# Patient Record
Sex: FEMALE | Race: WHITE | Employment: OTHER | URBAN - METROPOLITAN AREA
[De-identification: names, ages, dates, MRNs, and addresses within clinical notes are randomized per-mention and may not be internally consistent; named-entity substitution may affect disease eponyms.]

---

## 2017-01-02 ENCOUNTER — OFFICE VISIT (OUTPATIENT)
Dept: FAMILY MEDICINE | Facility: CLINIC | Age: 72
End: 2017-01-02
Payer: MEDICARE

## 2017-01-02 VITALS
WEIGHT: 144 LBS | TEMPERATURE: 97.9 F | HEART RATE: 60 BPM | SYSTOLIC BLOOD PRESSURE: 131 MMHG | HEIGHT: 62 IN | DIASTOLIC BLOOD PRESSURE: 69 MMHG | BODY MASS INDEX: 26.5 KG/M2 | OXYGEN SATURATION: 96 %

## 2017-01-02 DIAGNOSIS — Z12.11 SCREEN FOR COLON CANCER: ICD-10-CM

## 2017-01-02 DIAGNOSIS — Z13.29 SCREENING FOR THYROID DISORDER: ICD-10-CM

## 2017-01-02 DIAGNOSIS — E78.5 HYPERLIPIDEMIA, UNSPECIFIED HYPERLIPIDEMIA TYPE: ICD-10-CM

## 2017-01-02 DIAGNOSIS — J45.20 INTERMITTENT ASTHMA, UNCOMPLICATED: ICD-10-CM

## 2017-01-02 DIAGNOSIS — I10 ESSENTIAL HYPERTENSION: ICD-10-CM

## 2017-01-02 DIAGNOSIS — K21.9 GASTROESOPHAGEAL REFLUX DISEASE WITHOUT ESOPHAGITIS: Primary | Chronic | ICD-10-CM

## 2017-01-02 PROCEDURE — 99214 OFFICE O/P EST MOD 30 MIN: CPT | Performed by: INTERNAL MEDICINE

## 2017-01-02 RX ORDER — CALCIUM CARBONATE 500 MG/1
1 TABLET, CHEWABLE ORAL DAILY
COMMUNITY
End: 2019-07-29

## 2017-01-02 ASSESSMENT — ANXIETY QUESTIONNAIRES
5. BEING SO RESTLESS THAT IT IS HARD TO SIT STILL: SEVERAL DAYS
7. FEELING AFRAID AS IF SOMETHING AWFUL MIGHT HAPPEN: SEVERAL DAYS
6. BECOMING EASILY ANNOYED OR IRRITABLE: NOT AT ALL
GAD7 TOTAL SCORE: 4
3. WORRYING TOO MUCH ABOUT DIFFERENT THINGS: NOT AT ALL
1. FEELING NERVOUS, ANXIOUS, OR ON EDGE: SEVERAL DAYS
IF YOU CHECKED OFF ANY PROBLEMS ON THIS QUESTIONNAIRE, HOW DIFFICULT HAVE THESE PROBLEMS MADE IT FOR YOU TO DO YOUR WORK, TAKE CARE OF THINGS AT HOME, OR GET ALONG WITH OTHER PEOPLE: NOT DIFFICULT AT ALL
2. NOT BEING ABLE TO STOP OR CONTROL WORRYING: NOT AT ALL

## 2017-01-02 ASSESSMENT — PATIENT HEALTH QUESTIONNAIRE - PHQ9: 5. POOR APPETITE OR OVEREATING: SEVERAL DAYS

## 2017-01-02 NOTE — Clinical Note
My Asthma Action Plan  Name: Carmen Bonner   YOB: 1945  Date: 1/2/2017   My doctor: Maribell Castaneda   My clinic: [address] [id number] [phone] My Control Medicine: Singulair        Dose:  My Rescue Medicine: Albuterol        Dose:    My Asthma Severity: intermittent  Avoid your asthma triggers: exercise or sports        GREEN ZONE   Good Control    I feel good    No cough or wheeze    Can work, sleep and play without asthma symptoms       Take your asthma control medicine every day.     1. If exercise triggers your asthma, take your rescue medication    15 minutes before exercise or sports, and    During exercise if you have asthma symptoms  2. Spacer to use with inhaler: If you have a spacer, make sure to use it with your inhaler             YELLOW ZONE Getting Worse  I have ANY of these:    I do not feel good    Cough or wheeze    Chest feels tight    Wake up at night   1. Keep taking your Green Zone medications  2. Start taking your rescue medicine:    every 20 minutes for up to 1 hour. Then every 4 hours for 24-48 hours.  3. If you stay in the Yellow Zone for more than 12-24 hours, contact your doctor.  4. If you do not return to the Green Zone in 12-24 hours or you get worse, start taking your oral steroid medicine if prescribed by your provider.           RED ZONE Medical Alert - Get Help  I have ANY of these:    I feel awful    Medicine is not helping    Breathing getting harder    Trouble walking or talking    Nose opens wide to breathe       1. Take your rescue medicine NOW  2. If your provider has prescribed an oral steroid medicine, start taking it NOW  3. Call your doctor NOW  4. If you are still in the Red Zone after 20 minutes and you have not reached your doctor:    Take your rescue medicine again and    Call 911 or go to the emergency room right away    See your regular doctor within 2 weeks of an Emergency Room or  Urgent Care visit for follow-up treatment.        The above medication may be given at school or day care?: Yes and N/A (Adult Patient)  Child can carry and use inhaler(s) at school with approval of school nurse?: Yes and N/A (Adult Patient)    Electronically signed by: Maribell Castaneda, January 2, 2017    Annual Reminders:  Meet with Asthma Educator,  Flu Shot in the Fall, consider Pneumonia Vaccination for patients with asthma (aged 19 and older).    Pharmacy: [facility]/PHARMACY #[id number] - [facility] [address] [facility]                      Asthma Triggers  How To Control Things That Make Your Asthma Worse    Triggers are things that make your asthma worse.  Look at the list below to help you find your triggers and what you can do about them.  You can help prevent asthma flare-ups by staying away from your triggers.      Trigger                                                          What you can do   Cigarette Smoke  Tobacco smoke can make asthma worse. Do not allow smoking in your home, car or around you.  Be sure no one smokes at a child s day care or school.  If you smoke, ask your health care provider for ways to help you quick.  Ask family members to quit too.  Ask your health care provider for a referral to Quit plan to help you quit smoking, or call 2-365-696-PLAN.     Colds, Flu, Bronchitis  These are common triggers of asthma. Wash your hands often.  Don t touch your eyes, nose or mouth.  Get a flu shot every year.     Dust Mites  These are tiny bugs that live in cloth or carpet. They are too small to see. Wash sheets and blankets in hot water every week.   Encase pillows and mattress in dust mite proof covers.  Avoid having carpet if you can. If you have carpet, vacuum weekly.   Use a dust mask and HEPA vacuum.   Pollen and Outdoor Mold  Some people are allergic to trees, grass, or weed pollen, or molds. Try to keep your windows closed.  Limit time out doors when pollen count is high.   Ask you health care  provider about taking medicine during allergy season.     Animal Dander  Some people are allergic to skin flakes, urine or saliva from pets with fur or feathers. Keep pets with fur or feathers out of your home.    If you can t keep the pet outdoors, then keep the pet out of your bedroom.  Keep the bedroom door closed.  Keep pets off cloth furniture and away from stuffed toys.     Mice, Rats, and Cockroaches  Some people are allergic to the waste from these pests.   Cover food and garbage.  Clean up spills and food crumbs.  Store grease in the refrigerator.   Keep food out of the bedroom.   Indoor Mold  This can be a trigger if your home has high moisture Fix leaking faucets, pipes, or other sources of water.   Clean moldy surfaces.  Dehumidify basement if it is damp and smelly.   Smoke, Strong Odors, and Sprays  These can reduce air quality. Stay away from strong odors and sprays, such as perfume, powder, hair spray, paints, smoke incense, paint, cleaning products, candles and new carpet.   Exercise or Sports  Some people with asthma have this trigger. Be active!  Ask you doctor about taking medicine before sports or exercise to prevent symptoms.    Warm up for 5-10 minutes before and after sports or exercise.     Other Triggers of Asthma  Cold air:  Cover your nose and mouth with a scarf.  Sometimes laughing or crying can be a trigger.  Some medicines and food can trigger asthma.

## 2017-01-02 NOTE — PATIENT INSTRUCTIONS
Schedule fasting labs  Do FIT test   Follow up in 6 months ( wellness visit)  Seek sooner medical attention if there is any worsening of symptoms or problems.

## 2017-01-02 NOTE — PROGRESS NOTES
SUBJECTIVE:                                                    Carmen Bonner is a 71 year old female who presents to clinic today for the following health issues:      Chief Complaint   Patient presents with     Gastrointestinal Problem     digestive issues  --Omeprazole caused diarrhea so stopped.  Takes  a tums and avoids caffeine      Carmen is presenting today for multiple concerns    She went to [place] late October and her singulair was lost somewhere along the way  Is not able to get a refill before the end of the month due to insurance  She has been doing fine without this medication and is wondering if it is still necissary  Was informed about the advantages of this medication and advised to continue    She has also discontinued use of all of her acid reflux medications, only takes Tums  For the last couple of months she has only taken one Tums before breakfast every day  Eats a well balanced low fat diet from [employer]  She has had no complaints of acid reflux since managing it in this way  Was told she could continue these methods as long as they're working    Her final concern is about fall risks  Within the last month she saw an elderly resident at her living facility collapse down the stairs and had later   She is worried that this could happen to her and wants to know how to prevent this  Was told she should take fall precautions and continue maintaining her health and keep her blood pressure under control    She has been consistently losing weight, since 12 has lost 40lbs  She eats a healthy diet provided by [employer]  Aiming for two 20 minute walking sessions per day    She is not yet due for a DEXA  She does not take her vitamin D suplement  States she gets GI upset from it  Tries to work calcium into her diet      Problem list and histories reviewed & adjusted, as indicated.  Additional history: as documented    Patient Active Problem List   Diagnosis     BMI 31      CARDIOVASCULAR SCREENING; LDL GOAL LESS THAN 130     HTN, goal below 140/90     Intermittent asthma     GERD (gastroesophageal reflux disease)     History of tobacco abuse     Insomnia     Arthritis     Lumbar spondylosis     Anxiety     Upper back pain     Hypertension     Pain in thoracic spine     Chronic pain     Stool incontinence     Asthma     Advanced directives, counseling/discussion     Allergic rhinitis     Past Surgical History   Procedure Laterality Date     Hysterectomy total abdominal       Cholecystectomy         Social History   Substance Use Topics     Smoking status: Former Smoker -- 1.00 packs/day for 26 years     Smokeless tobacco: Never Used     Alcohol Use: No      Comment: Attends AA Meetings     Family History   Problem Relation Age of Onset     CANCER Mother      OSTEOPOROSIS Mother      CEREBROVASCULAR DISEASE Father      Alzheimer Disease Father      Genitourinary Problems Maternal Grandmother      HEART DISEASE Paternal Grandfather      OSTEOPOROSIS Sister      Arthritis Sister      Breast Cancer Paternal Grandmother      Colon Cancer No family hx of          Current Outpatient Prescriptions   Medication Sig Dispense Refill     calcium carbonate (TUMS) 500 MG chewable tablet Take 1 chew tab by mouth daily       QUEtiapine (SEROQUEL) 100 MG tablet TAKE 1 & 1/2 TABLETS BY MOUTH DAILY 150 tablet 0     tiZANidine (ZANAFLEX) 2 MG tablet TAKE ONE HALF TABLET IN MORNING AND MID DAY AND 1 TAB AT AT BEDTIME .TOTAL OF 2 TABS DAILY 60 tablet 3     lisinopril (PRINIVIL,ZESTRIL) 40 MG tablet TAKE 1 TABLET (40 MG) BY MOUTH DAILY 90 tablet 2     albuterol (ALBUTEROL) 108 (90 BASE) MCG/ACT inhaler Inhale 2 puffs into the lungs every 4 hours as needed for shortness of breath / dyspnea or wheezing 8.5 Inhaler 3     estradiol (ESTRACE) 0.1 MG/GM vaginal cream Place 2 g vaginally twice a week 42.5 g 3     amLODIPine (NORVASC) 5 MG tablet TAKE 1 TABLET (5 MG) BY MOUTH DAILY 90 tablet 3     mometasone  "(NASONEX) 50 MCG/ACT nasal spray Spray 2 sprays into both nostrils daily 1 Box 3     triamcinolone (KENALOG) 0.1 % cream Apply topically 2 times daily 45 g 1     vitamin  B complex with vitamin C (VITAMIN  B COMPLEX) TABS Take 1 tablet by mouth daily       acetaminophen (ACETAMINOPHEN EXTRA STRENGTH) 500 MG tablet Take 500-1,000 mg by mouth every 6 hours as needed for mild pain       Cranberry 140-100-3 MG-MG-UNIT CAPS Take 1 capsule by mouth daily       ValACYclovir HCl (VALTREX PO) Take by mouth as needed Herpes Outbreak       montelukast (SINGULAIR) 10 MG tablet Take 1 tablet (10 mg) by mouth At Bedtime 90 tablet 3     Allergies   Allergen Reactions     Omeprazole Diarrhea     Sulfa Drugs Itching       ROS:  Constitutional, HEENT, cardiovascular, pulmonary, gi and gu systems are negative, except as otherwise noted.      This document serves as a record of the services and decisions personally performed and made by Maribell Castaneda MD. It was created on her behalf by Reed Woodward, a trained medical scribe. The creation of this document is based on the provider's statements to the medical scribe.    Scribobdulia Woodward 1:05 PM, January 2, 2017    OBJECTIVE:                                                    /69 mmHg  Pulse 60  Temp(Src) 97.9  F (36.6  C) (Tympanic)  Ht 1.568 m (5' 1.75\")  Wt 65.318 kg (144 lb)  BMI 26.57 kg/m2  SpO2 96%  Breastfeeding? No  Body mass index is 26.57 kg/(m^2).  GENERAL APPEARANCE: healthy, alert and no distress  PSYCH: mentation appears normal and affect normal/bright         ASSESSMENT/PLAN:                                                      Carmen was seen today for gastrointestinal problem.    Diagnoses and all orders for this visit:    Gastroesophageal reflux disease without esophagitis  Patient takes one Tums before breakfast every day and eats a low fat diet  She says that this has been effective in treating her GERD  Will continue treatment in this way  If it " worsens she can try zantac 150 mg twice daily.    Intermittent asthma, uncomplicated  She misplaced her last prescription of Singulair  Is not able to get another until the end of the month for insurance reasons  Was wondering if she should continue to fill the prescription because she is feeling fine  Was informed about the benefits of the medication  Will continue to use the medication when she can get a refill    Screen for colon cancer  Patient will send in FIT test promptly  -     Fecal colorectal cancer screen FIT; Future  Does not want colonoscopy at this point   Will consider that in future.    Essential hypertension  Blood pressure is well managed of current medication   Patient has no concerns or known side effects  Discussed the importance of keeping BP under good control to reduce the risk of heart attack and stroke.   Advised to continue to monitor bp once a week  at home and bring those readings on next visit.  Notify us if bp readings consistently stay greater than 140/90 mmHg  Discussed the importance of continuing to exercise and eat a healthy diet  -     Basic metabolic panel; Future    Screening for thyroid disorder  -     TSH with free T4 reflex; Future    Hyperlipidemia, unspecified hyperlipidemia type  Patient was not fasting today  She will schedule fasting lab  -     Lipid Profile; Future    Educated her about medication   Follow up in 6 months  Patient was advised to seek sooner medical attention if there is any new or worsening symptoms    The information in this document, created by the medical scribe for me, accurately reflects the services I personally performed and the decisions made by me. I have reviewed and approved this document for accuracy prior to leaving the patient care area.  Maribell Castaneda MD  1:06 PM, 01/02/2017    Maribell Castaneda MD  [facility]

## 2017-01-02 NOTE — NURSING NOTE
"Chief Complaint   Patient presents with     Gastrointestinal Problem     digestive issues  --Omeprazole caused diarrhea so stopped.  Takes  a tums and avoids caffeine        Initial /69 mmHg  Pulse 60  Temp(Src) 97.9  F (36.6  C) (Tympanic)  Ht 5' 1.75\" (1.568 m)  Wt 144 lb (65.318 kg)  BMI 26.57 kg/m2  SpO2 96%  Breastfeeding? No Estimated body mass index is 26.57 kg/(m^2) as calculated from the following:    Height as of this encounter: 5' 1.75\" (1.568 m).    Weight as of this encounter: 144 lb (65.318 kg).  BP completed using cuff size: regular     Amada LOCKE MA       "

## 2017-01-02 NOTE — MR AVS SNAPSHOT
After Visit Summary   1/2/2017    Carmen Bonner    MRN: [id number]           Patient Information     Date Of Birth          1945        Visit Information        Provider Department      1/2/2017 12:30 PM Maribell Castaneda MD [facility]        Today's Diagnoses     Gastroesophageal reflux disease without esophagitis    -  1     Intermittent asthma, uncomplicated         Screen for colon cancer         Essential hypertension         Screening for thyroid disorder         Hyperlipidemia, unspecified hyperlipidemia type           Care Instructions    Schedule fasting labs  Do FIT test   Follow up in 6 months ( wellness visit)  Seek sooner medical attention if there is any worsening of symptoms or problems.            Follow-ups after your visit        Your next 10 appointments already scheduled     Jan 19, 2017  1:30 PM   MA SCREENING DIGITAL BILATERAL with [id number]   [facility]    [address] [place] [address], [address]  [facility] [address] [phone]           Do not use any powder, lotion or deodorant under your arms or on your breast. If you do, we will ask you to remove it before your exam.  Wear comfortable, two-piece clothing.  If you have any allergies, tell your care team.  Bring any previous mammograms from other facilities or have them mailed to the breast center. This mammogram location, [facility] [place], now offers 3D mammography. It doesn't replace a screening mammogram and can be done with a regular screening mammogram. It is optional and not all insurances will pay for it. 3D mammography is a special kind of mammogram that produces a three-dimensional image of the breast by using low dose-xrays. 3D allows the radiologist to see the breast tissue differently from 2D, which reduces the chance of repeat testing due to overlapping breast tissue. If you are interested in have a 3D mammogram, please check with your  "insurance before you arrive for your exam. On the day of your exam you will be asked if you would like 3D imaging.              Future tests that were ordered for you today     Open Future Orders        Priority Expected Expires Ordered    Lipid Profile Routine 1/3/2017 2017 2017    Basic metabolic panel Routine 1/3/2017 2017 2017    TSH with free T4 reflex Routine 1/3/2017 2017 2017    Fecal colorectal cancer screen FIT Routine 2017 3/27/2017 2017            Who to contact     If you have questions or need follow up information about today's clinic visit or your schedule please contact [facility] directly at [phone].  Normal or non-critical lab and imaging results will be communicated to you by MyChart, letter or phone within 4 business days after the clinic has received the results. If you do not hear from us within 7 days, please contact the clinic through [employer] or phone. If you have a critical or abnormal lab result, we will notify you by phone as soon as possible.  Submit refill requests through [employer] or call your pharmacy and they will forward the refill request to us. Please allow 3 business days for your refill to be completed.          Additional Information About Your Visit        [employer] Information     [employer] lets you send messages to your doctor, view your test results, renew your prescriptions, schedule appointments and more. To sign up, go to [email or web address] . Click on \"Log in\" on the left side of the screen, which will take you to the Welcome page. Then click on \"Sign up Now\" on the right side of the page.     You will be asked to enter the access code listed below, as well as some personal information. Please follow the directions to create your username and password.     Your access code is: [email or web address]  Expires: 2017 12:47 PM     Your access code will  in 90 days. If you need help or a new code, please call your [facility] " "clinic or [phone].        Your Vitals Were     Pulse Temperature Height BMI (Body Mass Index) Pulse Oximetry Breastfeeding?    60 97.9  F (36.6  C) (Tympanic) 5' 1.75\" (1.568 m) 26.57 kg/m2 96% No       Blood Pressure from Last 3 Encounters:   01/02/17 131/69   08/02/16 138/70   04/19/16 136/72    Weight from Last 3 Encounters:   01/02/17 144 lb (65.318 kg)   08/02/16 147 lb 9.6 oz (66.951 kg)   04/19/16 150 lb (68.04 kg)              We Performed the Following     Asthma Action Plan   (Please complete E-AAP by signing order and opening link in order details)          Today's Medication Changes          These changes are accurate as of: 1/2/17 12:47 PM.  If you have any questions, ask your nurse or doctor.               Stop taking these medicines if you haven't already. Please contact your care team if you have questions.     omeprazole 40 MG capsule   Commonly known as:  priLOSEC   Stopped by:  Maribell Castaneda MD                    Primary Care Provider Office Phone # Fax #    Maribell Castaneda [phone]       [facility]    [address] [place] [address] [place] [address] [address]        Thank you!     Thank you for choosing [facility]  for your care. Our goal is always to provide you with excellent care. Hearing back from our patients is one way we can continue to improve our services. Please take a few minutes to complete the written survey that you may receive in the mail after your visit with us. Thank you!             Your Updated Medication List - Protect others around you: Learn how to safely use, store and throw away your medicines at www.disposemymeds.org.          This list is accurate as of: 1/2/17 12:47 PM.  Always use your most recent med list.                   Brand Name Dispense Instructions for use    ACETAMINOPHEN EXTRA STRENGTH 500 MG tablet   Generic drug:  acetaminophen      Take 500-1,000 mg by mouth every 6 hours as needed for mild pain       " albuterol 108 (90 BASE) MCG/ACT Inhaler    albuterol    8.5 Inhaler    Inhale 2 puffs into the lungs every 4 hours as needed for shortness of breath / dyspnea or wheezing       amLODIPine 5 MG tablet    NORVASC    90 tablet    TAKE 1 TABLET (5 MG) BY MOUTH DAILY       calcium carbonate 500 MG chewable tablet    TUMS     Take 1 chew tab by mouth daily       Cranberry 140-100-3 MG-MG-UNIT Caps      Take 1 capsule by mouth daily       estradiol 0.1 MG/GM cream    ESTRACE    42.5 g    Place 2 g vaginally twice a week       lisinopril 40 MG tablet    PRINIVIL/ZESTRIL    90 tablet    TAKE 1 TABLET (40 MG) BY MOUTH DAILY       mometasone 50 MCG/ACT spray    NASONEX    1 Box    Spray 2 sprays into both nostrils daily       montelukast 10 MG tablet    SINGULAIR    90 tablet    Take 1 tablet (10 mg) by mouth At Bedtime       QUEtiapine 100 MG tablet    SEROquel    150 tablet    TAKE 1 & 1/2 TABLETS BY MOUTH DAILY       tiZANidine 2 MG tablet    ZANAFLEX    60 tablet    TAKE ONE HALF TABLET IN MORNING AND MID DAY AND 1 TAB AT AT BEDTIME .TOTAL OF 2 TABS DAILY       triamcinolone 0.1 % cream    KENALOG    45 g    Apply topically 2 times daily       VALTREX PO      Take by mouth as needed Herpes Outbreak       vitamin B complex with vitamin C Tabs tablet      Take 1 tablet by mouth daily

## 2017-01-03 ASSESSMENT — ANXIETY QUESTIONNAIRES: GAD7 TOTAL SCORE: 4

## 2017-01-03 ASSESSMENT — ASTHMA QUESTIONNAIRES: ACT_TOTALSCORE: 23

## 2017-01-03 ASSESSMENT — PATIENT HEALTH QUESTIONNAIRE - PHQ9: SUM OF ALL RESPONSES TO PHQ QUESTIONS 1-9: 5

## 2017-01-09 PROBLEM — E78.5 HYPERLIPIDEMIA, UNSPECIFIED HYPERLIPIDEMIA TYPE: Status: ACTIVE | Noted: 2017-01-09

## 2017-01-17 ENCOUNTER — TELEPHONE (OUTPATIENT)
Dept: FAMILY MEDICINE | Facility: CLINIC | Age: 72
End: 2017-01-17

## 2017-01-17 DIAGNOSIS — I10 ESSENTIAL HYPERTENSION: ICD-10-CM

## 2017-01-17 DIAGNOSIS — Z13.29 SCREENING FOR THYROID DISORDER: ICD-10-CM

## 2017-01-17 DIAGNOSIS — E78.5 HYPERLIPIDEMIA, UNSPECIFIED HYPERLIPIDEMIA TYPE: ICD-10-CM

## 2017-01-17 LAB
ANION GAP SERPL CALCULATED.3IONS-SCNC: 7 MMOL/L (ref 3–14)
BUN SERPL-MCNC: 17 MG/DL (ref 7–30)
CALCIUM SERPL-MCNC: 9.1 MG/DL (ref 8.5–10.1)
CHLORIDE SERPL-SCNC: 102 MMOL/L (ref 94–109)
CHOLEST SERPL-MCNC: 195 MG/DL
CO2 SERPL-SCNC: 28 MMOL/L (ref 20–32)
CREAT SERPL-MCNC: 0.72 MG/DL (ref 0.52–1.04)
GFR SERPL CREATININE-BSD FRML MDRD: 79 ML/MIN/1.7M2
GLUCOSE SERPL-MCNC: 100 MG/DL (ref 70–99)
HDLC SERPL-MCNC: 56 MG/DL
LDLC SERPL CALC-MCNC: 116 MG/DL
NONHDLC SERPL-MCNC: 139 MG/DL
POTASSIUM SERPL-SCNC: 3.9 MMOL/L (ref 3.4–5.3)
SODIUM SERPL-SCNC: 137 MMOL/L (ref 133–144)
TRIGL SERPL-MCNC: 114 MG/DL
TSH SERPL DL<=0.005 MIU/L-ACNC: 2.02 MU/L (ref 0.4–4)

## 2017-01-17 PROCEDURE — 84443 ASSAY THYROID STIM HORMONE: CPT | Performed by: INTERNAL MEDICINE

## 2017-01-17 PROCEDURE — 80061 LIPID PANEL: CPT | Performed by: INTERNAL MEDICINE

## 2017-01-17 PROCEDURE — 80048 BASIC METABOLIC PNL TOTAL CA: CPT | Performed by: INTERNAL MEDICINE

## 2017-01-17 PROCEDURE — 36415 COLL VENOUS BLD VENIPUNCTURE: CPT | Performed by: INTERNAL MEDICINE

## 2017-01-17 NOTE — TELEPHONE ENCOUNTER
Spoke with pt and let her know that she can  letter with the  staff.    Darling Mcdaniels ,SUSAN

## 2017-01-17 NOTE — Clinical Note
[facility]  [address]  [address]  [place]  [address]  Tel: [phone]    January 18, 2017    Carmen Bonner  [address]  [facility] [address]        Dear Ms. Bonner,    The testing of your blood sugar, kidney function, liver function and electrolytes was satisfactory .  TSH which is thyroid hormone is normal.  Your total cholesterol is normal.  HDL which is called good cholesterol is normal.  Your LDL cholesterol is elevated   Your triglycerides are normal.  Eat low cholesterol low fat  diet and do regular physical activity. Avoid high sugar containing food.    If you have any further questions or problems, please contact our office.      Sincerely,    Maribell Castaneda MD    Results for orders placed or performed in visit on 01/17/17   Lipid Profile   Result Value Ref Range    Cholesterol 195 <200 mg/dL    Triglycerides 114 <150 mg/dL    HDL Cholesterol 56 >49 mg/dL    LDL Cholesterol Calculated 116 (H) <100 mg/dL    Non HDL Cholesterol 139 (H) <130 mg/dL   Basic metabolic panel   Result Value Ref Range    Sodium 137 133 - 144 mmol/L    Potassium 3.9 3.4 - 5.3 mmol/L    Chloride 102 94 - 109 mmol/L    Carbon Dioxide 28 20 - 32 mmol/L    Anion Gap 7 3 - 14 mmol/L    Glucose 100 (H) 70 - 99 mg/dL    Urea Nitrogen 17 7 - 30 mg/dL    Creatinine 0.72 0.52 - 1.04 mg/dL    GFR Estimate 79 >60 mL/min/1.7m2    GFR Estimate If Black >90   GFR Calc   >60 mL/min/1.7m2    Calcium 9.1 8.5 - 10.1 mg/dL   TSH with free T4 reflex   Result Value Ref Range    TSH 2.02 0.40 - 4.00 mU/L

## 2017-01-17 NOTE — TELEPHONE ENCOUNTER
Reason for Call:  Form, our goal is to have forms completed with 72 hours, however, some forms may require a visit or additional information.    Type of letter, form or note:  goal weight letter    Who is the form from?: Patient    Where did the form come from: Patient or family brought in       What clinic location was the form placed at?: [facility]    Where the form was placed: 's Box    What number is listed as a contact on the form?:        Additional comments: Please call patient when ready for      Call taken on 1/17/2017 at 10:38 AM by Coy Stewart

## 2017-01-17 NOTE — Clinical Note
[place] [address] [facility] [place] [address]  Phone: [phone]    01/17/2017    Carmen Bonner  [address]  [facility] [id number]      To whom it may concern:     This patient is followed by me .  Her goal weight is 145 pounds        Sincerely,      Maribell Castaneda MD

## 2017-01-18 NOTE — PROGRESS NOTES
Quick Note:    Please Notify the patient  The testing of your blood sugar, kidney function, liver function and electrolytes was satisfactory .  TSH which is thyroid hormone is normal.  Your total cholesterol is normal.  HDL which is called good cholesterol is normal.  Your LDL cholesterol is elevated   Your triglycerides are normal.  Eat low cholesterol low fat diet and do regular physical activity. Avoid high sugar containing food.  Please send the copy of lab results also to this patient.          ______

## 2017-01-19 ENCOUNTER — HOSPITAL ENCOUNTER (OUTPATIENT)
Dept: MAMMOGRAPHY | Facility: CLINIC | Age: 72
Discharge: HOME | End: 2017-01-19
Attending: INTERNAL MEDICINE | Admitting: INTERNAL MEDICINE
Payer: MEDICARE

## 2017-01-19 DIAGNOSIS — Z12.31 VISIT FOR SCREENING MAMMOGRAM: ICD-10-CM

## 2017-01-19 PROCEDURE — 77063 BREAST TOMOSYNTHESIS BI: CPT

## 2017-01-19 PROCEDURE — G0202 SCR MAMMO BI INCL CAD: HCPCS

## 2017-01-24 PROCEDURE — 82274 ASSAY TEST FOR BLOOD FECAL: CPT | Performed by: INTERNAL MEDICINE

## 2017-01-25 DIAGNOSIS — Z12.11 SCREEN FOR COLON CANCER: ICD-10-CM

## 2017-01-25 LAB — HEMOCCULT STL QL IA: NEGATIVE

## 2017-01-26 DIAGNOSIS — J45.909 UNCOMPLICATED ASTHMA, UNSPECIFIED ASTHMA SEVERITY: ICD-10-CM

## 2017-01-26 RX ORDER — MONTELUKAST SODIUM 10 MG/1
10 TABLET ORAL AT BEDTIME
Qty: 90 TABLET | Refills: 3 | Status: SHIPPED | OUTPATIENT
Start: 2017-01-26 | End: 2017-11-14

## 2017-01-26 NOTE — TELEPHONE ENCOUNTER
Routing refill request to provider for review/approval because:  A break in medication  Evelyn Guzman RN  [employer]

## 2017-01-26 NOTE — PROGRESS NOTES
Quick Note:    Please Notify the patient  Fecal colorectal cancer screen (FIT) is negative.    ______

## 2017-01-26 NOTE — TELEPHONE ENCOUNTER
montelukast (SINGULAIR) 10 MG tablet 90 tablet 3 8/31/2015            Last Written Prescription Date: 08/31/2015  Last Fill Quantity: 90 tablets, # refills: 3    Last Office Visit with [facility], P or [facility] prescribing provider:  01/02/2017   Future Office Visit:       Date of Last Asthma Action Plan Letter:   Asthma Action Plan Q1 Year    Topic Date Due     Asthma Action Plan - yearly  01/02/2018      Asthma Control Test:   ACT Total Scores 1/2/2017   ACT TOTAL SCORE -   ASTHMA ER VISITS -   ASTHMA HOSPITALIZATIONS -   ACT TOTAL SCORE (Goal Greater than or Equal to 20) 23   In the past 12 months, how many times did you visit the emergency room for your asthma without being admitted to the hospital? 0   In the past 12 months, how many times were you hospitalized overnight because of your asthma? 0       Date of Last Spirometry Test:   No results found for this or any previous visit.

## 2017-02-14 ENCOUNTER — OFFICE VISIT (OUTPATIENT)
Dept: PODIATRY | Facility: CLINIC | Age: 72
End: 2017-02-14
Payer: MEDICARE

## 2017-02-14 ENCOUNTER — RADIANT APPOINTMENT (OUTPATIENT)
Dept: GENERAL RADIOLOGY | Facility: CLINIC | Age: 72
End: 2017-02-14
Attending: PODIATRIST
Payer: MEDICARE

## 2017-02-14 VITALS
RESPIRATION RATE: 16 BRPM | BODY MASS INDEX: 26.91 KG/M2 | OXYGEN SATURATION: 95 % | WEIGHT: 146.2 LBS | SYSTOLIC BLOOD PRESSURE: 149 MMHG | DIASTOLIC BLOOD PRESSURE: 69 MMHG | HEART RATE: 59 BPM | TEMPERATURE: 98.7 F | HEIGHT: 62 IN

## 2017-02-14 DIAGNOSIS — M79.672 LEFT FOOT PAIN: Primary | ICD-10-CM

## 2017-02-14 DIAGNOSIS — B35.1 ONYCHOMYCOSIS: ICD-10-CM

## 2017-02-14 DIAGNOSIS — M20.5X2 HALLUX LIMITUS, LEFT: ICD-10-CM

## 2017-02-14 DIAGNOSIS — M20.42 HAMMER TOE OF LEFT FOOT: ICD-10-CM

## 2017-02-14 PROCEDURE — 73630 X-RAY EXAM OF FOOT: CPT | Mod: LT

## 2017-02-14 PROCEDURE — 99204 OFFICE O/P NEW MOD 45 MIN: CPT | Performed by: PODIATRIST

## 2017-02-14 NOTE — PATIENT INSTRUCTIONS
NAIL FUNGUS / ONYCHOMYCOSIS     Nail fungus is not a hygiene problem and will not likely lead to significant medical   problems. The nails may get thick causing pain and possibly local skin infection.   Treatments include debridement (trimming), oral antifungals, topical antifungals and complete removal of the nail. Most fungal nails are not treated.   Topicals such as tea tree oil can be helpful for surface fungus and may, at best, limit   progression. Over the counter creams (such as Lamisil) can also be used however, their effectiveness is also quite low.  Topical treatment with Pen lac is expensive and often not covered by insurance. Pen lac has an approximate 8% success rate. Topical therapy recommendations is to apply twice a day for at least 3-4 months as it takes 9 months for new nail to grow out.    Experts suggest soaking your feet for 15 to 20 minutes in a mixture of 1 cup vinegar to 4 cups warm water. Be sure to rinse well and pat your feet dry when you're done. You can soak your feet like this daily. But if your skin becomes irritated, try soaking only two to three times a week. Vicks VapoRub. As with vinegar, there have been no controlled clinical trials to assess the effectiveness of Vicks VapoRub on nail fungus, but there have been numerous anecdotal reports that it works. There's no consensus on how often to apply this product, so check with your doctor before using it on your nails.     Oral therapies include Sporanox and Lamisil. Oral therapies are also expensive and not very effective. Side effects such as liver disease are the main concern. Return of fungus is common even if the treatment worked.       HAMMERTOES    What Is Hammertoe?  Hammertoe is a contracture (bending) of one or both joints of the second, third, fourth, or fifth (little) toes. This abnormal bending can put pressure on the toe when wearing shoes, causing problems to develop.  Hammertoes usually start out as mild deformities  and get progressively worse over time. In the earlier stages, hammertoes are flexible and the symptoms can often be managed with noninvasive measures. But if left untreated, hammertoes can become more rigid and will not respond to non-surgical treatment.  Because of the progressive nature of hammertoes, they should receive early attention. Hammertoes never get better without some kind of intervention.  Causes  The most common cause of hammertoe is a muscle/tendon imbalance. This imbalance, which leads to a bending of the toe, results from mechanical (structural) changes in the foot that occur over time in some people.  Hammertoes may be aggravated by shoes that don t fit properly. A hammertoe may result if a toe is too long and is forced into a cramped position when a tight shoe is worn.  Occasionally, hammertoe is the result of an earlier trauma to the toe. In some people, hammertoes are inherited.  Symptoms  Common symptoms of hammertoes include:  Pain or irritation of the affected toe when wearing shoes.   Corns and calluses (a buildup of skin) on the toe, between two toes, or on the ball of the foot. Corns are caused by constant friction against the shoe. They may be soft or hard, depending upon their location.   Inflammation, redness, or a burning sensation   Contracture of the toe   In more severe cases of hammertoe, open sores may form.   Diagnosis  Although hammertoes are readily apparent, to arrive at a diagnosis the foot and ankle surgeon will obtain a thorough history of your symptoms and examine your foot. During the physical examination, the doctor may attempt to reproduce your symptoms by manipulating your foot and will study the contractures of the toes. In addition, the foot and ankle surgeon may take x-rays to determine the degree of the deformities and assess any changes that may have occurred.   Hammertoes are progressive - they don t go away by themselves and usually they will get worse over  time. However, not all cases are alike - some hammertoes progress more rapidly than others. Once your foot and ankle surgeon has evaluated your hammertoes, a treatment plan can be developed that is suited to your needs.  Non-surgical Treatment  There is a variety of treatment options for hammertoe. The treatment your foot and ankle surgeon selects will depend upon the severity of your hammertoe and other factors.  A number of non-surgical measures can be undertaken:  Padding corns and calluses. Your foot and ankle surgeon can provide or prescribe pads designed to shield corns from irritation. If you want to try over-the-counter pads, avoid the medicated types. Medicated pads are generally not recommended because they may contain a small amount of acid that can be harmful. Consult your surgeon about this option.   Changes in shoewear. Avoid shoes with pointed toes, shoes that are too short, or shoes with high heels - conditions that can force your toe against the front of the shoe. Instead, choose comfortable shoes with a deep, roomy toe box and heels no higher than two inches.   Orthotic devices. A custom orthotic device placed in your shoe may help control the muscle/tendon imbalance.   Injection therapy. Corticosteroid injections are sometimes used to ease pain and inflammation caused by hammertoe.   Medications. Oral nonsteroidal anti-inflammatory drugs (NSAIDs), such as ibuprofen, may be recommended to reduce pain and inflammation.   Splinting/strapping. Splints or small straps may be applied by the surgeon to realign the bent toe.       HAMMERTOE SURGERY   Hammertoe surgery is complex. The surgical procedure is an attempt to help the toe lay in a better position. Nearly every structure in the toe will be cut including the tendons, ligaments, skin and bone. Hammertoes are a complex deformity and final toe position is difficult to predict. The only sure way to position a toe is to fuse all three digital joints.  "That will not happen as some degree of toe motion is needed for walking. The toe may not be completely reduced as the surrounding skin and other structures may not allow the toe to return to a normal position. The tendons on adjacent toes may need to be cut at the time of the original or subsequent surgeries, as interconnections exist between the toes. The toe may drift after surgery. Stiffness may develop leading to new areas of pressure.   Future shoe choices will be critical in allowing the surgery to provide comfort. The toes will still hurt if shoes rub. The original pain may also persist as other foot problems may be contributing to the current pain. The toe may or may not be pinned in place. External pins would require complete avoidance of water on the foot for six weeks. The pin would be removed about six weeks after the surgery. Strict attention to protection is critical. The pin could get bumped or loosen resulting in early removal. Removal might be necessary before the bone heals which would negatively affect the final surgical outcome and toe alignment.         DEGENERATIVE ARTHRITIS OF THE BIG TOE JOINT (hallux limitus/hallux rigidus)     Arthritis of the joint at the base of the big toe (metatarsophalangeal joint) has several causes. Usually it results from repetitive trauma to the joint, secondary to abnormal foot mechanics. Often it is hereditary. However, a one-time traumatic event can lead to arthritis. The condition can worsen with time. The cartilage wears out, joint surfaces are no longer smooth, bone rubs on bone, inflammation occurs with pain, and eventually bone spurs and loose fragments might develop.   The joint is often painful with activity, worse with flimsy shoes or walking barefoot, and it slowly progresses over time. A person might notice the toe \"locking up\" with walking. There often is an obvious, and irritating, bony bump on top of the foot. Shoes might be uncomfortable. In some " people the pain is so bothersome that recreational activities sometimes even normal daily activities are difficult to perform.   The pain from this arthritis is likely a combination of joint jamming, cartilage loss and inflammation, and irritation from shoes rubbing on the bump. Sometimes other parts of the foot, leg, or back hurt from altering one's walk to compensate for the painful joint.     Ways to help a person live with the discomfort include wearing a good, supportive shoe with a rigid, rocker-type bottom. An example is a hiking boot. A rigid sole minimizes bending of the joint, and therefore, joint motion and pain. Shoes with a high toe box allow for less rubbing on the bump. Avoiding barefoot walking, sandals, flip-flops and slippers usually helps.     Sometimes an insert or orthotic provides symptom relief. This might make shoe fit more difficult. Pads over the bump and occasionally injections into the joint provide relief.     Surgery for this condition is aimed towards alleviating pain. It does not cure the arthritis nor does it guarantee better joint motion. Depending on the condition of the metatarsophalangeal joint, there are several surgical options:    1.  Cutting off the bony bump(s) and cleaning the joint    2.  Loosening the joint up by making cuts in the first metatarsal bone or the big toe bone and removing a small section of bone.    3.  Repositioning bone to minimize jamming of the joint.    4.  In severe cases, the joint is fused. By fusing the joint, it will never bend again. This resolves the pain, because it's the movement of a worn out joint that causes pain. Oftentimes the operation involves a combination of these procedures and requires the use of screws, pins, and/or a small surgical plate.     Healing after surgery requires about six weeks of protection. This allows the bone to heal. Maximum recovery takes about one year. The scar tissue and joint structures require this amount of  time to finish the healing process. Expect stiffness, swelling and numbness during that time frame.   Surgery for arthritis of the metatarsophalangeal joint does involve side effects. Some side effects are predictable and others are less common but do occur. A scar will be visible and could be irritated by shoes. The shoe may rub on the screw or internal pin requiring surgical removal of these fixation devices. The screw and pin would likely be left in place for a full year. The first toe may remain stiff after surgery. The amount of stiffness is variable. Most people never regain normal motion of the first toe. This is due to scar tissue inherent to any surgery, in addition to the cumulative effects of arthritis. Sometimes the big toe drifts to one side or the other. Joint fusion is one option to correct an unstable, drifting toe.   All surgical procedures involve risk of infection, numbness, pain, delayed bone healing, osteotomy (bone cut) dislocation, blood clots, continued foot pain, etc. Arthritic joint surgery is quite complex and should not be taken lightly.    Any skin incision can lead to infection. Deep infection might involve the bone and thus repeat surgery and six weeks of IV antibiotics. Scar tissue can cause nerve pain or numbness. This is generally temporary but can be permanent. We do not have treatments that cure nerve problems. Second toe pain could be related to altered mechanics and pressure transferred to the second toe. Delayed bone healing would lengthen the healing time. Some bones simply do not heal. This requires repeat surgery, electronic bone stimulation and/or extended protection. Smokers have an approximate 20% chance of poor bone healing. This is double that of a non-smoker. The bone cut may displace. This may need to be repaired with a second operation. Displacement can cause joint malalignment. Immobility after surgery can cause a blood clot in the legs and lungs. This could result  in death.   Foot pain is complex. Most feet hurt for more than one reason. Operating on the arthritic   big toe joint will not necessarily create a pain free foot. Appropriate shoes, healthy body weight, avoidance of bare foot walking and moderation of activity will always be necessary to enjoy foot comfort. Arthritis is incurable even with surgery.     Surgery for this type of arthritis is nevertheless quite successful. Most surgical patients are pleased with their foot following surgery. Many of the issues described above can be controlled by taking proper care of your foot during the healing process.   Cosmetic bump surgery is discouraged for the reasons listed above. A bump and joint that is comfortable when wearing appropriate shoes should simply be treated with appropriate shoes.   Your surgeon would be happy to fully describe any of the above issues. You should pursue a full understanding of the operation, recovery process and any potential problems that could develop.

## 2017-02-14 NOTE — NURSING NOTE
"Chief Complaint   Patient presents with     Foot Pain     L foot pain, fungus on nail       Initial /69 (BP Location: Left arm, Patient Position: Chair, Cuff Size: Adult Regular)  Pulse 59  Temp 98.7  F (37.1  C) (Tympanic)  Resp 16  Ht 5' 1.75\" (1.568 m)  Wt 146 lb 3.2 oz (66.3 kg)  SpO2 95%  Breastfeeding? No  BMI 26.96 kg/m2 Estimated body mass index is 26.96 kg/(m^2) as calculated from the following:    Height as of this encounter: 5' 1.75\" (1.568 m).    Weight as of this encounter: 146 lb 3.2 oz (66.3 kg).  Medication Reconciliation: em Trinidad CMA February 14, 2017 1:45 PM      "

## 2017-02-14 NOTE — PROGRESS NOTES
PATIENT HISTORY:  Carmen Bonner is a 71 year old female who presents to clinic for multiple foot issues, left side.  3 months of pain at the 1st MPJ mostly when turning on the foot w/o shoes on.  3 years of noted hammertoes, but no pain.  Also with longstanding left hallux nail discoloration, no pain.  Reports prior nail injury, but none recent.  She has tried wide shoes, superfeet, which can help.      Review of Systems:  Patient denies fever, chills, rash, wound, stiffness, limping, numbness, heart burn, blood in stool, chest pain with activity, calf pain when walking, shortness of breath with activity, chronic cough, easy bleeding/bruising, swelling of ankles, excessive thirst, fatigue.  Patient admits to weakness, anxiety, depression.     PAST MEDICAL HISTORY:   Past Medical History   Diagnosis Date     Allergic rhinitis      Anxiety      on seroquel     Asthma      Cervicalgia      Chronic pain      Diabetes (H)      now diet controlled     DJD (degenerative joint disease) of lumbar spine      CHRONIC HAS SEEN PAIN CLINIC- put on tizanidine      Endometriosis      Finger pain      Finger swelling      Flank pain      GERD (gastroesophageal reflux disease)      Hematuria      Herpes      Hypertension      Hyponatremia      Insomnia      on seroquel     Insomnia      Knee pain      Purpura (H)      SAW DERM      Recurrent UTI      Skin cancer      basal cell      Stress incontinence      Upper back pain         PAST SURGICAL HISTORY:   Past Surgical History   Procedure Laterality Date     Hysterectomy total abdominal       Cholecystectomy          MEDICATIONS:   Current Outpatient Prescriptions:      montelukast (SINGULAIR) 10 MG tablet, Take 1 tablet (10 mg) by mouth At Bedtime, Disp: 90 tablet, Rfl: 3     calcium carbonate (TUMS) 500 MG chewable tablet, Take 1 chew tab by mouth daily, Disp: , Rfl:      QUEtiapine (SEROQUEL) 100 MG tablet, TAKE 1 & 1/2 TABLETS BY MOUTH DAILY, Disp: 150 tablet, Rfl: 0      tiZANidine (ZANAFLEX) 2 MG tablet, TAKE ONE HALF TABLET IN MORNING AND MID DAY AND 1 TAB AT AT BEDTIME .TOTAL OF 2 TABS DAILY, Disp: 60 tablet, Rfl: 3     lisinopril (PRINIVIL,ZESTRIL) 40 MG tablet, TAKE 1 TABLET (40 MG) BY MOUTH DAILY, Disp: 90 tablet, Rfl: 2     albuterol (ALBUTEROL) 108 (90 BASE) MCG/ACT inhaler, Inhale 2 puffs into the lungs every 4 hours as needed for shortness of breath / dyspnea or wheezing, Disp: 8.5 Inhaler, Rfl: 3     estradiol (ESTRACE) 0.1 MG/GM vaginal cream, Place 2 g vaginally twice a week, Disp: 42.5 g, Rfl: 3     amLODIPine (NORVASC) 5 MG tablet, TAKE 1 TABLET (5 MG) BY MOUTH DAILY, Disp: 90 tablet, Rfl: 3     mometasone (NASONEX) 50 MCG/ACT nasal spray, Spray 2 sprays into both nostrils daily, Disp: 1 Box, Rfl: 3     triamcinolone (KENALOG) 0.1 % cream, Apply topically 2 times daily, Disp: 45 g, Rfl: 1     vitamin  B complex with vitamin C (VITAMIN  B COMPLEX) TABS, Take 1 tablet by mouth daily, Disp: , Rfl:      acetaminophen (ACETAMINOPHEN EXTRA STRENGTH) 500 MG tablet, Take 500-1,000 mg by mouth every 6 hours as needed for mild pain, Disp: , Rfl:      Cranberry 140-100-3 MG-MG-UNIT CAPS, Take 1 capsule by mouth daily, Disp: , Rfl:      ValACYclovir HCl (VALTREX PO), Take by mouth as needed Herpes Outbreak, Disp: , Rfl:      ALLERGIES:    Allergies   Allergen Reactions     Omeprazole Diarrhea     Sulfa Drugs Itching        SOCIAL HISTORY:   Social History     Social History     Marital status: Single     Spouse name: N/A     Number of children: N/A     Years of education: N/A     Occupational History     Not on file.     Social History Main Topics     Smoking status: Former Smoker     Packs/day: 1.00     Years: 26.00     Smokeless tobacco: Never Used     Alcohol use No      Comment: Attends AA Meetings     Drug use: No     Sexual activity: No     Other Topics Concern     Parent/Sibling W/ Cabg, Mi Or Angioplasty Before 65f 55m? No     Social History Narrative    Health  maintenance             ADVANCED DIRECTIVE [employer]AMPHLET     COLONOSCOPY     DEXA     ANNUAL WELLNESS VISIT    ASTHMA CONTROL TEST/ ACTION PLAN             LOOSE STOOLS       Duration: frequent looser stools / gurgling noises        No improvement with the fodmaps diet        Better off nexium/         Now on prevacid          Associated flank pain: None      Intensity:  moderate      Accompanying signs and symptoms:         Fever/Chills: no         Gas/Bloating: YES         Nausea/vomitting: no         Diarrhea: YES         Dysuria or Hematuria: no      Now regulating it with diet      Precipitating or alleviating factors:         Pain worse with eating/BM/urination: yes            Therapies tried and outcome: fodmap diet        LMP:  not applicable                         Back Pain            Duration:      HAS CHRONIC LOW BACK PAIN HAS BEEN TO PAIN CLINIC  WAS PUT ON TIZANIDINE  DOES REGULAR BACK EXERCISES  Takes up to 2  A day of tizanidine with tylenol MRI - showed mild to moderate degenerative changes        NEW PAIN UPPER BACK  Had it a few time over the last 2-3 years        3 months, worsening with yoga         Specific cause: none      Description (location/character/radiation): mid back        Intensity:  moderate, severe SHARP PAIN  SAME ON BOTH SIDE        Accompanying signs and symptoms (weakness/fever/urinary symptoms): none      History (similar episodes/surgery/injury):        Precipitating or alleviating factors: YOGA        Therapies tried and outcome: exercise, tylenol          FAMILY HISTORY:   Family History   Problem Relation Age of Onset     CANCER Mother      OSTEOPOROSIS Mother      CEREBROVASCULAR DISEASE Father      Alzheimer Disease Father      Genitourinary Problems Maternal Grandmother      HEART DISEASE Paternal Grandfather      OSTEOPOROSIS Sister      Arthritis Sister      Breast Cancer Paternal Grandmother      Colon Cancer No family hx of         EXAM:Vitals: /69 (BP Location:  "Left arm, Patient Position: Chair, Cuff Size: Adult Regular)  Pulse 59  Temp 98.7  F (37.1  C) (Tympanic)  Resp 16  Ht 5' 1.75\" (1.568 m)  Wt 146 lb 3.2 oz (66.3 kg)  SpO2 95%  Breastfeeding? No  BMI 26.96 kg/m2  BMI= Body mass index is 26.96 kg/(m^2).    General appearance: Patient is alert and fully cooperative with history & exam.  No sign of distress is noted during the visit.     Psychiatric: Affect is pleasant & appropriate.  Patient appears motivated to improve health.     Respiratory: Breathing is regular & unlabored while sitting.     HEENT: Hearing is intact to spoken word.  Speech is clear.  No gross evidence of visual impairment that would impact ambulation.     Dermatologic: Left hallux nail with some yellow discoloration, mild thickening.  Skin is intact to both lower extremities without significant lesions, rash or abrasion.  No paronychia or evidence of soft tissue infection is noted.     Vascular: DP & PT pulses are intact & regular bilaterally.  No significant edema or varicosities noted.  CFT and skin temperature are normal to both lower extremities.     Neurologic: Lower extremity sensation is intact to light touch.  No evidence of weakness or contracture in the lower extremities.  No evidence of neuropathy.     Musculoskeletal: Left 1st MPJ ROM very mildly limited and pain free.  I could not reproduce pain at this joint with palpation.  Lesser hammertoes b/l, most notably 4th and 5th toes.  No pain.  Patient is ambulatory without assistive device or brace.  No gross ankle deformity noted.  No foot or ankle joint effusion is noted.    XRs of left foot reviewed with pt.  Very mild degenerative changes to 1st MPJ.     ASSESSMENT:   Left foot pain, early hallux limitus/OA  Onychomycosis  Hammertoes      PLAN:  Reviewed patient's chart in epic.  Discussed condition and treatment options including pros and cons.    Pain likely due to early OA/hallux limitus of 1st MPJ.      Surgical and non-surgical " treatment options for hallux limitus were discussed.  Non-operative treatments like stiff soles, orthotics and NSAIDs were discussed.  Shoes that provide extra room for the enlarged joint should be helpful.  I would anticipate somewhat short term benefit from joint injection.  Surgery discussed as a last resort.    Discussed causes of nail fungus.  Discussed treatment options with patient and explained that there isn't one treatment that is 100% effective.  Debridement is an option.  Discussed oral lamisil which is the most effective but can have liver effects.  Discussed topicals, which are less effective.  Discussed over the counter antifungal creams.  Also talked about prescription topicals.  Also discussed that if there was damage to the nail and the nail is now dystrophic that none of the above is going to change the nail.  Discussed that if it becomes painful, we can remove the nail in clinic.      Hammertoe treatment options were discussed.  This included both surgical and non-surgical treatment alternatives.  Non-surgical options include wide fitting shoes, deep toe box, crest pad or foam pads, foot orthotics.  Surgical treatments were explained.  I would avoid surgery w/o toe pain.    Handouts given.    F/u prn.    Alirio Aguilar DPM, FACFAS

## 2017-02-14 NOTE — MR AVS SNAPSHOT
After Visit Summary   2/14/2017    Carmen Bonner    MRN: [id number]           Patient Information     Date Of Birth          1945        Visit Information        Provider Department      2/14/2017 1:40 PM Alirio Aguilar DPM [facility]        Today's Diagnoses     Left foot pain    -  1    Hammer toe of left foot        Onychomycosis        Hallux limitus, left          Care Instructions    NAIL FUNGUS / ONYCHOMYCOSIS     Nail fungus is not a hygiene problem and will not likely lead to significant medical   problems. The nails may get thick causing pain and possibly local skin infection.   Treatments include debridement (trimming), oral antifungals, topical antifungals and complete removal of the nail. Most fungal nails are not treated.   Topicals such as tea tree oil can be helpful for surface fungus and may, at best, limit   progression. Over the counter creams (such as Lamisil) can also be used however, their effectiveness is also quite low.  Topical treatment with Pen lac is expensive and often not covered by insurance. Pen lac has an approximate 8% success rate. Topical therapy recommendations is to apply twice a day for at least 3-4 months as it takes 9 months for new nail to grow out.    Experts suggest soaking your feet for 15 to 20 minutes in a mixture of 1 cup vinegar to 4 cups warm water. Be sure to rinse well and pat your feet dry when you're done. You can soak your feet like this daily. But if your skin becomes irritated, try soaking only two to three times a week. Vicks VapoRub. As with vinegar, there have been no controlled clinical trials to assess the effectiveness of Vicks VapoRub on nail fungus, but there have been numerous anecdotal reports that it works. There's no consensus on how often to apply this product, so check with your doctor before using it on your nails.     Oral therapies include Sporanox and Lamisil. Oral therapies are also expensive and not  very effective. Side effects such as liver disease are the main concern. Return of fungus is common even if the treatment worked.       HAMMERTOES    What Is Hammertoe?  Hammertoe is a contracture (bending) of one or both joints of the second, third, fourth, or fifth (little) toes. This abnormal bending can put pressure on the toe when wearing shoes, causing problems to develop.  Hammertoes usually start out as mild deformities and get progressively worse over time. In the earlier stages, hammertoes are flexible and the symptoms can often be managed with noninvasive measures. But if left untreated, hammertoes can become more rigid and will not respond to non-surgical treatment.  Because of the progressive nature of hammertoes, they should receive early attention. Hammertoes never get better without some kind of intervention.  Causes  The most common cause of hammertoe is a muscle/tendon imbalance. This imbalance, which leads to a bending of the toe, results from mechanical (structural) changes in the foot that occur over time in some people.  Hammertoes may be aggravated by shoes that don t fit properly. A hammertoe may result if a toe is too long and is forced into a cramped position when a tight shoe is worn.  Occasionally, hammertoe is the result of an earlier trauma to the toe. In some people, hammertoes are inherited.  Symptoms  Common symptoms of hammertoes include:  Pain or irritation of the affected toe when wearing shoes.   Corns and calluses (a buildup of skin) on the toe, between two toes, or on the ball of the foot. Corns are caused by constant friction against the shoe. They may be soft or hard, depending upon their location.   Inflammation, redness, or a burning sensation   Contracture of the toe   In more severe cases of hammertoe, open sores may form.   Diagnosis  Although hammertoes are readily apparent, to arrive at a diagnosis the foot and ankle surgeon will obtain a thorough history of your  symptoms and examine your foot. During the physical examination, the doctor may attempt to reproduce your symptoms by manipulating your foot and will study the contractures of the toes. In addition, the foot and ankle surgeon may take x-rays to determine the degree of the deformities and assess any changes that may have occurred.   Hammertoes are progressive - they don t go away by themselves and usually they will get worse over time. However, not all cases are alike - some hammertoes progress more rapidly than others. Once your foot and ankle surgeon has evaluated your hammertoes, a treatment plan can be developed that is suited to your needs.  Non-surgical Treatment  There is a variety of treatment options for hammertoe. The treatment your foot and ankle surgeon selects will depend upon the severity of your hammertoe and other factors.  A number of non-surgical measures can be undertaken:  Padding corns and calluses. Your foot and ankle surgeon can provide or prescribe pads designed to shield corns from irritation. If you want to try over-the-counter pads, avoid the medicated types. Medicated pads are generally not recommended because they may contain a small amount of acid that can be harmful. Consult your surgeon about this option.   Changes in shoewear. Avoid shoes with pointed toes, shoes that are too short, or shoes with high heels - conditions that can force your toe against the front of the shoe. Instead, choose comfortable shoes with a deep, roomy toe box and heels no higher than two inches.   Orthotic devices. A custom orthotic device placed in your shoe may help control the muscle/tendon imbalance.   Injection therapy. Corticosteroid injections are sometimes used to ease pain and inflammation caused by hammertoe.   Medications. Oral nonsteroidal anti-inflammatory drugs (NSAIDs), such as ibuprofen, may be recommended to reduce pain and inflammation.   Splinting/strapping. Splints or small straps may be  applied by the surgeon to realign the bent toe.       HAMMERTOE SURGERY   Hammertoe surgery is complex. The surgical procedure is an attempt to help the toe lay in a better position. Nearly every structure in the toe will be cut including the tendons, ligaments, skin and bone. Hammertoes are a complex deformity and final toe position is difficult to predict. The only sure way to position a toe is to fuse all three digital joints. That will not happen as some degree of toe motion is needed for walking. The toe may not be completely reduced as the surrounding skin and other structures may not allow the toe to return to a normal position. The tendons on adjacent toes may need to be cut at the time of the original or subsequent surgeries, as interconnections exist between the toes. The toe may drift after surgery. Stiffness may develop leading to new areas of pressure.   Future shoe choices will be critical in allowing the surgery to provide comfort. The toes will still hurt if shoes rub. The original pain may also persist as other foot problems may be contributing to the current pain. The toe may or may not be pinned in place. External pins would require complete avoidance of water on the foot for six weeks. The pin would be removed about six weeks after the surgery. Strict attention to protection is critical. The pin could get bumped or loosen resulting in early removal. Removal might be necessary before the bone heals which would negatively affect the final surgical outcome and toe alignment.         DEGENERATIVE ARTHRITIS OF THE BIG TOE JOINT (hallux limitus/hallux rigidus)     Arthritis of the joint at the base of the big toe (metatarsophalangeal joint) has several causes. Usually it results from repetitive trauma to the joint, secondary to abnormal foot mechanics. Often it is hereditary. However, a one-time traumatic event can lead to arthritis. The condition can worsen with time. The cartilage wears out, joint  "surfaces are no longer smooth, bone rubs on bone, inflammation occurs with pain, and eventually bone spurs and loose fragments might develop.   The joint is often painful with activity, worse with flimsy shoes or walking barefoot, and it slowly progresses over time. A person might notice the toe \"locking up\" with walking. There often is an obvious, and irritating, bony bump on top of the foot. Shoes might be uncomfortable. In some people the pain is so bothersome that recreational activities sometimes even normal daily activities are difficult to perform.   The pain from this arthritis is likely a combination of joint jamming, cartilage loss and inflammation, and irritation from shoes rubbing on the bump. Sometimes other parts of the foot, leg, or back hurt from altering one's walk to compensate for the painful joint.     Ways to help a person live with the discomfort include wearing a good, supportive shoe with a rigid, rocker-type bottom. An example is a hiking boot. A rigid sole minimizes bending of the joint, and therefore, joint motion and pain. Shoes with a high toe box allow for less rubbing on the bump. Avoiding barefoot walking, sandals, flip-flops and slippers usually helps.     Sometimes an insert or orthotic provides symptom relief. This might make shoe fit more difficult. Pads over the bump and occasionally injections into the joint provide relief.     Surgery for this condition is aimed towards alleviating pain. It does not cure the arthritis nor does it guarantee better joint motion. Depending on the condition of the metatarsophalangeal joint, there are several surgical options:    1.  Cutting off the bony bump(s) and cleaning the joint    2.  Loosening the joint up by making cuts in the first metatarsal bone or the big toe bone and removing a small section of bone.    3.  Repositioning bone to minimize jamming of the joint.    4.  In severe cases, the joint is fused. By fusing the joint, it will " never bend again. This resolves the pain, because it's the movement of a worn out joint that causes pain. Oftentimes the operation involves a combination of these procedures and requires the use of screws, pins, and/or a small surgical plate.     Healing after surgery requires about six weeks of protection. This allows the bone to heal. Maximum recovery takes about one year. The scar tissue and joint structures require this amount of time to finish the healing process. Expect stiffness, swelling and numbness during that time frame.   Surgery for arthritis of the metatarsophalangeal joint does involve side effects. Some side effects are predictable and others are less common but do occur. A scar will be visible and could be irritated by shoes. The shoe may rub on the screw or internal pin requiring surgical removal of these fixation devices. The screw and pin would likely be left in place for a full year. The first toe may remain stiff after surgery. The amount of stiffness is variable. Most people never regain normal motion of the first toe. This is due to scar tissue inherent to any surgery, in addition to the cumulative effects of arthritis. Sometimes the big toe drifts to one side or the other. Joint fusion is one option to correct an unstable, drifting toe.   All surgical procedures involve risk of infection, numbness, pain, delayed bone healing, osteotomy (bone cut) dislocation, blood clots, continued foot pain, etc. Arthritic joint surgery is quite complex and should not be taken lightly.    Any skin incision can lead to infection. Deep infection might involve the bone and thus repeat surgery and six weeks of IV antibiotics. Scar tissue can cause nerve pain or numbness. This is generally temporary but can be permanent. We do not have treatments that cure nerve problems. Second toe pain could be related to altered mechanics and pressure transferred to the second toe. Delayed bone healing would lengthen the  healing time. Some bones simply do not heal. This requires repeat surgery, electronic bone stimulation and/or extended protection. Smokers have an approximate 20% chance of poor bone healing. This is double that of a non-smoker. The bone cut may displace. This may need to be repaired with a second operation. Displacement can cause joint malalignment. Immobility after surgery can cause a blood clot in the legs and lungs. This could result in death.   Foot pain is complex. Most feet hurt for more than one reason. Operating on the arthritic   big toe joint will not necessarily create a pain free foot. Appropriate shoes, healthy body weight, avoidance of bare foot walking and moderation of activity will always be necessary to enjoy foot comfort. Arthritis is incurable even with surgery.     Surgery for this type of arthritis is nevertheless quite successful. Most surgical patients are pleased with their foot following surgery. Many of the issues described above can be controlled by taking proper care of your foot during the healing process.   Cosmetic bump surgery is discouraged for the reasons listed above. A bump and joint that is comfortable when wearing appropriate shoes should simply be treated with appropriate shoes.   Your surgeon would be happy to fully describe any of the above issues. You should pursue a full understanding of the operation, recovery process and any potential problems that could develop.                 Follow-ups after your visit        Who to contact     If you have questions or need follow up information about today's clinic visit or your schedule please contact [facility] directly at [phone].  Normal or non-critical lab and imaging results will be communicated to you by MyChart, letter or phone within 4 business days after the clinic has received the results. If you do not hear from us within 7 days, please contact the clinic through MyChart or phone. If you have a critical  "or abnormal lab result, we will notify you by phone as soon as possible.  Submit refill requests through [employer] or call your pharmacy and they will forward the refill request to us. Please allow 3 business days for your refill to be completed.          Additional Information About Your Visit        [facility] Information     [employer] lets you send messages to your doctor, view your test results, renew your prescriptions, schedule appointments and more. To sign up, go to [email or web address] . Click on \"Log in\" on the left side of the screen, which will take you to the Welcome page. Then click on \"Sign up Now\" on the right side of the page.     You will be asked to enter the access code listed below, as well as some personal information. Please follow the directions to create your username and password.     Your access code is: [email or web address]  Expires: 2017 12:47 PM     Your access code will  in 90 days. If you need help or a new code, please call your [place] clinic or [phone].        Care EveryWhere ID     This is your Care EveryWhere ID. This could be used by other organizations to access your [place] medical records  [id number]        Your Vitals Were     Pulse Temperature Respirations Height Pulse Oximetry Breastfeeding?    59 98.7  F (37.1  C) (Tympanic) 16 5' 1.75\" (1.568 m) 95% No    BMI (Body Mass Index)                   26.96 kg/m2            Blood Pressure from Last 3 Encounters:   17 149/69   17 131/69   16 138/70    Weight from Last 3 Encounters:   17 146 lb 3.2 oz (66.3 kg)   17 144 lb (65.3 kg)   16 147 lb 9.6 oz (67 kg)              We Performed the Following     XR Foot Left G/E 3 Views        Primary Care Provider Office Phone # Fax #    Maribell Castaneda [phone]       [facility]    [address]  [place]                [place] [address]        Thank you!     Thank you for choosing [facility]  for your " care. Our goal is always to provide you with excellent care. Hearing back from our patients is one way we can continue to improve our services. Please take a few minutes to complete the written survey that you may receive in the mail after your visit with us. Thank you!             Your Updated Medication List - Protect others around you: Learn how to safely use, store and throw away your medicines at www.disposemymeds.org.          This list is accurate as of: 2/14/17  2:10 PM.  Always use your most recent med list.                   Brand Name Dispense Instructions for use    ACETAMINOPHEN EXTRA STRENGTH 500 MG tablet   Generic drug:  acetaminophen      Take 500-1,000 mg by mouth every 6 hours as needed for mild pain       albuterol 108 (90 BASE) MCG/ACT Inhaler    albuterol    8.5 Inhaler    Inhale 2 puffs into the lungs every 4 hours as needed for shortness of breath / dyspnea or wheezing       amLODIPine 5 MG tablet    NORVASC    90 tablet    TAKE 1 TABLET (5 MG) BY MOUTH DAILY       calcium carbonate 500 MG chewable tablet    TUMS     Take 1 chew tab by mouth daily       Cranberry 140-100-3 MG-MG-UNIT Caps      Take 1 capsule by mouth daily       estradiol 0.1 MG/GM cream    ESTRACE    42.5 g    Place 2 g vaginally twice a week       lisinopril 40 MG tablet    PRINIVIL/ZESTRIL    90 tablet    TAKE 1 TABLET (40 MG) BY MOUTH DAILY       mometasone 50 MCG/ACT spray    NASONEX    1 Box    Spray 2 sprays into both nostrils daily       montelukast 10 MG tablet    SINGULAIR    90 tablet    Take 1 tablet (10 mg) by mouth At Bedtime       QUEtiapine 100 MG tablet    SEROquel    150 tablet    TAKE 1 & 1/2 TABLETS BY MOUTH DAILY       tiZANidine 2 MG tablet    ZANAFLEX    60 tablet    TAKE ONE HALF TABLET IN MORNING AND MID DAY AND 1 TAB AT AT BEDTIME .TOTAL OF 2 TABS DAILY       triamcinolone 0.1 % cream    KENALOG    45 g    Apply topically 2 times daily       VALTREX PO      Take by mouth as needed Herpes Outbreak        vitamin B complex with vitamin C Tabs tablet      Take 1 tablet by mouth daily

## 2017-03-25 DIAGNOSIS — F51.01 PRIMARY INSOMNIA: ICD-10-CM

## 2017-03-27 RX ORDER — QUETIAPINE FUMARATE 100 MG/1
TABLET, FILM COATED ORAL
Qty: 150 TABLET | Refills: 0 | Status: SHIPPED | OUTPATIENT
Start: 2017-03-27 | End: 2017-08-21

## 2017-03-27 NOTE — TELEPHONE ENCOUNTER
Prescription approved per [facility] Refill Protocol.  Can fill up to 6 months since LOV.    Margarette Franco RN      QUEtiapine (SEROQUEL) 100 MG tablet     Last Written Prescription Date: 12/26/16  Last Fill Quantity: 150, # refills: 0  Last Office Visit with [facility], [facility] or [facility] prescribing provider: 1/2/17       BP Readings from Last 3 Encounters:   02/14/17 149/69   01/02/17 131/69   08/02/16 138/70     Pulse Readings from Last 2 Encounters:   02/14/17 59   01/02/17 60     Lab Results   Component Value Date     01/17/2017     Lab Results   Component Value Date    WBC 5.8 04/19/2016     Lab Results   Component Value Date    RBC 4.27 04/19/2016     Lab Results   Component Value Date    HGB 12.0 04/19/2016     Lab Results   Component Value Date    HCT 37.3 04/19/2016     No components found for: MCT  Lab Results   Component Value Date    MCV 87 04/19/2016     Lab Results   Component Value Date    MCH 28.1 04/19/2016     Lab Results   Component Value Date    MCHC 32.2 04/19/2016     Lab Results   Component Value Date    RDW 13.9 04/19/2016     Lab Results   Component Value Date     04/19/2016     Lab Results   Component Value Date    CHOL 195 01/17/2017     Lab Results   Component Value Date    HDL 56 01/17/2017     Lab Results   Component Value Date     01/17/2017     Lab Results   Component Value Date    TRIG 114 01/17/2017     No results found for: CHOLHDLRATIO

## 2017-04-26 DIAGNOSIS — M54.9 UPPER BACK PAIN: ICD-10-CM

## 2017-04-26 RX ORDER — TIZANIDINE 2 MG/1
TABLET ORAL
Qty: 60 TABLET | Refills: 1 | Status: SHIPPED | OUTPATIENT
Start: 2017-04-26 | End: 2017-06-20

## 2017-04-26 NOTE — TELEPHONE ENCOUNTER
Tizanidine      Last Written Prescription Date: 11/30/16  Last Fill Quantity: 60,  # refills: 3   Last Office Visit with [facility], [facility] or [facility] prescribing provider: 01/02/17    Lia Kimball MA

## 2017-04-26 NOTE — TELEPHONE ENCOUNTER
Reason for Call:  Medication Refill    Do you use a [facility]?  Name of the pharmacy and phone number for the current request:    [facility] #[id number] - [place], [place] - [address].    Name of the medication requested: tiZANidine (ZANAFLEX) 2 MG tablet      Can we leave a detailed message on this number? YES    Phone number [facility] can be reached at:  [phone]        Call taken on 4/26/2017 at 3:11 PM by Thi Mann  .

## 2017-04-26 NOTE — TELEPHONE ENCOUNTER
Routing refill request to provider for review/approval because:  Drug not on the [facility] refill protocol     Margarette Franco RN

## 2017-07-11 DIAGNOSIS — I10 ESSENTIAL HYPERTENSION WITH GOAL BLOOD PRESSURE LESS THAN 140/90: Chronic | ICD-10-CM

## 2017-07-11 RX ORDER — LISINOPRIL 40 MG/1
TABLET ORAL
Qty: 90 TABLET | Refills: 1 | Status: SHIPPED | OUTPATIENT
Start: 2017-07-11 | End: 2017-11-14

## 2017-07-11 NOTE — TELEPHONE ENCOUNTER
lisinopril (PRINIVIL/ZESTRIL) 40 MG tablet        Last Written Prescription Date: 10/14/2016  Last Fill Quantity: 90, # refills: 2  Last Office Visit with [facility] or [facility] prescribing provider: 1/2/2017       Potassium   Date Value Ref Range Status   01/17/2017 3.9 3.4 - 5.3 mmol/L Final     Creatinine   Date Value Ref Range Status   01/17/2017 0.72 0.52 - 1.04 mg/dL Final     BP Readings from Last 3 Encounters:   02/14/17 149/69   01/02/17 131/69   08/02/16 138/70

## 2017-08-21 DIAGNOSIS — F51.01 PRIMARY INSOMNIA: ICD-10-CM

## 2017-08-21 NOTE — TELEPHONE ENCOUNTER
QUEtiapine (SEROQUEL) 100 MG tablet       Last Written Prescription Date: 3/27/2017  Last Fill Quantity: 150, # refills: 0  Last Office Visit with [facility], [facility] or [facility] prescribing provider: 1/2/2017       BP Readings from Last 3 Encounters:   02/14/17 149/69   01/02/17 131/69   08/02/16 138/70     Pulse Readings from Last 2 Encounters:   02/14/17 59   01/02/17 60     Lab Results   Component Value Date     01/17/2017     Lab Results   Component Value Date    WBC 5.8 04/19/2016     Lab Results   Component Value Date    RBC 4.27 04/19/2016     Lab Results   Component Value Date    HGB 12.0 04/19/2016     Lab Results   Component Value Date    HCT 37.3 04/19/2016     No components found for: MCT  Lab Results   Component Value Date    MCV 87 04/19/2016     Lab Results   Component Value Date    MCH 28.1 04/19/2016     Lab Results   Component Value Date    MCHC 32.2 04/19/2016     Lab Results   Component Value Date    RDW 13.9 04/19/2016     Lab Results   Component Value Date     04/19/2016     Lab Results   Component Value Date    CHOL 195 01/17/2017     Lab Results   Component Value Date    HDL 56 01/17/2017     Lab Results   Component Value Date     01/17/2017     Lab Results   Component Value Date    TRIG 114 01/17/2017     No results found for: JOANNE

## 2017-08-22 RX ORDER — QUETIAPINE FUMARATE 100 MG/1
TABLET, FILM COATED ORAL
Qty: 150 TABLET | Refills: 0 | Status: SHIPPED | OUTPATIENT
Start: 2017-08-22 | End: 2017-11-14

## 2017-08-24 DIAGNOSIS — I10 BENIGN ESSENTIAL HYPERTENSION: ICD-10-CM

## 2017-08-24 RX ORDER — AMLODIPINE BESYLATE 5 MG/1
TABLET ORAL
Qty: 90 TABLET | Refills: 1 | Status: SHIPPED | OUTPATIENT
Start: 2017-08-24 | End: 2017-11-14

## 2017-08-24 NOTE — TELEPHONE ENCOUNTER
amLODIPine (NORVASC) 5 MG tablet      Last Written Prescription Date: 4/5/17  Last Fill Quantity: 90, # refills: 1    Last Office Visit with [facility], [facility] or [facility] prescribing provider:  1/02/17   Future Office Visit:        BP Readings from Last 3 Encounters:   02/14/17 149/69   01/02/17 131/69   08/02/16 138/70         Suzanne PEREZ)

## 2017-08-24 NOTE — TELEPHONE ENCOUNTER
Prescription approved per [facility] Refill Protocol.  Nanette Sandoval RN

## 2017-11-06 DIAGNOSIS — J45.20 INTERMITTENT ASTHMA, UNCOMPLICATED: ICD-10-CM

## 2017-11-08 RX ORDER — ALBUTEROL SULFATE 90 UG/1
AEROSOL, METERED RESPIRATORY (INHALATION)
Qty: 8.5 INHALER | Refills: 0 | Status: SHIPPED | OUTPATIENT
Start: 2017-11-08 | End: 2017-11-14

## 2017-11-08 NOTE — TELEPHONE ENCOUNTER
Medication is being filled for 1 time refill only due to:  Patient needs to be seen because due for OV.   Luh PAGE RN

## 2017-11-08 NOTE — TELEPHONE ENCOUNTER
Routing to TCs to contact patient to inform due for appointment. Please route back to refill pool once scheduled.     Luh PAGE RN        Requested Prescriptions   Pending Prescriptions Disp Refills     PROAIR  (90 BASE) MCG/ACT inhaler [Pharmacy Med Name: PROAIR HFA 90 MCG INHALER] 8.5 Inhaler 0     Sig: INHALE 2 PUFFS INTO THE LUNGS EVERY 4 HOURS AS NEEDED FOR SHORTNESS OF BREATH / DYSPNEA OR WHEEZING    Asthma Maintenance Inhalers - Anticholinergics Failed    11/6/2017  3:43 PM       Failed - Asthma control test score is 20 or greater in last 6 months    Please review ACT score.          Failed - Recent (6 mo) or future visit with authorizing provider's specialty    Patient had office visit in the last 6 months or has a visit in the next 30 days with authorizing provider.  See chart review.            Passed - Patient is age 12 years or older        Date of Last Asthma Action Plan Letter:   Asthma Action Plan Q1 Year    Topic Date Due     Asthma Action Plan - yearly  01/02/2018      Asthma Control Test:   ACT Total Scores 1/2/2017   ACT TOTAL SCORE -   ASTHMA ER VISITS -   ASTHMA HOSPITALIZATIONS -   ACT TOTAL SCORE (Goal Greater than or Equal to 20) 23   In the past 12 months, how many times did you visit the emergency room for your asthma without being admitted to the hospital? 0   In the past 12 months, how many times were you hospitalized overnight because of your asthma? 0

## 2017-11-14 ENCOUNTER — OFFICE VISIT (OUTPATIENT)
Dept: FAMILY MEDICINE | Facility: CLINIC | Age: 72
End: 2017-11-14
Payer: MEDICARE

## 2017-11-14 VITALS
OXYGEN SATURATION: 97 % | TEMPERATURE: 98.7 F | HEIGHT: 62 IN | BODY MASS INDEX: 29.63 KG/M2 | SYSTOLIC BLOOD PRESSURE: 139 MMHG | HEART RATE: 65 BPM | DIASTOLIC BLOOD PRESSURE: 70 MMHG | WEIGHT: 161 LBS

## 2017-11-14 DIAGNOSIS — J45.20 INTERMITTENT ASTHMA, UNCOMPLICATED: ICD-10-CM

## 2017-11-14 DIAGNOSIS — M54.9 UPPER BACK PAIN: ICD-10-CM

## 2017-11-14 DIAGNOSIS — F51.01 PRIMARY INSOMNIA: ICD-10-CM

## 2017-11-14 DIAGNOSIS — I10 BENIGN ESSENTIAL HYPERTENSION: ICD-10-CM

## 2017-11-14 PROCEDURE — 99214 OFFICE O/P EST MOD 30 MIN: CPT | Performed by: INTERNAL MEDICINE

## 2017-11-14 RX ORDER — TIZANIDINE 2 MG/1
2 TABLET ORAL 2 TIMES DAILY PRN
Qty: 60 TABLET | Refills: 3 | Status: SHIPPED | OUTPATIENT
Start: 2017-11-14 | End: 2018-04-09

## 2017-11-14 RX ORDER — LISINOPRIL 40 MG/1
40 TABLET ORAL DAILY
Qty: 90 TABLET | Refills: 3 | Status: SHIPPED | OUTPATIENT
Start: 2017-11-14 | End: 2018-11-02

## 2017-11-14 RX ORDER — MONTELUKAST SODIUM 10 MG/1
10 TABLET ORAL AT BEDTIME
Qty: 90 TABLET | Refills: 3 | Status: SHIPPED | OUTPATIENT
Start: 2017-11-14 | End: 2018-11-02

## 2017-11-14 RX ORDER — ALBUTEROL SULFATE 90 UG/1
2 AEROSOL, METERED RESPIRATORY (INHALATION) EVERY 4 HOURS PRN
Qty: 8.5 INHALER | Refills: 11 | Status: SHIPPED | OUTPATIENT
Start: 2017-11-14 | End: 2019-02-11

## 2017-11-14 RX ORDER — AMLODIPINE BESYLATE 5 MG/1
5 TABLET ORAL DAILY
Qty: 90 TABLET | Refills: 3 | Status: SHIPPED | OUTPATIENT
Start: 2017-11-14 | End: 2018-12-14

## 2017-11-14 RX ORDER — QUETIAPINE FUMARATE 100 MG/1
150 TABLET, FILM COATED ORAL AT BEDTIME
Qty: 150 TABLET | Refills: 3 | Status: SHIPPED | OUTPATIENT
Start: 2017-11-14 | End: 2018-11-08

## 2017-11-14 NOTE — PROGRESS NOTES
SUBJECTIVE:   Carmen Bonner is a 72 year old female who presents to clinic today for the following health issues:      Hyperlipidemia Follow-Up      Rate your low fat/cholesterol diet?: good    Taking statin?  No    Other lipid medications/supplements?:  none    Hypertension Follow-up      Outpatient blood pressures are not being checked.    Low Salt Diet: no added salt    Anxiety Follow-Up    Status since last visit: No change    Other associated symptoms:None    Complicating factors:   Significant life event: No   Current substance abuse: None  Depression symptoms: No  ELIO-7 SCORE 1/2/2017   Total Score 4       GAD7    Asthma Follow-Up    Was ACT completed today?    Yes    ACT Total Scores 11/14/2017   ACT TOTAL SCORE -   ASTHMA ER VISITS -   ASTHMA HOSPITALIZATIONS -   ACT TOTAL SCORE (Goal Greater than or Equal to 20) 23   In the past 12 months, how many times did you visit the emergency room for your asthma without being admitted to the hospital? 0   In the past 12 months, how many times were you hospitalized overnight because of your asthma? 0       Recent asthma triggers that patient is dealing with: None      Chronic Pain Follow-Up       Type / Location of Pain: all over muscular pain  Analgesia/pain control:       Recent changes:  Somewhat better      Overall control: Comfortably manageable  Activity level/function:      Daily activities:  Can do most things most days, with some rest    Work:  not applicable  Adverse effects:  No  Adherance    Taking medication as directed?  Yes    Participating in other treatments: not applicable  Risk Factors:    Sleep:  Fair    Mood/anxiety:  controlled    Recent family or social stressors:  none noted    Other aggravating factors: none  PHQ-9 SCORE 1/2/2017   Total Score 5     ELIO-7 SCORE 1/2/2017   Total Score 4     Encounter-Level CSA:     There are no encounter-level csa.                Amount of exercise or physical activity: 6-7 days/week for an average of 15-30  minutes    Problems taking medications regularly: No    Medication side effects: none  Diet: low salt and low fat/cholesterol      Patient isn't taking Calcium or Vitamin D supplements due to gastric side effects    Problem list and histories reviewed & adjusted, as indicated.  Additional history: as documented    Patient Active Problem List   Diagnosis     BMI 31     CARDIOVASCULAR SCREENING; LDL GOAL LESS THAN 130     HTN, goal below 140/90     Intermittent asthma     GERD (gastroesophageal reflux disease)     Insomnia     Arthritis     Lumbar spondylosis     Anxiety     Upper back pain     Hypertension     Pain in thoracic spine     Chronic pain     Stool incontinence     Advanced directives, counseling/discussion     Allergic rhinitis     Hyperlipidemia, unspecified hyperlipidemia type     Past Surgical History:   Procedure Laterality Date     CHOLECYSTECTOMY       HYSTERECTOMY TOTAL ABDOMINAL         Social History   Substance Use Topics     Smoking status: Former Smoker     Packs/day: 1.00     Years: 26.00     Smokeless tobacco: Never Used     Alcohol use No      Comment: Attends AA Meetings     Family History   Problem Relation Age of Onset     CANCER Mother      OSTEOPOROSIS Mother      CEREBROVASCULAR DISEASE Father      Alzheimer Disease Father      Genitourinary Problems Maternal Grandmother      HEART DISEASE Paternal Grandfather      OSTEOPOROSIS Sister      Arthritis Sister      Breast Cancer Paternal Grandmother      Colon Cancer No family hx of          Current Outpatient Prescriptions   Medication Sig Dispense Refill     QUEtiapine (SEROQUEL) 100 MG tablet Take 1.5 tablets (150 mg) by mouth At Bedtime 150 tablet 3     albuterol (PROAIR HFA) 108 (90 BASE) MCG/ACT Inhaler Inhale 2 puffs into the lungs every 4 hours as needed for shortness of breath / dyspnea or wheezing 8.5 Inhaler 11     tiZANidine (ZANAFLEX) 2 MG tablet Take 1 tablet (2 mg) by mouth 2 times daily as needed for muscle spasms 60  tablet 3     amLODIPine (NORVASC) 5 MG tablet Take 1 tablet (5 mg) by mouth daily 90 tablet 3     lisinopril (PRINIVIL/ZESTRIL) 40 MG tablet Take 1 tablet (40 mg) by mouth daily 90 tablet 3     montelukast (SINGULAIR) 10 MG tablet Take 1 tablet (10 mg) by mouth At Bedtime 90 tablet 3     calcium carbonate (TUMS) 500 MG chewable tablet Take 1 chew tab by mouth daily       mometasone (NASONEX) 50 MCG/ACT nasal spray Spray 2 sprays into both nostrils daily 1 Box 3     triamcinolone (KENALOG) 0.1 % cream Apply topically 2 times daily 45 g 1     vitamin  B complex with vitamin C (VITAMIN  B COMPLEX) TABS Take 1 tablet by mouth daily       acetaminophen (ACETAMINOPHEN EXTRA STRENGTH) 500 MG tablet Take 500-1,000 mg by mouth every 6 hours as needed for mild pain       Cranberry 140-100-3 MG-MG-UNIT CAPS Take 1 capsule by mouth daily       ValACYclovir HCl (VALTREX PO) Take by mouth as needed Herpes Outbreak       [DISCONTINUED] PROAIR  (90 BASE) MCG/ACT inhaler INHALE 2 PUFFS INTO THE LUNGS EVERY 4 HOURS AS NEEDED FOR SHORTNESS OF BREATH / DYSPNEA OR WHEEZING 8.5 Inhaler 0     [DISCONTINUED] amLODIPine (NORVASC) 5 MG tablet TAKE 1 TABLET BY MOUTH EVERY DAY 90 tablet 1     [DISCONTINUED] QUEtiapine (SEROQUEL) 100 MG tablet TAKE 1 & 1/2 TABLETS BY MOUTH DAILY 150 tablet 0     [DISCONTINUED] lisinopril (PRINIVIL/ZESTRIL) 40 MG tablet TAKE 1 TABLET (40 MG) BY MOUTH DAILY 90 tablet 1     [DISCONTINUED] montelukast (SINGULAIR) 10 MG tablet Take 1 tablet (10 mg) by mouth At Bedtime 90 tablet 3     Allergies   Allergen Reactions     Omeprazole Diarrhea     Sulfa Drugs Itching     Medications and Labs reviewed in EPIC      Reviewed and updated as needed this visit by clinical staffTobacco  Allergies  Meds  Problems  Med Hx  Surg Hx  Fam Hx  Soc Hx        Reviewed and updated as needed this visit by Provider       ROS:  Constitutional, HEENT, cardiovascular, pulmonary, GI, , musculoskeletal, neuro, skin, endocrine  "and psych systems are negative, except as otherwise noted.    This document serves as a record of the services and decisions personally performed and made by Maribell Castaneda MD. It was created on her behalf by Courtney Bauer, a trained medical scribe. The creation of this document is based on the provider's statements to the medical scribe.  Courtney Bauer 4:20 PM November 14, 2017    OBJECTIVE:   /70  Pulse 65  Temp 98.7  F (37.1  C) (Tympanic)  Ht 1.568 m (5' 1.75\")  Wt 73 kg (161 lb)  SpO2 97%  BMI 29.69 kg/m2  Body mass index is 29.69 kg/(m^2).    GENERAL APPEARANCE: healthy, alert and no distress  EYES: Eyes grossly normal to inspection, PERRL and conjunctivae and sclerae normal  HENT: ear canals and TM's normal and nose and mouth without ulcers or lesions  NECK: no adenopathy  RESP: lungs clear to auscultation - no rales, rhonchi or wheezes  CV: regular rates and rhythm, normal S1 S2, no S3    Diagnostic Test Results:  No results found for this or any previous visit (from the past 24 hour(s)).    ASSESSMENT/PLAN:     Diagnoses and all orders for this visit:    Primary insomnia  -     QUEtiapine (SEROQUEL) 100 MG tablet; Take 1.5 tablets (150 mg) by mouth At Bedtime  Reports taking Seroquel as 100 mg per day and sometimes less if she can  Well-compliant with medication  Sleeping well    Intermittent asthma, uncomplicated  -     albuterol (PROAIR HFA) 108 (90 BASE) MCG/ACT Inhaler; Inhale 2 puffs into the lungs every 4 hours as needed for shortness of breath / dyspnea or wheezing  -     montelukast (SINGULAIR) 10 MG tablet; Take 1 tablet (10 mg) by mouth At Bedtime  Well-complaint with medication  Hasn't had to use her rescue inhaler    Upper back pain  -     tiZANidine (ZANAFLEX) 2 MG tablet; Take 1 tablet (2 mg) by mouth 2 times daily as needed for muscle spasms  She reports the pain as having improved over the last year  She takes Zanaflex only as needed    Benign essential hypertension  -     " amLODIPine (NORVASC) 5 MG tablet; Take 1 tablet (5 mg) by mouth daily  -     lisinopril (PRINIVIL/ZESTRIL) 40 MG tablet; Take 1 tablet (40 mg) by mouth daily  Well-compliant with medication  Doing well    I advised patient to take Calcium and Vitamin D supplementation, but she reports not tolerating Calcium due to GI side effects  I advised her to take Vitamin D supplements     Flu shot already taken  Mammogram up to date, next one scheduled in 01/18    Patient Instructions   I refilled your prescriptions  Schedule a physical at your earliest convenience  Patient is advised to monitor her blood pressure once or twice a month at home.  Bring those readings on your next visit.  Notify us if your blood pressure readings consistently stays greater than 140/90.  Follow up in 6 months  Seek sooner medical attention if there is any worsening of symptoms or problems.        The information in this document, created by the medical scribe for me, accurately reflects the services I personally performed and the decisions made by me. I have reviewed and approved this document for accuracy prior to leaving the patient care area.  November 14, 2017 4:32 PM    Maribell Castaneda MD  [facility]

## 2017-11-14 NOTE — MR AVS SNAPSHOT
"              After Visit Summary   11/14/2017    Carmen Bonner    MRN: [id number]           Patient Information     Date Of Birth          1945        Visit Information        Provider Department      11/14/2017 4:00 PM Maribell Castaneda MD [facility]        Today's Diagnoses     Primary insomnia        Intermittent asthma, uncomplicated        Upper back pain        Benign essential hypertension          Care Instructions    I refilled your prescriptions  Schedule a physical at your earliest convenience  Patient is advised to monitor her blood pressure once or twice a month at home.  Bring those readings on your next visit.  Notify us if your blood pressure readings consistently stays greater than 140/90.  Follow up in 6 months  Seek sooner medical attention if there is any worsening of symptoms or problems.              Follow-ups after your visit        Who to contact     If you have questions or need follow up information about today's clinic visit or your schedule please contact [facility] directly at [phone].  Normal or non-critical lab and imaging results will be communicated to you by MyChart, letter or phone within 4 business days after the clinic has received the results. If you do not hear from us within 7 days, please contact the clinic through [employer] or phone. If you have a critical or abnormal lab result, we will notify you by phone as soon as possible.  Submit refill requests through [employer] or call your pharmacy and they will forward the refill request to us. Please allow 3 business days for your refill to be completed.          Additional Information About Your Visit        MyChart Information     [employer] lets you send messages to your doctor, view your test results, renew your prescriptions, schedule appointments and more. To sign up, go to [email or web address] . Click on \"Log in\" on the left side of the screen, which will take you to the Welcome page. Then " "click on \"Sign up Now\" on the right side of the page.     You will be asked to enter the access code listed below, as well as some personal information. Please follow the directions to create your username and password.     Your access code is: [email or web address]  Expires: 2018  4:29 PM     Your access code will  in 90 days. If you need help or a new code, please call your [place] clinic or [phone].        Care EveryWhere ID     This is your Care EveryWhere ID. This could be used by other organizations to access your [place] medical records  [id number]        Your Vitals Were     Pulse Temperature Height Pulse Oximetry BMI (Body Mass Index)       65 98.7  F (37.1  C) (Tympanic) 5' 1.75\" (1.568 m) 97% 29.69 kg/m2        Blood Pressure from Last 3 Encounters:   17 139/70   17 149/69   17 131/69    Weight from Last 3 Encounters:   17 161 lb (73 kg)   17 146 lb 3.2 oz (66.3 kg)   17 144 lb (65.3 kg)              Today, you had the following     No orders found for display         Today's Medication Changes          These changes are accurate as of: 17  4:29 PM.  If you have any questions, ask your nurse or doctor.               These medicines have changed or have updated prescriptions.        Dose/Directions    albuterol 108 (90 BASE) MCG/ACT Inhaler   Commonly known as:  PROAIR HFA   This may have changed:  See the new instructions.   Used for:  Intermittent asthma, uncomplicated   Changed by:  Maribell Castaneda MD        Dose:  2 puff   Inhale 2 puffs into the lungs every 4 hours as needed for shortness of breath / dyspnea or wheezing   Quantity:  8.5 Inhaler   Refills:  11       amLODIPine 5 MG tablet   Commonly known as:  NORVASC   This may have changed:  See the new instructions.   Used for:  Benign essential hypertension   Changed by:  Maribell Castaneda MD        Dose:  5 mg   Take 1 tablet (5 mg) by mouth daily   Quantity:  90 tablet   Refills:  3       " lisinopril 40 MG tablet   Commonly known as:  PRINIVIL/ZESTRIL   This may have changed:  See the new instructions.   Used for:  Benign essential hypertension   Changed by:  Maribell Castaneda MD        Dose:  40 mg   Take 1 tablet (40 mg) by mouth daily   Quantity:  90 tablet   Refills:  3       QUEtiapine 100 MG tablet   Commonly known as:  SEROquel   This may have changed:  See the new instructions.   Used for:  Primary insomnia   Changed by:  Maribell Castaneda MD        Dose:  150 mg   Take 1.5 tablets (150 mg) by mouth At Bedtime   Quantity:  150 tablet   Refills:  3       tiZANidine 2 MG tablet   Commonly known as:  ZANAFLEX   This may have changed:  See the new instructions.   Used for:  Upper back pain   Changed by:  Maribell Castaneda MD        Dose:  2 mg   Take 1 tablet (2 mg) by mouth 2 times daily as needed for muscle spasms   Quantity:  60 tablet   Refills:  3         Stop taking these medicines if you haven't already. Please contact your care team if you have questions.     estradiol 0.1 MG/GM cream   Commonly known as:  ESTRACE   Stopped by:  Maribell Castaneda MD                Where to get your medicines      These medications were sent to [facility]/pharmacy #[id number] - [place], [place] - [address]  [address] [address]     Phone:  [phone]     albuterol 108 (90 BASE) MCG/ACT Inhaler    amLODIPine 5 MG tablet    lisinopril 40 MG tablet    montelukast 10 MG tablet    QUEtiapine 100 MG tablet    tiZANidine 2 MG tablet                Primary Care Provider Office Phone # Fax #    Maribell Castaneda [phone] [address]  [facility] [address]        Equal Access to Services     [facility] AH: Hadii aad rudy Mueller, waaxda luqadaha, qaybta kaalmada adeegyada, hyun butt. So [facility] [phone].    ATENCIÓN: Si habla español, tiene a lantigua disposición servicios gratuitos de asistencia lingüística. Llame al  [phone].    We comply with applicable federal civil rights laws and [place] laws. We do not discriminate on the basis of race, color, national origin, age, disability, sex, sexual orientation, or gender identity.            Thank you!     Thank you for choosing [facility]  for your care. Our goal is always to provide you with excellent care. Hearing back from our patients is one way we can continue to improve our services. Please take a few minutes to complete the written survey that you may receive in the mail after your visit with us. Thank you!             Your Updated Medication List - Protect others around you: Learn how to safely use, store and throw away your medicines at www.disposemymeds.org.          This list is accurate as of: 11/14/17  4:29 PM.  Always use your most recent med list.                   Brand Name Dispense Instructions for use Diagnosis    ACETAMINOPHEN EXTRA STRENGTH 500 MG tablet   Generic drug:  acetaminophen      Take 500-1,000 mg by mouth every 6 hours as needed for mild pain        albuterol 108 (90 BASE) MCG/ACT Inhaler    PROAIR HFA    8.5 Inhaler    Inhale 2 puffs into the lungs every 4 hours as needed for shortness of breath / dyspnea or wheezing    Intermittent asthma, uncomplicated       amLODIPine 5 MG tablet    NORVASC    90 tablet    Take 1 tablet (5 mg) by mouth daily    Benign essential hypertension       calcium carbonate 500 MG chewable tablet    TUMS     Take 1 chew tab by mouth daily        Cranberry 140-100-3 MG-MG-UNIT Caps      Take 1 capsule by mouth daily        lisinopril 40 MG tablet    PRINIVIL/ZESTRIL    90 tablet    Take 1 tablet (40 mg) by mouth daily    Benign essential hypertension       mometasone 50 MCG/ACT spray    NASONEX    1 Box    Spray 2 sprays into both nostrils daily    Allergic rhinitis due to pollen       montelukast 10 MG tablet    SINGULAIR    90 tablet    Take 1 tablet (10 mg) by mouth At Bedtime    Intermittent asthma,  uncomplicated       QUEtiapine 100 MG tablet    SEROquel    150 tablet    Take 1.5 tablets (150 mg) by mouth At Bedtime    Primary insomnia       tiZANidine 2 MG tablet    ZANAFLEX    60 tablet    Take 1 tablet (2 mg) by mouth 2 times daily as needed for muscle spasms    Upper back pain       triamcinolone 0.1 % cream    KENALOG    45 g    Apply topically 2 times daily        VALTREX PO      Take by mouth as needed Herpes Outbreak        vitamin B complex with vitamin C Tabs tablet      Take 1 tablet by mouth daily

## 2017-11-14 NOTE — PATIENT INSTRUCTIONS
I refilled your prescriptions  Schedule a physical at your earliest convenience  Patient is advised to monitor her blood pressure once or twice a month at home.  Bring those readings on your next visit.  Notify us if your blood pressure readings consistently stays greater than 140/90.  Follow up in 6 months  Seek sooner medical attention if there is any worsening of symptoms or problems.

## 2017-11-14 NOTE — NURSING NOTE
"No chief complaint on file.      Initial /77 (BP Location: Right arm, Cuff Size: Adult Large)  Pulse 65  Temp 98.7  F (37.1  C) (Tympanic)  Ht 5' 1.75\" (1.568 m)  Wt 161 lb (73 kg)  SpO2 97%  BMI 29.69 kg/m2 Estimated body mass index is 29.69 kg/(m^2) as calculated from the following:    Height as of this encounter: 5' 1.75\" (1.568 m).    Weight as of this encounter: 161 lb (73 kg).  Medication Reconciliation: complete     Lia Kimball MA    "

## 2017-11-15 ASSESSMENT — ASTHMA QUESTIONNAIRES: ACT_TOTALSCORE: 23

## 2017-11-16 DIAGNOSIS — F51.01 PRIMARY INSOMNIA: ICD-10-CM

## 2017-11-17 RX ORDER — QUETIAPINE FUMARATE 100 MG/1
TABLET, FILM COATED ORAL
Qty: 150 TABLET | Refills: 0 | OUTPATIENT
Start: 2017-11-17

## 2018-04-24 ENCOUNTER — OFFICE VISIT (OUTPATIENT)
Dept: PODIATRY | Facility: CLINIC | Age: 73
End: 2018-04-24
Payer: MEDICARE

## 2018-04-24 VITALS
HEIGHT: 62 IN | DIASTOLIC BLOOD PRESSURE: 76 MMHG | WEIGHT: 153 LBS | SYSTOLIC BLOOD PRESSURE: 148 MMHG | HEART RATE: 68 BPM | BODY MASS INDEX: 28.16 KG/M2

## 2018-04-24 DIAGNOSIS — B35.3 TINEA PEDIS OF LEFT FOOT: Primary | ICD-10-CM

## 2018-04-24 PROCEDURE — 99213 OFFICE O/P EST LOW 20 MIN: CPT | Performed by: PODIATRIST

## 2018-04-24 ASSESSMENT — PAIN SCALES - GENERAL: PAINLEVEL: MILD PAIN (2)

## 2018-04-24 NOTE — PATIENT INSTRUCTIONS
Try Lamisil AT cream over the counter        [facility] Locations        Mondays Tuesdays   [facility] [place]   [address]   [place] [address] [place] [address]   ph: [phone] ph: [phone]   fax: [phone] fax: [phone]       Wednesdays Thursdays - Surgery   [facility] - [place]: [phone]   [address] [place] [address] To Schedule an appointment please call:   ph: [phone]   fax: [phone]      Follow up as needed        Patient to follow up with Primary Care provider regarding elevated blood pressure.    ATHLETE'S FOOT (TINEA PEDIS)  It is a rash that is caused by a fungus (most commonly Dermatophytes) in the outer layer of skin on the foot or in the nails. It can occur between the toes or on the instep and heel areas of the feet. Fungus will grow in warm and moist environments. The fungus that causes athlete's foot is contagious and you can get it from touching someone who has it of walking around bare foot around swimming pools, gyms, saunas, communal baths and showers, fitness centers or locker rooms.     SYMPTOMS  The symptoms of athlete's foot are numerous and include; itching, burning or stinging between the toes or on the soles of the feet, blisters, cracked and peeling skin, excessive dryness or excessive scaling on the bottom or sides of the feet, redness and softness with breaking down of the skin, especially between the toes, foot odor and thick, crumbly nails. Older males or people who live in warm humid environments are more prone to get it but it can develop at any age.    TREATMENT OPTIONS  Typically it can be treated with antifungal creams, lotions, ointments, sprays, or gels.  Many are available over the counter, some are prescription. It is important that you use them long enough, typically 4 weeks, to clear the rash. If  an infection is particularly bad, your provider may prescribe oral medication. It is important that you follow the directions for any medication carefully to prevent recurrence of the rash.    HOME TREATMENT  1.  Keep feet clean and dry  2.  Dry between your toes any time your feet become wet  3.  Wear socks that are made of cotton, wool, or fibers designed to wick moisture away  from skin. Nylon, lycra, and spandex tend to trap moisture.  4.  If you have blistering, you may soak your feet in Burow's solution (aluminum acetate is active ingredient. Domeboro is a brand sold at [employer]). This is available over the counter.  5.  Treat shows with antifungal powder  6.  Tea Tree oil may help reduce symptoms but does not cure the rash.    PREVENTION  1.  Change socks or stockings regularly.  2.  Use talcum powder on your feet if they sweat a lot.  3.  Do not borrow shoes.  4.  Let shoes dry out for 24 hours before wearing them again.  5.  Treat shoes with fungal powder  6.  Wear shoes that are well ventilated such as leather shoes or sandals.  Avoid shoes  made of synthetic materials such as vinyl or rubber.  7.  Wear water proof sandals when you are in public areas such as locker rooms, pools, communal baths, showers, saunas, and fitness centers.    FYI: Call or seek medical attention IMMEDIATELY if:  - You develop a fever over 100.4F for more than 24 hrs.  - There is a discharge of pus from infected areas.  - Red streaks develop from the affected areas  - Increase in redness, warmth, pain, swelling, or tenderness in the affected areas.

## 2018-04-24 NOTE — NURSING NOTE
"Chief Complaint   Patient presents with     Establish Care     Foot Pain     left foot x 4 days / no injury / hammer toes       Initial /76  Pulse 68  Ht 5' 2\" (1.575 m)  Wt 153 lb (69.4 kg)  BMI 27.98 kg/m2 Estimated body mass index is 27.98 kg/(m^2) as calculated from the following:    Height as of this encounter: 5' 2\" (1.575 m).    Weight as of this encounter: 153 lb (69.4 kg).  Medication Reconciliation: complete    "

## 2018-04-24 NOTE — PROGRESS NOTES
PATIENT HISTORY:  Carmen Bonner is a 72 year old female who presents to clinic for left 3rd toe rash.  4 day duration.  No injury.  She has tried steroid cream w/o resolution.  2-3/10 pain.  Worse with pressure.      Review of Systems:  Patient denies fever, chills, wound, stiffness, limping, numbness, weakness, blood in stool, chest pain with activity, calf pain when walking, shortness of breath with activity, chronic cough, easy bleeding/bruising, swelling of ankles, excessive thirst, fatigue, depression.  Patient admits to rash, heartburn, anxiety.     PAST MEDICAL HISTORY:   Past Medical History:   Diagnosis Date     Allergic rhinitis      Anxiety     on seroquel     Asthma      Cervicalgia      Chronic pain      Diabetes (H)     now diet controlled     DJD (degenerative joint disease) of lumbar spine     CHRONIC HAS SEEN PAIN CLINIC- put on tizanidine      Endometriosis      Finger pain      Finger swelling      Flank pain      GERD (gastroesophageal reflux disease)      Hematuria      Herpes      Hypertension      Hyponatremia      Insomnia     on seroquel     Insomnia      Knee pain      Purpura (H)     SAW DERM      Recurrent UTI      Skin cancer     basal cell      Stress incontinence      Upper back pain         PAST SURGICAL HISTORY:   Past Surgical History:   Procedure Laterality Date     CHOLECYSTECTOMY       HYSTERECTOMY TOTAL ABDOMINAL          MEDICATIONS:   Current Outpatient Prescriptions:      acetaminophen (ACETAMINOPHEN EXTRA STRENGTH) 500 MG tablet, Take 500-1,000 mg by mouth every 6 hours as needed for mild pain, Disp: , Rfl:      albuterol (PROAIR HFA) 108 (90 BASE) MCG/ACT Inhaler, Inhale 2 puffs into the lungs every 4 hours as needed for shortness of breath / dyspnea or wheezing, Disp: 8.5 Inhaler, Rfl: 11     amLODIPine (NORVASC) 5 MG tablet, Take 1 tablet (5 mg) by mouth daily, Disp: 90 tablet, Rfl: 3     calcium carbonate (TUMS) 500 MG chewable tablet, Take 1 chew tab by mouth daily,  Disp: , Rfl:      Cranberry 140-100-3 MG-MG-UNIT CAPS, Take 1 capsule by mouth daily, Disp: , Rfl:      lisinopril (PRINIVIL/ZESTRIL) 40 MG tablet, Take 1 tablet (40 mg) by mouth daily, Disp: 90 tablet, Rfl: 3     mometasone (NASONEX) 50 MCG/ACT nasal spray, Spray 2 sprays into both nostrils daily, Disp: 1 Box, Rfl: 3     montelukast (SINGULAIR) 10 MG tablet, Take 1 tablet (10 mg) by mouth At Bedtime, Disp: 90 tablet, Rfl: 3     QUEtiapine (SEROQUEL) 100 MG tablet, Take 1.5 tablets (150 mg) by mouth At Bedtime, Disp: 150 tablet, Rfl: 3     tiZANidine (ZANAFLEX) 2 MG tablet, TAKE 1 TABLET (2 MG) BY MOUTH 2 TIMES DAILY AS NEEDED FOR MUSCLE SPASMS, Disp: 60 tablet, Rfl: 1     triamcinolone (KENALOG) 0.1 % cream, Apply topically 2 times daily, Disp: 45 g, Rfl: 1     ValACYclovir HCl (VALTREX PO), Take by mouth as needed Herpes Outbreak, Disp: , Rfl:      vitamin  B complex with vitamin C (VITAMIN  B COMPLEX) TABS, Take 1 tablet by mouth daily, Disp: , Rfl:      ALLERGIES:    Allergies   Allergen Reactions     Omeprazole Diarrhea     Sulfa Drugs Itching        SOCIAL HISTORY:   Social History     Social History     Marital status: Single     Spouse name: N/A     Number of children: N/A     Years of education: N/A     Occupational History     Not on file.     Social History Main Topics     Smoking status: Former Smoker     Packs/day: 1.00     Years: 26.00     Smokeless tobacco: Never Used     Alcohol use No      Comment: Attends AA Meetings     Drug use: No     Sexual activity: No     Other Topics Concern     Parent/Sibling W/ Cabg, Mi Or Angioplasty Before 65f 55m? No     Social History Narrative    Health maintenance             ADVANCED DIRECTIVE PAMPHLET     COLONOSCOPY     DEXA     ANNUAL WELLNESS VISIT    ASTHMA CONTROL TEST/ ACTION PLAN             LOOSE STOOLS       Duration: frequent looser stools / gurgling noises        No improvement with the fodmaps diet        Better off nexium/         Now on prevacid        "   Associated flank pain: None      Intensity:  moderate      Accompanying signs and symptoms:         Fever/Chills: no         Gas/Bloating: YES         Nausea/vomitting: no         Diarrhea: YES         Dysuria or Hematuria: no      Now regulating it with diet      Precipitating or alleviating factors:         Pain worse with eating/BM/urination: yes            Therapies tried and outcome: fodmap diet        LMP:  not applicable                         Back Pain            Duration:      HAS CHRONIC LOW BACK PAIN HAS BEEN TO PAIN CLINIC  WAS PUT ON TIZANIDINE  DOES REGULAR BACK EXERCISES  Takes up to 2  A day of tizanidine with tylenol MRI - showed mild to moderate degenerative changes        NEW PAIN UPPER BACK  Had it a few time over the last 2-3 years        3 months, worsening with yoga         Specific cause: none      Description (location/character/radiation): mid back        Intensity:  moderate, severe SHARP PAIN  SAME ON BOTH SIDE        Accompanying signs and symptoms (weakness/fever/urinary symptoms): none      History (similar episodes/surgery/injury):        Precipitating or alleviating factors: YOGA        Therapies tried and outcome: exercise, tylenol          FAMILY HISTORY:   Family History   Problem Relation Age of Onset     CANCER Mother      OSTEOPOROSIS Mother      CEREBROVASCULAR DISEASE Father      Alzheimer Disease Father      Genitourinary Problems Maternal Grandmother      HEART DISEASE Paternal Grandfather      OSTEOPOROSIS Sister      Arthritis Sister      Breast Cancer Paternal Grandmother      Colon Cancer No family hx of         EXAM:Vitals: /76  Pulse 68  Ht 5' 2\" (1.575 m)  Wt 153 lb (69.4 kg)  BMI 27.98 kg/m2  BMI= Body mass index is 27.98 kg/(m^2).    General appearance: Patient is alert and fully cooperative with history & exam.  No sign of distress is noted during the visit.     Psychiatric: Affect is pleasant & appropriate.  Patient appears motivated to improve " health.     Respiratory: Breathing is regular & unlabored while sitting.     HEENT: Hearing is intact to spoken word.  Speech is clear.  No gross evidence of visual impairment that would impact ambulation.     Dermatologic: Lateral aspect of L 3rd toe with localized rash.  No open wound.  No paronychia or evidence of soft tissue infection is noted.     Vascular: DP & PT pulses are intact & regular bilaterally.  No significant edema or varicosities noted.  CFT and skin temperature are normal to both lower extremities.     Neurologic: Lower extremity sensation is intact to light touch.  No evidence of weakness or contracture in the lower extremities.  No evidence of neuropathy.     Musculoskeletal: Patient is ambulatory without assistive device or brace.  No gross ankle deformity noted.  No foot or ankle joint effusion is noted.     ASSESSMENT: L tinea pedis     PLAN:  Reviewed patient's chart in epic.  Discussed condition and treatment options including pros and cons.    Lamisil AT otc cream advised.  Dry well between toes.  Roomier shoes advised.  F/u prn.  Handout given.       Alirio Aguilar, CHARLES, FACFAS

## 2018-04-24 NOTE — LETTER
4/24/2018         RE: Carmen Bonner  [address]  [facility] [address]        Dear Colleague,    Thank you for referring your patient, Carmen Bonner, to the [facility]. Please see a copy of my visit note below.    PATIENT HISTORY:  Carmen Bonner is a 72 year old female who presents to clinic for left 3rd toe rash.  4 day duration.  No injury.  She has tried steroid cream w/o resolution.  2-3/10 pain.  Worse with pressure.      Review of Systems:  Patient denies fever, chills, wound, stiffness, limping, numbness, weakness, blood in stool, chest pain with activity, calf pain when walking, shortness of breath with activity, chronic cough, easy bleeding/bruising, swelling of ankles, excessive thirst, fatigue, depression.  Patient admits to rash, heartburn, anxiety.     PAST MEDICAL HISTORY:   Past Medical History:   Diagnosis Date     Allergic rhinitis      Anxiety     on seroquel     Asthma      Cervicalgia      Chronic pain      Diabetes (H)     now diet controlled     DJD (degenerative joint disease) of lumbar spine     CHRONIC HAS SEEN PAIN CLINIC- put on tizanidine      Endometriosis      Finger pain      Finger swelling      Flank pain      GERD (gastroesophageal reflux disease)      Hematuria      Herpes      Hypertension      Hyponatremia      Insomnia     on seroquel     Insomnia      Knee pain      Purpura (H)     SAW DERM      Recurrent UTI      Skin cancer     basal cell      Stress incontinence      Upper back pain         PAST SURGICAL HISTORY:   Past Surgical History:   Procedure Laterality Date     CHOLECYSTECTOMY       HYSTERECTOMY TOTAL ABDOMINAL          MEDICATIONS:   Current Outpatient Prescriptions:      acetaminophen (ACETAMINOPHEN EXTRA STRENGTH) 500 MG tablet, Take 500-1,000 mg by mouth every 6 hours as needed for mild pain, Disp: , Rfl:      albuterol (PROAIR HFA) 108 (90 BASE) MCG/ACT Inhaler, Inhale 2 puffs into the lungs every 4 hours as needed for  shortness of breath / dyspnea or wheezing, Disp: 8.5 Inhaler, Rfl: 11     amLODIPine (NORVASC) 5 MG tablet, Take 1 tablet (5 mg) by mouth daily, Disp: 90 tablet, Rfl: 3     calcium carbonate (TUMS) 500 MG chewable tablet, Take 1 chew tab by mouth daily, Disp: , Rfl:      Cranberry 140-100-3 MG-MG-UNIT CAPS, Take 1 capsule by mouth daily, Disp: , Rfl:      lisinopril (PRINIVIL/ZESTRIL) 40 MG tablet, Take 1 tablet (40 mg) by mouth daily, Disp: 90 tablet, Rfl: 3     mometasone (NASONEX) 50 MCG/ACT nasal spray, Spray 2 sprays into both nostrils daily, Disp: 1 Box, Rfl: 3     montelukast (SINGULAIR) 10 MG tablet, Take 1 tablet (10 mg) by mouth At Bedtime, Disp: 90 tablet, Rfl: 3     QUEtiapine (SEROQUEL) 100 MG tablet, Take 1.5 tablets (150 mg) by mouth At Bedtime, Disp: 150 tablet, Rfl: 3     tiZANidine (ZANAFLEX) 2 MG tablet, TAKE 1 TABLET (2 MG) BY MOUTH 2 TIMES DAILY AS NEEDED FOR MUSCLE SPASMS, Disp: 60 tablet, Rfl: 1     triamcinolone (KENALOG) 0.1 % cream, Apply topically 2 times daily, Disp: 45 g, Rfl: 1     ValACYclovir HCl (VALTREX PO), Take by mouth as needed Herpes Outbreak, Disp: , Rfl:      vitamin  B complex with vitamin C (VITAMIN  B COMPLEX) TABS, Take 1 tablet by mouth daily, Disp: , Rfl:      ALLERGIES:    Allergies   Allergen Reactions     Omeprazole Diarrhea     Sulfa Drugs Itching        SOCIAL HISTORY:   Social History     Social History     Marital status: Single     Spouse name: N/A     Number of children: N/A     Years of education: N/A     Occupational History     Not on file.     Social History Main Topics     Smoking status: Former Smoker     Packs/day: 1.00     Years: 26.00     Smokeless tobacco: Never Used     Alcohol use No      Comment: Attends AA Meetings     Drug use: No     Sexual activity: No     Other Topics Concern     Parent/Sibling W/ Cabg, Mi Or Angioplasty Before 65f 55m? No     Social History Narrative    Health maintenance             ADVANCED DIRECTIVE PAMPHLET      "COLONOSCOPY     DEXA     ANNUAL WELLNESS VISIT    ASTHMA CONTROL TEST/ ACTION PLAN             LOOSE STOOLS       Duration: frequent looser stools / gurgling noises        No improvement with the fodmaps diet        Better off nexium/         Now on prevacid          Associated flank pain: None      Intensity:  moderate      Accompanying signs and symptoms:         Fever/Chills: no         Gas/Bloating: YES         Nausea/vomitting: no         Diarrhea: YES         Dysuria or Hematuria: no      Now regulating it with diet      Precipitating or alleviating factors:         Pain worse with eating/BM/urination: yes            Therapies tried and outcome: fodmap diet        LMP:  not applicable                         Back Pain            Duration:      HAS CHRONIC LOW BACK PAIN HAS BEEN TO PAIN CLINIC  WAS PUT ON TIZANIDINE  DOES REGULAR BACK EXERCISES  Takes up to 2  A day of tizanidine with tylenol MRI - showed mild to moderate degenerative changes        NEW PAIN UPPER BACK  Had it a few time over the last 2-3 years        3 months, worsening with yoga         Specific cause: none      Description (location/character/radiation): mid back        Intensity:  moderate, severe SHARP PAIN  SAME ON BOTH SIDE        Accompanying signs and symptoms (weakness/fever/urinary symptoms): none      History (similar episodes/surgery/injury):        Precipitating or alleviating factors: YOGA        Therapies tried and outcome: exercise, tylenol          FAMILY HISTORY:   Family History   Problem Relation Age of Onset     CANCER Mother      OSTEOPOROSIS Mother      CEREBROVASCULAR DISEASE Father      Alzheimer Disease Father      Genitourinary Problems Maternal Grandmother      HEART DISEASE Paternal Grandfather      OSTEOPOROSIS Sister      Arthritis Sister      Breast Cancer Paternal Grandmother      Colon Cancer No family hx of         EXAM:Vitals: /76  Pulse 68  Ht 5' 2\" (1.575 m)  Wt 153 lb (69.4 kg)  BMI 27.98 " kg/m2  BMI= Body mass index is 27.98 kg/(m^2).    General appearance: Patient is alert and fully cooperative with history & exam.  No sign of distress is noted during the visit.     Psychiatric: Affect is pleasant & appropriate.  Patient appears motivated to improve health.     Respiratory: Breathing is regular & unlabored while sitting.     HEENT: Hearing is intact to spoken word.  Speech is clear.  No gross evidence of visual impairment that would impact ambulation.     Dermatologic: Lateral aspect of L 3rd toe with localized rash.  No open wound.  No paronychia or evidence of soft tissue infection is noted.     Vascular: DP & PT pulses are intact & regular bilaterally.  No significant edema or varicosities noted.  CFT and skin temperature are normal to both lower extremities.     Neurologic: Lower extremity sensation is intact to light touch.  No evidence of weakness or contracture in the lower extremities.  No evidence of neuropathy.     Musculoskeletal: Patient is ambulatory without assistive device or brace.  No gross ankle deformity noted.  No foot or ankle joint effusion is noted.     ASSESSMENT: L tinea pedis     PLAN:  Reviewed patient's chart in epic.  Discussed condition and treatment options including pros and cons.    Lamisil AT otc cream advised.  Dry well between toes.  Roomier shoes advised.  F/u prn.  Handout given.       Alirio Aguilar DPM, FACFAS          Again, thank you for allowing me to participate in the care of your patient.        Sincerely,        Alirio Aguilar DPM

## 2018-04-24 NOTE — MR AVS SNAPSHOT
After Visit Summary   4/24/2018    Carmen Bonner    MRN: [id number]           Patient Information     Date Of Birth          1945        Visit Information        Provider Department      4/24/2018 3:45 PM Alirio Aguilar DPM [facility]        Today's Diagnoses     Tinea pedis of left foot    -  1      Care Instructions    Try Lamisil AT cream over the counter        Dr Aguilar [facility] Locations        Mondays Tuesdays   [facility]   [address] [facility] [address]   [place] [address] [place] [address]   ph: [phone] ph: [phone]   fax: [phone] fax: [phone]       Wednesdays Thursdays - Surgery   [facility] Scheduling - [place]: [phone]   [address] [place] [address] To Schedule an appointment please call:   ph: [phone]   fax: [phone]      Follow up as needed        Patient to follow up with Primary Care provider regarding elevated blood pressure.    ATHLETE'S FOOT (TINEA PEDIS)  It is a rash that is caused by a fungus (most commonly Dermatophytes) in the outer layer of skin on the foot or in the nails. It can occur between the toes or on the instep and heel areas of the feet. Fungus will grow in warm and moist environments. The fungus that causes athlete's foot is contagious and you can get it from touching someone who has it of walking around bare foot around swimming pools, gyms, saunas, communal baths and showers, fitness centers or locker rooms.     SYMPTOMS  The symptoms of athlete's foot are numerous and include; itching, burning or stinging between the toes or on the soles of the feet, blisters, cracked and peeling skin, excessive dryness or excessive scaling on the bottom or sides of the feet, redness and softness with breaking down of the skin, especially between the toes, foot odor and thick, crumbly nails. Older males  or people who live in warm humid environments are more prone to get it but it can develop at any age.    TREATMENT OPTIONS  Typically it can be treated with antifungal creams, lotions, ointments, sprays, or gels.  Many are available over the counter, some are prescription. It is important that you use them long enough, typically 4 weeks, to clear the rash. If an infection is particularly bad, your provider may prescribe oral medication. It is important that you follow the directions for any medication carefully to prevent recurrence of the rash.    HOME TREATMENT  1.  Keep feet clean and dry  2.  Dry between your toes any time your feet become wet  3.  Wear socks that are made of cotton, wool, or fibers designed to wick moisture away  from skin. Nylon, lycra, and spandex tend to trap moisture.  4.  If you have blistering, you may soak your feet in Burow's solution (aluminum acetate is active ingredient. Domeboro is a brand sold at [employer]). This is available over the counter.  5.  Treat shows with antifungal powder  6.  Tea Tree oil may help reduce symptoms but does not cure the rash.    PREVENTION  1.  Change socks or stockings regularly.  2.  Use talcum powder on your feet if they sweat a lot.  3.  Do not borrow shoes.  4.  Let shoes dry out for 24 hours before wearing them again.  5.  Treat shoes with fungal powder  6.  Wear shoes that are well ventilated such as leather shoes or sandals.  Avoid shoes  made of synthetic materials such as vinyl or rubber.  7.  Wear water proof sandals when you are in public areas such as locker rooms, pools, communal baths, showers, saunas, and fitness centers.    FYI: Call or seek medical attention IMMEDIATELY if:  - You develop a fever over 100.4F for more than 24 hrs.  - There is a discharge of pus from infected areas.  - Red streaks develop from the affected areas  - Increase in redness, warmth, pain, swelling, or tenderness in the affected areas.            Follow-ups after  "your visit        Follow-up notes from your care team     Return if symptoms worsen or fail to improve.      Your next 10 appointments already scheduled     Apr 30, 2018  1:30 PM CDT   Office Visit with Maribell Castaneda MD   [facility]    [address] [facility] [id number] [phone]           Bring a current list of meds and any records pertaining to this visit. For Physicals, please bring immunization records and any forms needing to be filled out. Please arrive 10 minutes early to complete paperwork.              Who to contact     If you have questions or need follow up information about today's clinic visit or your schedule please contact [facility] directly at [phone].  Normal or non-critical lab and imaging results will be communicated to you by MyChart, letter or phone within 4 business days after the clinic has received the results. If you do not hear from us within 7 days, please contact the clinic through [employer] or phone. If you have a critical or abnormal lab result, we will notify you by phone as soon as possible.  Submit refill requests through Make My [employer] or call your pharmacy and they will forward the refill request to us. Please allow 3 business days for your refill to be completed.          Additional Information About Your Visit        [employer] Information     Make My plate lets you send messages to your doctor, view your test results, renew your prescriptions, schedule appointments and more. To sign up, go to [email or web address] . Click on \"Log in\" on the left side of the screen, which will take you to the Welcome page. Then click on \"Sign up Now\" on the right side of the page.     You will be asked to enter the access code listed below, as well as some personal information. Please follow the directions to create your username and password.     Your access code is: [email or web address]  Expires: 7/23/2018  3:49 PM     Your access code will " " in 90 days. If you need help or a new code, please call your [facility] clinic or [phone].        Care EveryWhere ID     This is your Care EveryWhere ID. This could be used by other organizations to access your [facility] medical records  [id number]        Your Vitals Were     Pulse Height BMI (Body Mass Index)             68 5' 2\" (1.575 m) 27.98 kg/m2          Blood Pressure from Last 3 Encounters:   18 148/76   17 139/70   17 149/69    Weight from Last 3 Encounters:   18 153 lb (69.4 kg)   17 161 lb (73 kg)   17 146 lb 3.2 oz (66.3 kg)              Today, you had the following     No orders found for display       Primary Care Provider Office Phone # Fax #    Maribell GARCIA MD Tonya [phone] [address] [facility] [address]  [facility] [address]        Equal Access to Services     [facility]: Hadii aad ku hadasho Somary alice, waaxda luqadaha, qaybta kaalmada adeegyada, waxay idiin hayharrisonn patito calhoun . So [facility] [phone].    ATENCIÓN: Si habla español, tiene a lantigua disposición servicios gratuitos de asistencia lingüística. Llame al [phone].    We comply with applicable federal civil rights laws and [place] laws. We do not discriminate on the basis of race, color, national origin, age, disability, sex, sexual orientation, or gender identity.            Thank you!     Thank you for choosing [facility]  for your care. Our goal is always to provide you with excellent care. Hearing back from our patients is one way we can continue to improve our services. Please take a few minutes to complete the written survey that you may receive in the mail after your visit with us. Thank you!             Your Updated Medication List - Protect others around you: Learn how to safely use, store and throw away your medicines at www.disposemymeds.org.          This list is accurate as of 18  3:49 PM.  Always use your most recent med " list.                   Brand Name Dispense Instructions for use Diagnosis    ACETAMINOPHEN EXTRA STRENGTH 500 MG tablet   Generic drug:  acetaminophen      Take 500-1,000 mg by mouth every 6 hours as needed for mild pain        albuterol 108 (90 Base) MCG/ACT Inhaler    PROAIR HFA    8.5 Inhaler    Inhale 2 puffs into the lungs every 4 hours as needed for shortness of breath / dyspnea or wheezing    Intermittent asthma, uncomplicated       amLODIPine 5 MG tablet    NORVASC    90 tablet    Take 1 tablet (5 mg) by mouth daily    Benign essential hypertension       calcium carbonate 500 MG chewable tablet    TUMS     Take 1 chew tab by mouth daily        Cranberry 140-100-3 MG-MG-UNIT Caps      Take 1 capsule by mouth daily        lisinopril 40 MG tablet    PRINIVIL/ZESTRIL    90 tablet    Take 1 tablet (40 mg) by mouth daily    Benign essential hypertension       mometasone 50 MCG/ACT spray    NASONEX    1 Box    Spray 2 sprays into both nostrils daily    Allergic rhinitis due to pollen       montelukast 10 MG tablet    SINGULAIR    90 tablet    Take 1 tablet (10 mg) by mouth At Bedtime    Intermittent asthma, uncomplicated       QUEtiapine 100 MG tablet    SEROquel    150 tablet    Take 1.5 tablets (150 mg) by mouth At Bedtime    Primary insomnia       tiZANidine 2 MG tablet    ZANAFLEX    60 tablet    TAKE 1 TABLET (2 MG) BY MOUTH 2 TIMES DAILY AS NEEDED FOR MUSCLE SPASMS    Upper back pain       triamcinolone 0.1 % cream    KENALOG    45 g    Apply topically 2 times daily        VALTREX PO      Take by mouth as needed Herpes Outbreak        vitamin B complex with vitamin C Tabs tablet      Take 1 tablet by mouth daily

## 2018-04-30 ENCOUNTER — OFFICE VISIT (OUTPATIENT)
Dept: FAMILY MEDICINE | Facility: CLINIC | Age: 73
End: 2018-04-30
Payer: MEDICARE

## 2018-04-30 VITALS
TEMPERATURE: 98.4 F | OXYGEN SATURATION: 97 % | BODY MASS INDEX: 28.16 KG/M2 | WEIGHT: 153 LBS | DIASTOLIC BLOOD PRESSURE: 68 MMHG | HEIGHT: 62 IN | HEART RATE: 68 BPM | SYSTOLIC BLOOD PRESSURE: 136 MMHG

## 2018-04-30 DIAGNOSIS — B00.9 RECURRENT HERPES SIMPLEX: ICD-10-CM

## 2018-04-30 DIAGNOSIS — I10 HTN, GOAL BELOW 140/90: Chronic | ICD-10-CM

## 2018-04-30 DIAGNOSIS — B35.3 TINEA PEDIS OF BOTH FEET: Primary | ICD-10-CM

## 2018-04-30 DIAGNOSIS — E78.5 HYPERLIPIDEMIA, UNSPECIFIED HYPERLIPIDEMIA TYPE: ICD-10-CM

## 2018-04-30 DIAGNOSIS — K21.9 GASTROESOPHAGEAL REFLUX DISEASE WITHOUT ESOPHAGITIS: Chronic | ICD-10-CM

## 2018-04-30 DIAGNOSIS — Z13.29 SCREENING FOR THYROID DISORDER: ICD-10-CM

## 2018-04-30 DIAGNOSIS — Z12.11 SCREEN FOR COLON CANCER: ICD-10-CM

## 2018-04-30 PROCEDURE — 99214 OFFICE O/P EST MOD 30 MIN: CPT | Performed by: INTERNAL MEDICINE

## 2018-04-30 RX ORDER — VALACYCLOVIR HYDROCHLORIDE 1 G/1
1000 TABLET, FILM COATED ORAL PRN
Qty: 12 TABLET | Refills: 1 | Status: SHIPPED | OUTPATIENT
Start: 2018-04-30

## 2018-04-30 NOTE — PATIENT INSTRUCTIONS
Do FIT test at your earliest convenience  Follow up as needed for annual physical  You are due for mammogram.  Please call the following number to make appointment:  [phone]  Seek sooner medical attention if there is any worsening of symptoms or problems

## 2018-04-30 NOTE — PROGRESS NOTES
"  SUBJECTIVE:   Carmen Bonner is a 72 year old female who presents to clinic today for the following health issues:      Chief Complaint   Patient presents with     Ankle/Foot left     patient being treated for athletes foot from Podiatry, would like Dr Castaneda to take a look     Medication Request     traveling to [place] soon and would like refill of Valtrex and possibly an abx in case of UTI     Patient was seen by Dr. Aguilar, a podiatrist, on 04/24/18  She was diagnosed with tenia pedis    Problem list and histories reviewed & adjusted, as indicated.  Additional history: as documented    Medications and Labs reviewed in EPIC    Reviewed and updated as needed this visit by clinical staff  Tobacco  Allergies  Meds       Reviewed and updated as needed this visit by Provider         ROS:  Constitutional, HEENT, cardiovascular, pulmonary, GI, , musculoskeletal, neuro, skin, endocrine and psych systems are negative, except as otherwise noted.    POSITIVE for tinea pedis of left foot    This document serves as a record of the services and decisions personally performed and made by Maribell Castaneda MD. It was created on her behalf by Courtney Bauer, a trained medical scribe. The creation of this document is based on the provider's statements to the medical scribe.  Courtney Bauer 1:58 PM April 30, 2018    OBJECTIVE:     /68  Pulse 68  Temp 98.4  F (36.9  C) (Oral)  Ht 1.575 m (5' 2\")  Wt 69.4 kg (153 lb)  SpO2 97%  Breastfeeding? No  BMI 27.98 kg/m2  Body mass index is 27.98 kg/(m^2).    GENERAL: overweight, alert and no distress  SKIN: feet are improved, onychomycosis is improving  PSYCH: mentation appears normal, affect normal/bright    Diagnostic Test Results:  none     ASSESSMENT/PLAN:     Carmen was seen today for ankle/foot left and medication request.    Diagnoses and all orders for this visit:    Tinea pedis of both feet  Patient noticed changes in her feet about 1 week ago  Patient was " seen by Dr. Aguilar, a podiatrist, on 04/24/18  She was diagnosed with tenia pedis and treated  Upon examination, feet are grossly improving  Treatment seems to be working well for patient   We discussed diflucan but patient declined as she does not tolerate oral medication     Recurrent herpes simplex  -     valACYclovir (VALTREX) 1000 mg tablet; Take 1 tablet (1,000 mg) by mouth as needed Herpes Outbreak  Patient is traveling to [place] soon  She wanted a refill in case of flare up    Screen for colon cancer  -     Fecal colorectal cancer screen (FIT); Future  Opted out of colonoscopy  Will do FIT test at her earliest convenience    HTN, goal below 140/90  -     Comprehensive metabolic panel; Future  Doing well  Compliant with medication    Hyperlipidemia, unspecified hyperlipidemia type  -     Lipid panel reflex to direct LDL Fasting; Future  -     Comprehensive metabolic panel; Future    Screening for thyroid disorder  -     TSH with free T4 reflex; Future    Gastroesophageal reflux disease without esophagitis  -     ranitidine (ZANTAC) 150 MG tablet; Take 1 tablet (150 mg) by mouth 2 times daily  Doing well  Compliant with medication    Onychomycosis is improving.     Patient is due for mammogram and lab tests  She will schedule them at her earliest convenience    Patient Instructions   Do FIT test at your earliest convenience  Follow up as needed for annual physical  You are due for mammogram.  Please call the following number to make appointment:  [phone]  Seek sooner medical attention if there is any worsening of symptoms or problems    The information in this document, created by the medical scribe for me, accurately reflects the services I personally performed and the decisions made by me. I have reviewed and approved this document for accuracy prior to leaving the patient care area.  April 30, 2018 2:08 PM    Maribell Castaneda MD  [facility]

## 2018-04-30 NOTE — NURSING NOTE
"Chief Complaint   Patient presents with     Ankle/Foot left     patient being treated for athletes foot from Podiatry, would like Dr Castaneda to take a look     Medication Request     traveling to [place] soon and would like refill of Valtrex and possibly an abx in case of UTI       Initial /68  Pulse 68  Temp 98.4  F (36.9  C) (Oral)  Ht 5' 2\" (1.575 m)  Wt 153 lb (69.4 kg)  SpO2 97%  Breastfeeding? No  BMI 27.98 kg/m2 Estimated body mass index is 27.98 kg/(m^2) as calculated from the following:    Height as of this encounter: 5' 2\" (1.575 m).    Weight as of this encounter: 153 lb (69.4 kg).  Medication Reconciliation: complete     Randy Edward, SUSAN     "

## 2018-04-30 NOTE — MR AVS SNAPSHOT
After Visit Summary   4/30/2018    Carmen Bonner    MRN: [id number]           Patient Information     Date Of Birth          1945        Visit Information        Provider Department      4/30/2018 1:30 PM Maribell Castaneda MD [facility]        Today's Diagnoses     Tinea pedis of both feet    -  1    Recurrent herpes simplex        Screen for colon cancer        HTN, goal below 140/90        Hyperlipidemia, unspecified hyperlipidemia type        Screening for thyroid disorder        Gastroesophageal reflux disease without esophagitis          Care Instructions    Do FIT test at your earliest convenience  Follow up as needed for annual physical  You are due for mammogram.  Please call the following number to make appointment :  [phone]  Seek sooner medical attention if there is any worsening of symptoms or problems          Follow-ups after your visit        Future tests that were ordered for you today     Open Future Orders        Priority Expected Expires Ordered    TSH with free T4 reflex Routine 5/1/2018 12/28/2018 4/30/2018    Lipid panel reflex to direct LDL Fasting Routine 5/1/2018 12/28/2018 4/30/2018    Comprehensive metabolic panel Routine 5/1/2018 12/28/2018 4/30/2018    Fecal colorectal cancer screen (FIT) Routine 5/21/2018 7/23/2018 4/30/2018            Who to contact     If you have questions or need follow up information about today's clinic visit or your schedule please contact [facility] directly at [phone].  Normal or non-critical lab and imaging results will be communicated to you by MyChart, letter or phone within 4 business days after the clinic has received the results. If you do not hear from us within 7 days, please contact the clinic through MyChart or phone. If you have a critical or abnormal lab result, we will notify you by phone as soon as possible.  Submit refill requests through [place] or call your pharmacy and they will forward the  "refill request to us. Please allow 3 business days for your refill to be completed.          Additional Information About Your Visit        [employer] Information     [employer] lets you send messages to your doctor, view your test results, renew your prescriptions, schedule appointments and more. To sign up, go to [email or web address] . Click on \"Log in\" on the left side of the screen, which will take you to the Welcome page. Then click on \"Sign up Now\" on the right side of the page.     You will be asked to enter the access code listed below, as well as some personal information. Please follow the directions to create your username and password.     Your access code is: [email or web address]  Expires: 2018  3:49 PM     Your access code will  in 90 days. If you need help or a new code, please call your [facility] clinic or [phone].        Care EveryWhere ID     This is your Care EveryWhere ID. This could be used by other organizations to access your [facility] medical records  [id number]        Your Vitals Were     Pulse Temperature Height Pulse Oximetry Breastfeeding? BMI (Body Mass Index)    68 98.4  F (36.9  C) (Oral) 5' 2\" (1.575 m) 97% No 27.98 kg/m2       Blood Pressure from Last 3 Encounters:   18 136/68   18 148/76   17 139/70    Weight from Last 3 Encounters:   18 153 lb (69.4 kg)   18 153 lb (69.4 kg)   17 161 lb (73 kg)                 Today's Medication Changes          These changes are accurate as of 18  2:06 PM.  If you have any questions, ask your nurse or doctor.               Start taking these medicines.        Dose/Directions    ranitidine 150 MG tablet   Commonly known as:  ZANTAC   Used for:  Gastroesophageal reflux disease without esophagitis   Started by:  Maribell Castaneda MD        Dose:  150 mg   Take 1 tablet (150 mg) by mouth 2 times daily   Quantity:  60 tablet   Refills:  3         These medicines have changed or have updated prescriptions.  "       Dose/Directions    valACYclovir 1000 mg tablet   Commonly known as:  VALTREX   This may have changed:    - medication strength  - how much to take   Used for:  Recurrent herpes simplex   Changed by:  Maribell Castaneda MD        Dose:  1000 mg   Take 1 tablet (1,000 mg) by mouth as needed Herpes Outbreak   Quantity:  12 tablet   Refills:  1            Where to get your medicines      These medications were sent to [facility]/pharmacy #[id number] - [place], [place] - [address]  [address] [address]     Phone:  [phone]     ranitidine 150 MG tablet    valACYclovir 1000 mg tablet                Primary Care Provider Office Phone # Fax #    Maribell Castaneda [phone] [address] [facility] [address]  [facility] [address]        Equal Access to Services     OLEKSANDR [facility] Hospital[facility]SE : Hadii elvis grant hadnarcisoo Ami, waaxda luqadaha, qaybta kaalmada patitoyadesiree, hyun calhoun . So [facility] [phone].    ATENCIÓN: Si habla español, tiene a lantigua disposición servicios gratuitos de asistencia lingüística. Ll[facility] [phone].    We comply with applicable federal civil rights laws and [place] laws. We do not discriminate on the basis of race, color, national origin, age, disability, sex, sexual orientation, or gender identity.            Thank you!     Thank you for choosing [facility]  for your care. Our goal is always to provide you with excellent care. Hearing back from our patients is one way we can continue to improve our services. Please take a few minutes to complete the written survey that you may receive in the mail after your visit with us. Thank you!             Your Updated Medication List - Protect others around you: Learn how to safely use, store and throw away your medicines at www.disposemymeds.org.          This list is accurate as of 4/30/18  2:06 PM.  Always use your most recent med list.                   Brand Name Dispense Instructions for use  Diagnosis    ACETAMINOPHEN EXTRA STRENGTH 500 MG tablet   Generic drug:  acetaminophen      Take 500-1,000 mg by mouth every 6 hours as needed for mild pain        albuterol 108 (90 Base) MCG/ACT Inhaler    PROAIR HFA    8.5 Inhaler    Inhale 2 puffs into the lungs every 4 hours as needed for shortness of breath / dyspnea or wheezing    Intermittent asthma, uncomplicated       amLODIPine 5 MG tablet    NORVASC    90 tablet    Take 1 tablet (5 mg) by mouth daily    Benign essential hypertension       calcium carbonate 500 MG chewable tablet    TUMS     Take 1 chew tab by mouth daily        Cranberry 140-100-3 MG-MG-UNIT Caps      Take 1 capsule by mouth daily        lisinopril 40 MG tablet    PRINIVIL/ZESTRIL    90 tablet    Take 1 tablet (40 mg) by mouth daily    Benign essential hypertension       mometasone 50 MCG/ACT spray    NASONEX    1 Box    Spray 2 sprays into both nostrils daily    Allergic rhinitis due to pollen       montelukast 10 MG tablet    SINGULAIR    90 tablet    Take 1 tablet (10 mg) by mouth At Bedtime    Intermittent asthma, uncomplicated       QUEtiapine 100 MG tablet    SEROquel    150 tablet    Take 1.5 tablets (150 mg) by mouth At Bedtime    Primary insomnia       ranitidine 150 MG tablet    ZANTAC    60 tablet    Take 1 tablet (150 mg) by mouth 2 times daily    Gastroesophageal reflux disease without esophagitis       tiZANidine 2 MG tablet    ZANAFLEX    60 tablet    TAKE 1 TABLET (2 MG) BY MOUTH 2 TIMES DAILY AS NEEDED FOR MUSCLE SPASMS    Upper back pain       triamcinolone 0.1 % cream    KENALOG    45 g    Apply topically 2 times daily        valACYclovir 1000 mg tablet    VALTREX    12 tablet    Take 1 tablet (1,000 mg) by mouth as needed Herpes Outbreak    Recurrent herpes simplex       vitamin B complex with vitamin C Tabs tablet      Take 1 tablet by mouth daily

## 2018-06-22 DIAGNOSIS — Z12.11 SCREEN FOR COLON CANCER: ICD-10-CM

## 2018-06-22 PROCEDURE — 82274 ASSAY TEST FOR BLOOD FECAL: CPT | Performed by: INTERNAL MEDICINE

## 2018-06-22 NOTE — LETTER
[facility]  [address] [facility]  [address]  [place]  [address]  Tel: [phone]    June 26, 2018    Carmen Bonner  [address]  [facility] [address]        Dear Ms. Bonner,    This is to inform you regarding your test result.    Fecal colorectal cancer screen (FIT) is negative.    Sincerely,    Maribell Castaneda MD/TITO          Enclosure: Lab Results  Results for orders placed or performed in visit on 06/22/18   Fecal colorectal cancer screen (FIT)   Result Value Ref Range    Occult Blood Scn FIT Negative NEG^Negative

## 2018-06-25 LAB — HEMOCCULT STL QL IA: NEGATIVE

## 2018-06-26 NOTE — PROGRESS NOTES
Please notify patient by sending following letter with copy of test results      Mary Morales,    This is to inform you regarding your test result.    Fecal colorectal cancer screen (FIT) is negative.      Sincerely,      Dr.Nasima Tonya MD,FACP

## 2018-10-31 ENCOUNTER — HOSPITAL ENCOUNTER (EMERGENCY)
Facility: CLINIC | Age: 73
Discharge: HOME | End: 2018-11-01
Attending: EMERGENCY MEDICINE | Admitting: EMERGENCY MEDICINE
Payer: MEDICARE

## 2018-10-31 ENCOUNTER — APPOINTMENT (OUTPATIENT)
Dept: GENERAL RADIOLOGY | Facility: CLINIC | Age: 73
End: 2018-10-31
Attending: EMERGENCY MEDICINE
Payer: MEDICARE

## 2018-10-31 DIAGNOSIS — I47.10 SVT (SUPRAVENTRICULAR TACHYCARDIA) (H): ICD-10-CM

## 2018-10-31 DIAGNOSIS — I10 HYPERTENSION, UNSPECIFIED TYPE: ICD-10-CM

## 2018-10-31 LAB
ANION GAP SERPL CALCULATED.3IONS-SCNC: 7 MMOL/L (ref 3–14)
BASOPHILS # BLD AUTO: 0.1 10E9/L (ref 0–0.2)
BASOPHILS NFR BLD AUTO: 1.2 %
BUN SERPL-MCNC: 18 MG/DL (ref 7–30)
CALCIUM SERPL-MCNC: 9.3 MG/DL (ref 8.5–10.1)
CHLORIDE SERPL-SCNC: 103 MMOL/L (ref 94–109)
CO2 SERPL-SCNC: 26 MMOL/L (ref 20–32)
CREAT SERPL-MCNC: 0.71 MG/DL (ref 0.52–1.04)
DIFFERENTIAL METHOD BLD: NORMAL
EOSINOPHIL # BLD AUTO: 0.2 10E9/L (ref 0–0.7)
EOSINOPHIL NFR BLD AUTO: 3 %
ERYTHROCYTE [DISTWIDTH] IN BLOOD BY AUTOMATED COUNT: 13.9 % (ref 10–15)
GFR SERPL CREATININE-BSD FRML MDRD: 80 ML/MIN/1.7M2
GLUCOSE SERPL-MCNC: 118 MG/DL (ref 70–99)
HCT VFR BLD AUTO: 37.1 % (ref 35–47)
HGB BLD-MCNC: 12.2 G/DL (ref 11.7–15.7)
IMM GRANULOCYTES # BLD: 0 10E9/L (ref 0–0.4)
IMM GRANULOCYTES NFR BLD: 0.2 %
INTERPRETATION ECG - MUSE: NORMAL
LYMPHOCYTES # BLD AUTO: 2.8 10E9/L (ref 0.8–5.3)
LYMPHOCYTES NFR BLD AUTO: 45.3 %
MAGNESIUM SERPL-MCNC: 2.3 MG/DL (ref 1.6–2.3)
MCH RBC QN AUTO: 28.8 PG (ref 26.5–33)
MCHC RBC AUTO-ENTMCNC: 32.9 G/DL (ref 31.5–36.5)
MCV RBC AUTO: 88 FL (ref 78–100)
MONOCYTES # BLD AUTO: 0.5 10E9/L (ref 0–1.3)
MONOCYTES NFR BLD AUTO: 7.7 %
NEUTROPHILS # BLD AUTO: 2.6 10E9/L (ref 1.6–8.3)
NEUTROPHILS NFR BLD AUTO: 42.6 %
NRBC # BLD AUTO: 0 10*3/UL
NRBC BLD AUTO-RTO: 0 /100
PLATELET # BLD AUTO: 222 10E9/L (ref 150–450)
POTASSIUM SERPL-SCNC: 3.4 MMOL/L (ref 3.4–5.3)
RBC # BLD AUTO: 4.24 10E12/L (ref 3.8–5.2)
SODIUM SERPL-SCNC: 136 MMOL/L (ref 133–144)
TROPONIN I SERPL-MCNC: <0.015 UG/L (ref 0–0.04)
TSH SERPL DL<=0.005 MIU/L-ACNC: 1.38 MU/L (ref 0.4–4)
WBC # BLD AUTO: 6.1 10E9/L (ref 4–11)

## 2018-10-31 PROCEDURE — 71046 X-RAY EXAM CHEST 2 VIEWS: CPT

## 2018-10-31 PROCEDURE — 84443 ASSAY THYROID STIM HORMONE: CPT | Performed by: EMERGENCY MEDICINE

## 2018-10-31 PROCEDURE — 96360 HYDRATION IV INFUSION INIT: CPT

## 2018-10-31 PROCEDURE — 85025 COMPLETE CBC W/AUTO DIFF WBC: CPT | Performed by: EMERGENCY MEDICINE

## 2018-10-31 PROCEDURE — 83735 ASSAY OF MAGNESIUM: CPT | Performed by: EMERGENCY MEDICINE

## 2018-10-31 PROCEDURE — 99285 EMERGENCY DEPT VISIT HI MDM: CPT | Mod: 25

## 2018-10-31 PROCEDURE — 84484 ASSAY OF TROPONIN QUANT: CPT | Performed by: EMERGENCY MEDICINE

## 2018-10-31 PROCEDURE — 80048 BASIC METABOLIC PNL TOTAL CA: CPT | Performed by: EMERGENCY MEDICINE

## 2018-10-31 PROCEDURE — 93005 ELECTROCARDIOGRAM TRACING: CPT

## 2018-10-31 ASSESSMENT — ENCOUNTER SYMPTOMS
SORE THROAT: 0
RHINORRHEA: 0
COUGH: 0

## 2018-10-31 NOTE — ED AVS SNAPSHOT
Emergency Department    [address] [place] [facility] [address]    Phone:  [phone]    Fax:  [phone]                                       Carmen Bonner   MRN: [id number]    Department:   Emergency Department   Date of Visit:  10/31/2018           After Visit Summary Signature Page     I have received my discharge instructions, and my questions have been answered. I have discussed any challenges I see with this plan with the nurse or doctor.    ..........................................................................................................................................  Patient/Patient Representative Signature      ..........................................................................................................................................  Patient Representative Print Name and Relationship to Patient    ..................................................               ................................................  Date                                   Time    ..........................................................................................................................................  Reviewed by Signature/Title    ...................................................              ..............................................  Date                                               Time          22EPIC Rev 08/18

## 2018-10-31 NOTE — ED AVS SNAPSHOT
Emergency Department    [address] [facility] [address]    Phone:  [phone]    Fax:  [phone]                                       Carmen Bonner   MRN: [id number]    Department:   Emergency Department   Date of Visit:  10/31/2018           Patient Information     Date Of Birth          1945        Your diagnoses for this visit were:     SVT (supraventricular tachycardia) (H)     Hypertension, unspecified type        You were seen by Luís Turner MD.      Follow-up Information     Follow up with  Emergency Department.    Specialty:  EMERGENCY MEDICINE    Why:  As needed    Contact information:    [address] [facility] [address] [phone]        Follow up with Maribell Castaneda MD In 2 days.    Specialty:  Internal Medicine    Contact information:    [address] [address] [phone]          Discharge Instructions       Take home medications as previously prescribed.    Please follow-up in cardiology clinic.  You will be called to schedule a stress test.    Please return to the emergency department as needed for new or worsening symptoms including recurrence of the symptoms he presented with, fainting, chest pain, shortness of breath, rapid heart rate, any other concerning symptoms.        Discharge References/Attachments     SUPRAVENTRICULAR TACHYCARDIA (SVT), TREATMENT FOR  (ENGLISH)    SUPRAVENTRICULAR TACHYCARDIA (SVT), UNDERSTANDING  (ENGLISH)    VALSALVA MANEUVER (ENGLISH)      Your next 10 appointments already scheduled     Nov 02, 2018  2:00 PM CDT   Office Visit with Maribell Castaneda MD   [facility]    [address] [facility] [address] [phone]           Bring a current list of meds and any records pertaining to this visit. For Physicals, please bring immunization records and any forms needing to be filled out. Please arrive 10 minutes early to  complete paperwork.              24 Hour Appointment Hotline       To make an appointment at any [facility] (formerly [facility]), call [phone] ([phone]). If you don't have a family doctor or clinic, we will help you find one. [facility] clinics are conveniently located to serve the needs of you and your family.          ED Discharge Orders     Dobutamine Stress Echocardiogram       Administration of IV contrast will be tailored to this examination per the appropriate written protocol listed in the Echocardiography department Protocol Book, or by the supervising Cardiologist. This may result in an order change.    Use of contrast is at the discretion of the supervising Cardiologist.            Follow-Up with Cardiologist                    Review of your medicines      Our records show that you are taking the medicines listed below. If these are incorrect, please call your family doctor or clinic.        Dose / Directions Last dose taken    ACETAMINOPHEN EXTRA STRENGTH 500 MG tablet   Dose:  500-1000 mg   Generic drug:  acetaminophen        Take 500-1,000 mg by mouth every 6 hours as needed for mild pain   Refills:  0        albuterol 108 (90 Base) MCG/ACT inhaler   Commonly known as:  PROAIR HFA   Dose:  2 puff   Quantity:  8.5 Inhaler        Inhale 2 puffs into the lungs every 4 hours as needed for shortness of breath / dyspnea or wheezing   Refills:  11        amLODIPine 5 MG tablet   Commonly known as:  NORVASC   Dose:  5 mg   Quantity:  90 tablet        Take 1 tablet (5 mg) by mouth daily   Refills:  3        calcium carbonate 500 MG chewable tablet   Commonly known as:  TUMS   Dose:  1 chew tab        Take 1 chew tab by mouth daily   Refills:  0        Cranberry 140-100-3 MG-MG-UNIT Caps   Dose:  1 capsule        Take 1 capsule by mouth daily   Refills:  0        lisinopril 40 MG tablet   Commonly known as:  PRINIVIL/ZESTRIL   Dose:  40 mg   Quantity:  90 tablet        Take 1 tablet (40 mg) by mouth daily    Refills:  3        mometasone 50 MCG/ACT spray   Commonly known as:  NASONEX   Dose:  2 spray   Quantity:  1 Box        Spray 2 sprays into both nostrils daily   Refills:  3        montelukast 10 MG tablet   Commonly known as:  SINGULAIR   Dose:  10 mg   Quantity:  90 tablet        Take 1 tablet (10 mg) by mouth At Bedtime   Refills:  3        QUEtiapine 100 MG tablet   Commonly known as:  SEROquel   Dose:  150 mg   Quantity:  150 tablet        Take 1.5 tablets (150 mg) by mouth At Bedtime   Refills:  3        ranitidine 150 MG tablet   Commonly known as:  ZANTAC   Dose:  150 mg   Quantity:  60 tablet        Take 1 tablet (150 mg) by mouth 2 times daily   Refills:  3        tiZANidine 2 MG tablet   Commonly known as:  ZANAFLEX   Dose:  4 mg   Quantity:  60 tablet        Take 2 tablets (4 mg) by mouth 2 times daily   Refills:  1        triamcinolone 0.1 % cream   Commonly known as:  KENALOG   Quantity:  45 g        Apply topically 2 times daily   Refills:  1        valACYclovir 1000 mg tablet   Commonly known as:  VALTREX   Dose:  1000 mg   Quantity:  12 tablet        Take 1 tablet (1,000 mg) by mouth as needed Herpes Outbreak   Refills:  1        vitamin B complex with vitamin C Tabs tablet   Dose:  1 tablet        Take 1 tablet by mouth daily   Refills:  0                Procedures and tests performed during your visit     Basic metabolic panel    CBC with platelets differential    EKG 12-lead, tracing only    Magnesium    TSH with free T4 reflex    Troponin I    XR Chest 2 Views      Orders Needing Specimen Collection     None      Pending Results     Date and Time Order Name Status Description    10/31/2018 2251 XR Chest 2 Views Preliminary             Pending Culture Results     No orders found for last 3 day(s).            Pending Results Instructions     If you had any lab results that were not finalized at the time of your Discharge, you can call the ED Lab Result RN at [phone]. You will be contacted  by this team for any positive Lab results or changes in treatment. The nurses are available 7 days a week from 10A to 6:30P.  You can leave a message 24 hours per day and they will return your call.        Test Results From Your Hospital Stay        10/31/2018 11:08 PM      Component Results     Component Value Ref Range & Units Status    WBC 6.1 4.0 - 11.0 10e9/L Final    RBC Count 4.24 3.8 - 5.2 10e12/L Final    Hemoglobin 12.2 11.7 - 15.7 g/dL Final    Hematocrit 37.1 35.0 - 47.0 % Final    MCV 88 78 - 100 fl Final    MCH 28.8 26.5 - 33.0 pg Final    MCHC 32.9 31.5 - 36.5 g/dL Final    RDW 13.9 10.0 - 15.0 % Final    Platelet Count 222 150 - 450 10e9/L Final    Diff Method Automated Method  Final    % Neutrophils 42.6 % Final    % Lymphocytes 45.3 % Final    % Monocytes 7.7 % Final    % Eosinophils 3.0 % Final    % Basophils 1.2 % Final    % Immature Granulocytes 0.2 % Final    Nucleated RBCs 0 0 /100 Final    Absolute Neutrophil 2.6 1.6 - 8.3 10e9/L Final    Absolute Lymphocytes 2.8 0.8 - 5.3 10e9/L Final    Absolute Monocytes 0.5 0.0 - 1.3 10e9/L Final    Absolute Eosinophils 0.2 0.0 - 0.7 10e9/L Final    Absolute Basophils 0.1 0.0 - 0.2 10e9/L Final    Abs Immature Granulocytes 0.0 0 - 0.4 10e9/L Final    Absolute Nucleated RBC 0.0  Final         10/31/2018 11:26 PM      Component Results     Component Value Ref Range & Units Status    Sodium 136 133 - 144 mmol/L Final    Potassium 3.4 3.4 - 5.3 mmol/L Final    Chloride 103 94 - 109 mmol/L Final    Carbon Dioxide 26 20 - 32 mmol/L Final    Anion Gap 7 3 - 14 mmol/L Final    Glucose 118 (H) 70 - 99 mg/dL Final    Urea Nitrogen 18 7 - 30 mg/dL Final    Creatinine 0.71 0.52 - 1.04 mg/dL Final    GFR Estimate 80 >60 mL/min/1.7m2 Final    Non  GFR Calc    GFR Estimate If Black >90 >60 mL/min/1.7m2 Final    African American GFR Calc    Calcium 9.3 8.5 - 10.1 mg/dL Final         10/31/2018 11:32 PM      Component Results     Component Value Ref Range  & Units Status    Troponin I ES <0.015 0.000 - 0.045 ug/L Final    The 99th percentile for upper reference range is 0.045 ug/L.  Troponin values   in the range of 0.045 - 0.120 ug/L may be associated with risks of adverse   clinical events.           10/31/2018 11:36 PM      Component Results     Component Value Ref Range & Units Status    TSH 1.38 0.40 - 4.00 mU/L Final         10/31/2018 11:26 PM      Component Results     Component Value Ref Range & Units Status    Magnesium 2.3 1.6 - 2.3 mg/dL Final         10/31/2018 11:56 PM      Narrative     XR CHEST 2 VW  10/31/2018 11:40 PM     HISTORY: Chest pain.    COMPARISON: 1/27/2014.    FINDINGS: The heart size is normal. The thoracic aorta is calcified.  There is a small curvilinear scar at the left lung base. Nodular  density projecting over the right upper lobe may be an EKG electrode.  The lungs are otherwise clear. No pneumothorax or pleural effusion.  Degenerative disease and prominent kyphosis in the thoracic spine.        Impression     IMPRESSION: No acute abnormality.                Clinical Quality Measure: Blood Pressure Screening     Your blood pressure was checked while you were in the emergency department today. The last reading we obtained was  BP: 183/82 . Please read the guidelines below about what these numbers mean and what you should do about them.  If your systolic blood pressure (the top number) is less than 120 and your diastolic blood pressure (the bottom number) is less than 80, then your blood pressure is normal. There is nothing more that you need to do about it.  If your systolic blood pressure (the top number) is 120-139 or your diastolic blood pressure (the bottom number) is 80-89, your blood pressure may be higher than it should be. You should have your blood pressure rechecked within a year by a primary care provider.  If your systolic blood pressure (the top number) is 140 or greater or your diastolic blood pressure (the bottom  "number) is 90 or greater, you may have high blood pressure. High blood pressure is treatable, but if left untreated over time it can put you at risk for heart attack, stroke, or kidney failure. You should have your blood pressure rechecked by a primary care provider within the next 4 weeks.  If your provider in the emergency department today gave you specific instructions to follow-up with your doctor or provider even sooner than that, you should follow that instruction and not wait for up to 4 weeks for your follow-up visit.        Thank you for choosing [facility]       Thank you for choosing [facility] for your care. Our goal is always to provide you with excellent care. Hearing back from our patients is one way we can continue to improve our services. Please take a few minutes to complete the written survey that you may receive in the mail after you visit with us. Thank you!        [employer] Information     [employer] lets you send messages to your doctor, view your test results, renew your prescriptions, schedule appointments and more. To sign up, go to [email or web address] . Click on \"Log in\" on the left side of the screen, which will take you to the Welcome page. Then click on \"Sign up Now\" on the right side of the page.     You will be asked to enter the access code listed below, as well as some personal information. Please follow the directions to create your username and password.     Your access code is: [email or web address]  Expires: 2019 11:30 PM     Your access code will  in 90 days. If you need help or a new code, please call your [facility] clinic or [phone].        Care EveryWhere ID     This is your Care EveryWhere ID. This could be used by other organizations to access your [facility] medical records  [id number]        Equal Access to Services     MARINO KEEN : eddie Jacobs qaybta kaalmada adeegyada, waxay idiin hayaan adeeg kharash la'aan ah. So popeye " [phone].    ATENCIÓN: Si habla español, tiene a lantigua disposición servicios gratuitos de asistencia lingüística. Llame al [phone].    We comply with applicable federal civil rights laws and [place] laws. We do not discriminate on the basis of race, color, national origin, age, disability, sex, sexual orientation, or gender identity.            After Visit Summary       This is your record. Keep this with you and show to your community pharmacist(s) and doctor(s) at your next visit.

## 2018-11-01 VITALS
BODY MASS INDEX: 31.85 KG/M2 | RESPIRATION RATE: 12 BRPM | WEIGHT: 174.16 LBS | DIASTOLIC BLOOD PRESSURE: 82 MMHG | SYSTOLIC BLOOD PRESSURE: 183 MMHG | OXYGEN SATURATION: 97 % | TEMPERATURE: 97.3 F

## 2018-11-01 PROCEDURE — 25000128 H RX IP 250 OP 636: Performed by: EMERGENCY MEDICINE

## 2018-11-01 PROCEDURE — 96360 HYDRATION IV INFUSION INIT: CPT

## 2018-11-01 RX ADMIN — SODIUM CHLORIDE 500 ML: 9 INJECTION, SOLUTION INTRAVENOUS at 00:09

## 2018-11-01 NOTE — ED PROVIDER NOTES
History     Chief Complaint:    Chest Pain       HPI   Carmen Bonner is a 72 year old female with a history of anxiety, hypertension, hyperlipidemia, and diabetes who presents to the ED via EMS for evaluation of chest pain. The patient states that she has a sudden onset of chest pain with associated radiating into her jaw and left arm shortly prior to presenting. She subsequently called EMS. EMS states that she was initially tachycardic with a heart rate in the 180s and SVT on their monitor. About half way to the the ED, EMS reports that he heart rate spontaneously dropped to 90. The patient states that she feel fine on arrival with resolution of her symptoms. She denies any cough, rhinorrhea, sore throat, new medications, or history of SVT. Patient is hypertensive on arrival with an initial blood pressure of 216/116 and states that she took her blood pressure medications around 1930.     Allergies:  Omeprazole  Sulfa drugs     Medications:    Albuterol  Norvasc  Lisinopril  Singulair  Seroquel  Zantac  Valtrex     Past Medical History:    Anxiety  Asthma  Cervicalgia  Chronic pain  Diabetes  DJD  Endometriosis  GERD  Herpes  HTN  Hyperlipidemia  Insomnia  Purpura  Recurrent UTI  Skin cancer  Stress incontinence    Past Surgical History:    Cholecystectomy  Total abdominal hysterectomy    Family History:    Cancer  Osteoporosis  Cerebrovascular disease  Alzheimer disease  Arthritis    Social History:  Former smoker of 22 years, quit 23 years ago.  Negative for alcohol use.  Marital Status:  Single [1]     Review of Systems   HENT: Negative for rhinorrhea and sore throat.    Respiratory: Negative for cough.    Cardiovascular: Positive for chest pain.   All other systems reviewed and are negative.      Physical Exam     Patient Vitals for the past 24 hrs:   BP Temp Temp src Heart Rate Resp SpO2 Weight   11/01/18 0010 170/86 - - 77 (!) 32 97 % -   10/31/18 2245 (!) 216/116 97.3  F (36.3  C) Oral 95 - 97 % 79 kg (174  lb 2.6 oz)         Physical Exam  Constitutional: Well developed, nontox appearance  Head: Atraumatic.   Mouth/Throat: Oropharynx is clear and moist.   Neck:  no stridor  Eyes: no scleral icterus  Cardiovascular: RRR, 2+ bilat radial, DP pulses  Pulmonary/Chest: nml resp effort, Clear BS bilat  Abdominal: ND, +BS, soft, NT, no rebound or guarding   : no CVA tenderness bilat  Ext: Warm, well perfused, no edema  Neurological: A&O, symmetric facies, moves ext x4  Skin: Skin is warm and dry.   Psychiatric: Behavior is normal. Thought content normal.   Nursing note and vitals reviewed.    Emergency Department Course   ECG:  Indication: chest pain  Time: 2248  Vent. Rate 78 bpm. WV interval 152. QRS duration 76. QT/QTc 364/414. P-R-T axis 23 35 54.  Normal sinus rhythm. Nonspecific ST and T wave abnormality. Abnormal ECG. Baseline wander in I, II. No significant change compared to EKG dated 4/4/14. Read time: 2250      Imaging:  Radiographic findings were communicated with the patient who voiced understanding of the findings.  XR Chest 2 views:   No acute abnormality as per radiology.      Laboratory:  CBC: WBC: 6.1, HGB: 12.2, PLT: 222  BMP: Glucose 118 (H), o/w WNL (Creatinine: 0.71)    2257 Troponin: <0.015  TSH: 0.38  Magnesium: 2.3      Interventions:  0009  mLs IV      Emergency Department Course:  Nursing notes and vitals reviewed. (2245) I performed an exam of the patient as documented above.     EKG obtained in the ED, see results above.     IV inserted. Medicine administered as documented above. Blood drawn. This was sent to the lab for further testing, results above.    The patient was sent for a chest XR while in the emergency department, findings above.     I rechecked the patient and discussed the results of her workup thus far.     Findings and plan explained to the Patient. Patient discharged home with instructions regarding supportive care, medications, and reasons to return. The importance of  close follow-up was reviewed.     I personally reviewed the laboratory results with the Patient and answered all related questions prior to discharge.     Impression & Plan    Medical Decision Making:  Carmen Bonner is a 72 year old female who presents with tachycardia and palpitations, which resolved en route via EMS. DDx includes SVT, sinus tachycardia, re-entrant tachycardia for the narrow complex regular tachycardia.  EMS EKG consistent with SVT.  EKG in ED shows return to sinus rhythm and unchanged from previous EKG demonstrating sinus rhythm in system.  Pt reports feeling back to normal after returning to sinus rhythm shortly prior to arrival and currently has no complaints.  She is hypertensive which improved during ED course without intervention.  Labs and imaging ordered to screen for precipitating factors which returned unremarkable.  Although SVT is not generally an ischemic rhythm, given the pt's assoc chest pressure, shortness of breath and radiation to left arm (which could def be rate related) in the setting of cardiac risk factors, I think it would be reasonable for the pt to receive in outpt stress test.  Doubt PE.  No further episodes of tachycardia in ED.  At this point I feel the pt is safe for discharge.  Recommendations given regarding follow up with primary care doctor in 2 days as scheduled and return to the emergency department as needed for new or worsening symptoms.  Counseled on all results, diagnosis and disposition.  Pt understanding and agreeable to plan. Patient discharged in stable condition.      Diagnosis:    ICD-10-CM    1. SVT (supraventricular tachycardia) (H) I47.1    2. Hypertension, unspecified type I10        Disposition:  discharged to home    Discharge Medications:  New Prescriptions    No medications on file     Scribe Disclosure:  Timo GUERRERO, am serving as a scribe on 10/31/2018 at 10:45 PM to personally document services performed by Luís Turner MD  based on my observations and the provider's statements to me.          Timo Samuel  10/31/2018    EMERGENCY DEPARTMENT       Luís Turner MD  11/01/18 0291

## 2018-11-01 NOTE — DISCHARGE INSTRUCTIONS
Take home medications as previously prescribed.    Please follow-up in cardiology clinic.  You will be called to schedule a stress test.    Please return to the emergency department as needed for new or worsening symptoms including recurrence of the symptoms he presented with, fainting, chest pain, shortness of breath, rapid heart rate, any other concerning symptoms.

## 2018-11-02 ENCOUNTER — OFFICE VISIT (OUTPATIENT)
Dept: FAMILY MEDICINE | Facility: CLINIC | Age: 73
End: 2018-11-02
Payer: MEDICARE

## 2018-11-02 VITALS
SYSTOLIC BLOOD PRESSURE: 160 MMHG | TEMPERATURE: 98.4 F | WEIGHT: 174 LBS | HEART RATE: 67 BPM | OXYGEN SATURATION: 97 % | DIASTOLIC BLOOD PRESSURE: 70 MMHG | BODY MASS INDEX: 32.02 KG/M2 | HEIGHT: 62 IN

## 2018-11-02 DIAGNOSIS — J45.20 INTERMITTENT ASTHMA, UNCOMPLICATED: ICD-10-CM

## 2018-11-02 DIAGNOSIS — I47.10 PAROXYSMAL SUPRAVENTRICULAR TACHYCARDIA (H): Primary | ICD-10-CM

## 2018-11-02 DIAGNOSIS — R10.13 EPIGASTRIC PAIN: ICD-10-CM

## 2018-11-02 DIAGNOSIS — M54.9 UPPER BACK PAIN: ICD-10-CM

## 2018-11-02 DIAGNOSIS — I10 BENIGN ESSENTIAL HYPERTENSION: ICD-10-CM

## 2018-11-02 DIAGNOSIS — R10.13 DYSPEPSIA: ICD-10-CM

## 2018-11-02 DIAGNOSIS — E78.5 HYPERLIPIDEMIA, UNSPECIFIED HYPERLIPIDEMIA TYPE: ICD-10-CM

## 2018-11-02 PROCEDURE — 99214 OFFICE O/P EST MOD 30 MIN: CPT | Performed by: INTERNAL MEDICINE

## 2018-11-02 RX ORDER — LISINOPRIL 40 MG/1
40 TABLET ORAL DAILY
Qty: 90 TABLET | Refills: 3 | Status: SHIPPED | OUTPATIENT
Start: 2018-11-02 | End: 2019-08-29

## 2018-11-02 RX ORDER — METOPROLOL SUCCINATE 25 MG/1
25 TABLET, EXTENDED RELEASE ORAL DAILY
Qty: 30 TABLET | Refills: 3 | Status: SHIPPED | OUTPATIENT
Start: 2018-11-02 | End: 2019-01-21

## 2018-11-02 RX ORDER — TIZANIDINE 2 MG/1
4 TABLET ORAL 2 TIMES DAILY
Qty: 60 TABLET | Refills: 1 | Status: SHIPPED | OUTPATIENT
Start: 2018-11-02 | End: 2018-12-22

## 2018-11-02 RX ORDER — OMEPRAZOLE 40 MG/1
40 CAPSULE, DELAYED RELEASE ORAL DAILY
Qty: 30 CAPSULE | Refills: 1 | Status: SHIPPED | OUTPATIENT
Start: 2018-11-02 | End: 2018-12-14

## 2018-11-02 RX ORDER — MONTELUKAST SODIUM 10 MG/1
10 TABLET ORAL AT BEDTIME
Qty: 90 TABLET | Refills: 3 | Status: SHIPPED | OUTPATIENT
Start: 2018-11-02 | End: 2019-08-29

## 2018-11-02 RX ORDER — ATORVASTATIN CALCIUM 20 MG/1
20 TABLET, FILM COATED ORAL DAILY
Qty: 90 TABLET | Refills: 1 | Status: SHIPPED | OUTPATIENT
Start: 2018-11-02 | End: 2018-12-14

## 2018-11-02 NOTE — LETTER
My Asthma Action Plan  Name: Carmen Bonner   YOB: 1945  Date: 11/2/2018   My doctor: Maribell Castaneda MD   My clinic: [facility]        My Control Medicine:   My Rescue Medicine: albuterol   My Asthma Severity: intermittent  Avoid your asthma triggers: weather and URI  None            GREEN ZONE   Good Control    I feel good    No cough or wheeze    Can work, sleep and play without asthma symptoms       Take your asthma control medicine every day.     1. If exercise triggers your asthma, take your rescue medication    15 minutes before exercise or sports, and    During exercise if you have asthma symptoms  2. Spacer to use with inhaler: If you have a spacer, make sure to use it with your inhaler             YELLOW ZONE Getting Worse  I have ANY of these:    I do not feel good    Cough or wheeze    Chest feels tight    Wake up at night   1. Keep taking your Green Zone medications  2. Start taking your rescue medicine:    every 20 minutes for up to 1 hour. Then every 4 hours for 24-48 hours.  3. If you stay in the Yellow Zone for more than 12-24 hours, contact your doctor.  4. If you do not return to the Green Zone in 12-24 hours or you get worse, start taking your oral steroid medicine if prescribed by your provider.           RED ZONE Medical Alert - Get Help  I have ANY of these:    I feel awful    Medicine is not helping    Breathing getting harder    Trouble walking or talking    Nose opens wide to breathe       1. Take your rescue medicine NOW  2. If your provider has prescribed an oral steroid medicine, start taking it NOW  3. Call your doctor NOW  4. If you are still in the Red Zone after 20 minutes and you have not reached your doctor:    Take your rescue medicine again and    Call 911 or go to the emergency room right away    See your regular doctor within 2 weeks of an Emergency Room or Urgent Care visit for follow-up treatment.          Annual Reminders:  Meet with Asthma  Educator,  Flu Shot in the Fall, consider Pneumonia Vaccination for patients with asthma (aged 19 and older).    Pharmacy: [id number] - [place], [place] - [address] [facility]                      Asthma Triggers  How To Control Things That Make Your Asthma Worse    Triggers are things that make your asthma worse.  Look at the list below to help you find your triggers and what you can do about them.  You can help prevent asthma flare-ups by staying away from your triggers.      Trigger                                                          What you can do   Cigarette Smoke  Tobacco smoke can make asthma worse. Do not allow smoking in your home, car or around you.  Be sure no one smokes at a child s day care or school.  If you smoke, ask your health care provider for ways to help you quit.  Ask family members to quit too.  Ask your health care provider for a referral to Quit Plan to help you quit smoking, or call 5-882-717-PLAN.     Colds, Flu, Bronchitis  These are common triggers of asthma. Wash your hands often.  Don t touch your eyes, nose or mouth.  Get a flu shot every year.     Dust Mites  These are tiny bugs that live in cloth or carpet. They are too small to see. Wash sheets and blankets in hot water every week.   Encase pillows and mattress in dust mite proof covers.  Avoid having carpet if you can. If you have carpet, vacuum weekly.   Use a dust mask and HEPA vacuum.   Pollen and Outdoor Mold  Some people are allergic to trees, grass, or weed pollen, or molds. Try to keep your windows closed.  Limit time out doors when pollen count is high.   Ask you health care provider about taking medicine during allergy season.     Animal Dander  Some people are allergic to skin flakes, urine or saliva from pets with fur or feathers. Keep pets with fur or feathers out of your home.    If you can t keep the pet outdoors, then keep the pet out of your bedroom.  Keep the bedroom door closed.  Keep pets off cloth furniture  and away from stuffed toys.     Mice, Rats, and Cockroaches  Some people are allergic to the waste from these pests.   Cover food and garbage.  Clean up spills and food crumbs.  Store grease in the refrigerator.   Keep food out of the bedroom.   Indoor Mold  This can be a trigger if your home has high moisture. Fix leaking faucets, pipes, or other sources of water.   Clean moldy surfaces.  Dehumidify basement if it is damp and smelly.   Smoke, Strong Odors, and Sprays  These can reduce air quality. Stay away from strong odors and sprays, such as perfume, powder, hair spray, paints, smoke incense, paint, cleaning products, candles and new carpet.   Exercise or Sports  Some people with asthma have this trigger. Be active!  Ask your doctor about taking medicine before sports or exercise to prevent symptoms.    Warm up for 5-10 minutes before and after sports or exercise.     Other Triggers of Asthma  Cold air:  Cover your nose and mouth with a scarf.  Sometimes laughing or crying can be a trigger.  Some medicines and food can trigger asthma.

## 2018-11-02 NOTE — MR AVS SNAPSHOT
After Visit Summary   11/2/2018    Carmen Bonner    MRN: [id number]           Patient Information     Date Of Birth          1945        Visit Information        Provider Department      11/2/2018 2:00 PM Maribell Castaneda MD [facility]        Today's Diagnoses     Paroxysmal supraventricular tachycardia (H)    -  1    Dyspepsia        Epigastric pain        Upper back pain        Hyperlipidemia, unspecified hyperlipidemia type        Benign essential hypertension        Intermittent asthma, uncomplicated          Care Instructions    Schedule an appointment for stress test at your earliest convenience  If symptoms do not improve, will discuss ordering endoscopy     Take 40 mg omeprazole 30-60 minutes before meal   try eat one yogurt ( with active cultures) daily or oral probiotic otc such as Culturelle or Align to help restore your natural gut bacteria to hopefully prevent yeast infections and/or diarrhea sometimes assoc. with this antibiotic.      Take 20 mg Lipitor once daily( start after your digestive symptoms improve)    Take 25 mg metoprolol once daily  Monitor your blood pressure once a week at home.  Bring those readings on your next visit.  Notify us if your blood pressure readings consistently stays greater than 140/90.  Hold metoprolol day (24 hr) before stress test    Follow up in one month  Seek sooner medical attention if there is any worsening of symptoms or problems            Follow-ups after your visit        Follow-up notes from your care team     Return in about 1 month (around 12/2/2018) for hypertension, medication follow up.      Who to contact     If you have questions or need follow up information about today's clinic visit or your schedule please contact [facility] directly at [phone].  Normal or non-critical lab and imaging results will be communicated to you by MyChart, letter or phone within 4 business days after the clinic has received  "the results. If you do not hear from us within 7 days, please contact the clinic through [employer] or phone. If you have a critical or abnormal lab result, we will notify you by phone as soon as possible.  Submit refill requests through [employer] or call your pharmacy and they will forward the refill request to us. Please allow 3 business days for your refill to be completed.          Additional Information About Your Visit        [employer] Information     [employer] lets you send messages to your doctor, view your test results, renew your prescriptions, schedule appointments and more. To sign up, go to [email or web address] . Click on \"Log in\" on the left side of the screen, which will take you to the Welcome page. Then click on \"Sign up Now\" on the right side of the page.     You will be asked to enter the access code listed below, as well as some personal information. Please follow the directions to create your username and password.     Your access code is: [email or web address]  Expires: 2019 11:30 PM     Your access code will  in 90 days. If you need help or a new code, please call your [facility] clinic or [phone].        Care EveryWhere ID     This is your Care EveryWhere ID. This could be used by other organizations to access your [facility] medical records  [id number]        Your Vitals Were     Pulse Temperature Height Pulse Oximetry Breastfeeding? BMI (Body Mass Index)    67 98.4  F (36.9  C) (Oral) 5' 2\" (1.575 m) 97% No 31.83 kg/m2       Blood Pressure from Last 3 Encounters:   18 160/70   18 183/82   18 136/68    Weight from Last 3 Encounters:   18 174 lb (78.9 kg)   10/31/18 174 lb 2.6 oz (79 kg)   18 153 lb (69.4 kg)              Today, you had the following     No orders found for display         Today's Medication Changes          These changes are accurate as of 18  2:29 PM.  If you have any questions, ask your nurse or doctor.               Start taking these " medicines.        Dose/Directions    atorvastatin 20 MG tablet   Commonly known as:  LIPITOR   Used for:  Hyperlipidemia, unspecified hyperlipidemia type   Started by:  Maribell Castaneda MD        Dose:  20 mg   Take 1 tablet (20 mg) by mouth daily   Quantity:  90 tablet   Refills:  1       metoprolol succinate 25 MG 24 hr tablet   Commonly known as:  TOPROL-XL   Used for:  Benign essential hypertension   Started by:  Maribell Castaneda MD        Dose:  25 mg   Take 1 tablet (25 mg) by mouth daily   Quantity:  30 tablet   Refills:  3       omeprazole 40 MG capsule   Commonly known as:  priLOSEC   Used for:  Dyspepsia, Epigastric pain   Started by:  Maribell Castaneda MD        Dose:  40 mg   Take 1 capsule (40 mg) by mouth daily Take 30-60 minutes before a meal.   Quantity:  30 capsule   Refills:  1         Stop taking these medicines if you haven't already. Please contact your care team if you have questions.     ranitidine 150 MG tablet   Commonly known as:  ZANTAC   Stopped by:  Maribell Castaneda MD                Where to get your medicines      These medications were sent to [facility]/pharmacy #[id number] - [place], [place] - [address]  [address] [address]     Phone:  [phone]     atorvastatin 20 MG tablet    lisinopril 40 MG tablet    metoprolol succinate 25 MG 24 hr tablet    montelukast 10 MG tablet    omeprazole 40 MG capsule    tiZANidine 2 MG tablet                Primary Care Provider Office Phone # Fax #    Maribell Castaneda [phone] [address] [facility] [address]  [facility] [address]        Equal Access to Services     [facility] Center[facility]: Hadii elvis ku hadasho Ami, waaxda luqadaha, qaybta kaalmada adeegyada, waxay maddi butt. So [facility] [place] [phone].    ATENCIÓN: Si habla español, tiene a lantigua disposición servicios gratuitos de asistencia lingüística. Jose C al [phone].    We comply with applicable federal civil rights laws and [place] laws. We  do not discriminate on the basis of race, color, national origin, age, disability, sex, sexual orientation, or gender identity.            Thank you!     Thank you for choosing [facility]  for your care. Our goal is always to provide you with excellent care. Hearing back from our patients is one way we can continue to improve our services. Please take a few minutes to complete the written survey that you may receive in the mail after your visit with us. Thank you!             Your Updated Medication List - Protect others around you: Learn how to safely use, store and throw away your medicines at www.disposemymeds.org.          This list is accurate as of 11/2/18  2:29 PM.  Always use your most recent med list.                   Brand Name Dispense Instructions for use Diagnosis    ACETAMINOPHEN EXTRA STRENGTH 500 MG tablet   Generic drug:  acetaminophen      Take 500-1,000 mg by mouth every 6 hours as needed for mild pain        albuterol 108 (90 Base) MCG/ACT inhaler    PROAIR HFA    8.5 Inhaler    Inhale 2 puffs into the lungs every 4 hours as needed for shortness of breath / dyspnea or wheezing    Intermittent asthma, uncomplicated       amLODIPine 5 MG tablet    NORVASC    90 tablet    Take 1 tablet (5 mg) by mouth daily    Benign essential hypertension       atorvastatin 20 MG tablet    LIPITOR    90 tablet    Take 1 tablet (20 mg) by mouth daily    Hyperlipidemia, unspecified hyperlipidemia type       calcium carbonate 500 MG chewable tablet    TUMS     Take 1 chew tab by mouth daily        Cranberry 140-100-3 MG-MG-UNIT Caps      Take 1 capsule by mouth daily        lisinopril 40 MG tablet    PRINIVIL/ZESTRIL    90 tablet    Take 1 tablet (40 mg) by mouth daily    Benign essential hypertension       metoprolol succinate 25 MG 24 hr tablet    TOPROL-XL    30 tablet    Take 1 tablet (25 mg) by mouth daily    Benign essential hypertension       mometasone 50 MCG/ACT spray    NASONEX    1 Box    Spray  2 sprays into both nostrils daily    Allergic rhinitis due to pollen       montelukast 10 MG tablet    SINGULAIR    90 tablet    Take 1 tablet (10 mg) by mouth At Bedtime    Intermittent asthma, uncomplicated       omeprazole 40 MG capsule    priLOSEC    30 capsule    Take 1 capsule (40 mg) by mouth daily Take 30-60 minutes before a meal.    Dyspepsia, Epigastric pain       QUEtiapine 100 MG tablet    SEROquel    150 tablet    Take 1.5 tablets (150 mg) by mouth At Bedtime    Primary insomnia       tiZANidine 2 MG tablet    ZANAFLEX    60 tablet    Take 2 tablets (4 mg) by mouth 2 times daily    Upper back pain       triamcinolone 0.1 % cream    KENALOG    45 g    Apply topically 2 times daily        valACYclovir 1000 mg tablet    VALTREX    12 tablet    Take 1 tablet (1,000 mg) by mouth as needed Herpes Outbreak    Recurrent herpes simplex       vitamin B complex with vitamin C Tabs tablet      Take 1 tablet by mouth daily
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
## 2018-11-02 NOTE — PATIENT INSTRUCTIONS
Schedule an appointment for stress test at your earliest convenience  If symptoms do not improve, will discuss ordering endoscopy     Take 40 mg omeprazole 30-60 minutes before meal   try eat one yogurt ( with active cultures) daily or oral probiotic otc such as Culturelle or Align to help restore your natural gut bacteria to hopefully prevent yeast infections and/or diarrhea sometimes assoc. with this antibiotic.      Take 20 mg Lipitor once daily( start after your digestive symptoms improve)    Take 25 mg metoprolol once daily  Monitor your blood pressure once a week at home.  Bring those readings on your next visit.  Notify us if your blood pressure readings consistently stays greater than 140/90.  Hold metoprolol day (24 hr) before stress test    Follow up in one month  Seek sooner medical attention if there is any worsening of symptoms or problems

## 2018-11-03 ASSESSMENT — ASTHMA QUESTIONNAIRES: ACT_TOTALSCORE: 25

## 2018-12-01 DIAGNOSIS — R10.13 EPIGASTRIC PAIN: ICD-10-CM

## 2018-12-01 DIAGNOSIS — R10.13 DYSPEPSIA: ICD-10-CM

## 2018-12-03 NOTE — TELEPHONE ENCOUNTER
"omeprazole (PRILOSEC) 40 MG capsule  Last Written Prescription Date:  11/2/18  Last Fill Quantity: 30,  # refills: 1   Last office visit: 11/2/2018 with prescribing provider:  brenda   Future Office Visit:   Next 5 appointments (look out 90 days)     Dec 14, 2018  3:00 PM CST   [facility]    [address] [id number] [phone]                       Requested Prescriptions   Pending Prescriptions Disp Refills     omeprazole (PRILOSEC) 40 MG DR capsule [Pharmacy Med Name: OMEPRAZOLE DR 40 MG CAPSULE] 30 capsule 1     Sig: TAKE 1 CAPSULE (40 MG) BY MOUTH DAILY TAKE 30-60 MINUTES BEFORE A MEAL.    PPI Protocol Passed    12/1/2018 10:59 AM       Passed - Not on Clopidogrel (unless Pantoprazole ordered)       Passed - No diagnosis of osteoporosis on record       Passed - Recent (12 mo) or future (30 days) visit within the authorizing provider's specialty    Patient had office visit in the last 12 months or has a visit in the next 30 days with authorizing provider or within the authorizing provider's specialty.  See \"Patient Info\" tab in inbasket, or \"Choose Columns\" in Meds & Orders section of the refill encounter.             Passed - Patient is age 18 or older       Passed - No active pregnacy on record       Passed - No positive pregnancy test in past 12 months          "

## 2018-12-04 RX ORDER — OMEPRAZOLE 40 MG/1
CAPSULE, DELAYED RELEASE ORAL
Start: 2018-12-04

## 2018-12-04 NOTE — TELEPHONE ENCOUNTER
"omeprazole (PRILOSEC) 40 MG capsule  Last Written Prescription Date:  11/2/18  Last Fill Quantity: 30,  # refills: 1   Last office visit: 11/2/2018 with prescribing provider:  brenda   Future Office Visit:   Next 5 appointments (look out 90 days)     Dec 14, 2018  3:00 PM CST   Office Visit with [facility]ibell [facility]aneda MD   [facility]    [address] [id number] [phone]                       Requested Prescriptions   Pending Prescriptions Disp Refills     omeprazole (PRILOSEC) 40 MG DR capsule [Pharmacy Med Name: OMEPRAZOLE DR 40 MG CAPSULE] 30 capsule 1     Sig: TAKE 1 CAPSULE (40 MG) BY MOUTH DAILY TAKE 30-60 MINUTES BEFORE A MEAL.    PPI Protocol Passed    12/3/2018 12:36 PM       Passed - Not on Clopidogrel (unless Pantoprazole ordered)       Passed - No diagnosis of osteoporosis on record       Passed - Recent (12 mo) or future (30 days) visit within the authorizing provider's specialty    Patient had office visit in the last 12 months or has a visit in the next 30 days with authorizing provider or within the authorizing provider's specialty.  See \"Patient Info\" tab in inbasket, or \"Choose Columns\" in Meds & Orders section of the refill encounter.             Passed - Patient is age 18 or older       Passed - No active pregnacy on record       Passed - No positive pregnancy test in past 12 months          "

## 2018-12-14 ENCOUNTER — OFFICE VISIT (OUTPATIENT)
Dept: FAMILY MEDICINE | Facility: CLINIC | Age: 73
End: 2018-12-14
Payer: MEDICARE

## 2018-12-14 VITALS
BODY MASS INDEX: 32.02 KG/M2 | SYSTOLIC BLOOD PRESSURE: 142 MMHG | HEIGHT: 62 IN | HEART RATE: 70 BPM | OXYGEN SATURATION: 97 % | TEMPERATURE: 97 F | DIASTOLIC BLOOD PRESSURE: 70 MMHG | WEIGHT: 174 LBS

## 2018-12-14 DIAGNOSIS — E78.5 HYPERLIPIDEMIA, UNSPECIFIED HYPERLIPIDEMIA TYPE: ICD-10-CM

## 2018-12-14 DIAGNOSIS — R10.13 DYSPEPSIA: ICD-10-CM

## 2018-12-14 DIAGNOSIS — F41.1 GENERALIZED ANXIETY DISORDER: Primary | ICD-10-CM

## 2018-12-14 DIAGNOSIS — R10.13 EPIGASTRIC PAIN: ICD-10-CM

## 2018-12-14 DIAGNOSIS — I10 BENIGN ESSENTIAL HYPERTENSION: ICD-10-CM

## 2018-12-14 PROCEDURE — 99214 OFFICE O/P EST MOD 30 MIN: CPT | Performed by: INTERNAL MEDICINE

## 2018-12-14 RX ORDER — OMEPRAZOLE 40 MG/1
40 CAPSULE, DELAYED RELEASE ORAL DAILY
Qty: 90 CAPSULE | Refills: 1 | Status: SHIPPED | OUTPATIENT
Start: 2018-12-14 | End: 2020-07-23

## 2018-12-14 RX ORDER — ESCITALOPRAM OXALATE 5 MG/1
5 TABLET ORAL DAILY
Qty: 30 TABLET | Refills: 3 | Status: SHIPPED | OUTPATIENT
Start: 2018-12-14 | End: 2019-01-21

## 2018-12-14 RX ORDER — AMLODIPINE BESYLATE 5 MG/1
5 TABLET ORAL DAILY
Qty: 90 TABLET | Refills: 3 | Status: SHIPPED | OUTPATIENT
Start: 2018-12-14 | End: 2019-08-29

## 2018-12-14 RX ORDER — ATORVASTATIN CALCIUM 20 MG/1
20 TABLET, FILM COATED ORAL DAILY
Qty: 90 TABLET | Refills: 1 | Status: SHIPPED | OUTPATIENT
Start: 2018-12-14 | End: 2019-07-29

## 2018-12-14 ASSESSMENT — MIFFLIN-ST. JEOR: SCORE: 1237.51

## 2018-12-14 NOTE — PROGRESS NOTES
"  SUBJECTIVE:   Carmen Bonner is a 73 year old female who presents to clinic today for the following health issues:  {Provider please address medication reconciliation discrepancies--rooming staff please delete if no med/rec issues}    Medication Followup of ***    Taking Medication as prescribed: {.:638870::\"yes\"}    Side Effects:  {NONEORCHOOSE:039741::\"None\"}    Medication Helping Symptoms:  {.:027121::\"yes\"}       {additional problems for provider to add:525204}    Problem list and histories reviewed & adjusted, as indicated.  Additional history: {NONE - AS DOCUMENTED:716425::\"as documented\"}    {HIST REVIEW/ LINKS 2:204337}    Reviewed and updated as needed this visit by clinical staff       Reviewed and updated as needed this visit by Provider         {PROVIDER CHARTING PREFERENCE:747454}    "

## 2018-12-14 NOTE — PROGRESS NOTES
SUBJECTIVE:   Carmen Bonner is a 73 year old female who presents to clinic today for the following health issues:    Follow up BP and medication refills    Hypertension  Takes 5 mg amlodipine and 40 mg lisinopril  Reports she did not take metoprolol since she did not want to  She was started on this medication for palpitations  Endorses she has not had another palpitation attack  Patient reports her BP tends to be elevated (BP today is 160/69) during day  She takes Seroquel and tizanidine at bedtime  Reports when these medications kick in her BP tends to be around 130/80  Anxiety could be contributing to elevated BP    Anxiety   Takes  mg Seroquel and has been on this medication since 4443-5082  This helps her to sleep  Anxiety has always been a problem for her  She believes that anxiety is a primary problem during the day  Reports she has not had a panic attack in past  Reports she does not see therapist currently  She does not see counselor  Endorses she feels she is doing well  Believes there is not much more counselor can do except prescribe more medication  She has tried Prozac in past with little effect  Has not tried Lexapro in past    Tobacco abuse  Patient has history of tobacco abuse  She has quit smoking in 1995    Problem list and histories reviewed & adjusted, as indicated.  Additional history: as documented    Medications and Labs reviewed in EPIC    Reviewed and updated as needed this visit by clinical staff  Tobacco  Meds       Reviewed and updated as needed this visit by Provider       ROS:  Constitutional, HEENT, cardiovascular, pulmonary, GI, , musculoskeletal, neuro, skin, endocrine and psych systems are negative, except as otherwise noted.    POSITIVE for anxiety    This document serves as a record of the services and decisions personally performed and made by Maribell Castaneda MD. It was created on her behalf by Jane Still, a trained medical scribe. The creation of this document is based  "on the provider's statements to the medical scribe.  Jane Still 3:10 PM December 14, 2018  OBJECTIVE:   /69 (BP Location: Right arm, Patient Position: Chair, Cuff Size: Adult Large)   Pulse 70   Temp 97  F (36.1  C) (Oral)   Ht 1.575 m (5' 2\")   Wt 78.9 kg (174 lb)   SpO2 97%   Breastfeeding? No   BMI 31.83 kg/m    Body mass index is 31.83 kg/m .    GENERAL: overweight, alert and no distress  PSYCH: mentation appears normal, affect normal/bright    Diagnostic Test Results:  none   ASSESSMENT/PLAN:   Carmen was seen today for recheck medication.    Diagnoses and all orders for this visit:    Generalized anxiety disorder  Compliant with medication  She is not seeing therapist or counselor currently  Seroquel seems to only work for her at night  Patient's anxiety is poorly controlled  Start 5 mg Lexapro once daily  Let me know how Lexapro is working during next follow up  -     escitalopram (LEXAPRO) 5 MG tablet; Take 1 tablet (5 mg) by mouth daily    Dyspepsia  Stable  Compliant with medication  -     omeprazole (PRILOSEC) 40 MG DR capsule; Take 1 capsule (40 mg) by mouth daily Take 30-60 minutes before a meal.  Per patient this medication works for her.   Her digestive symptoms have improved.     Epigastric pain  See above  -     omeprazole (PRILOSEC) 40 MG DR capsule; Take 1 capsule (40 mg) by mouth daily Take 30-60 minutes before a meal.    Hyperlipidemia, unspecified hyperlipidemia type  Stable  Compliant with medication  -     atorvastatin (LIPITOR) 20 MG tablet; Take 1 tablet (20 mg) by mouth daily    Benign essential hypertension  Compliant with amlodipine and lisinopril medications  She has not started taking metoprolol  She will start taking metoprolol after discussing today  Stress test is not covered by insurance  Reports she has not had another palpitation attack  BP was elevated today  BP rechecked in office  BP was 142/70  Educated patient about beta blockers  Advised patient start taking " metoprolol as prescribed  -     amLODIPine (NORVASC) 5 MG tablet; Take 1 tablet (5 mg) by mouth daily    She quit smoking in 1995  Patient Instructions   I refilled your prescriptions  Start Lexapro 5 mg  once daily  Start taking metoprolol 25 mg daily  Follow up in 6 weeks  Seek sooner medical attention if there is any worsening of symptoms or problems    The information in this document, created by the medical scribe for me, accurately reflects the services I personally performed and the decisions made by me. I have reviewed and approved this document for accuracy prior to leaving the patient care area.  December 14, 2018 3:27 PM    Maribell Castaneda MD  [facility]

## 2018-12-14 NOTE — PATIENT INSTRUCTIONS
I refilled your prescriptions  Start Lexapro 5 mg  once daily  Start taking metoprolol 25 mg daily    Follow up in 6 weeks  Seek sooner medical attention if there is any worsening of symptoms or problems

## 2019-01-21 ENCOUNTER — OFFICE VISIT (OUTPATIENT)
Dept: FAMILY MEDICINE | Facility: CLINIC | Age: 74
End: 2019-01-21
Payer: MEDICARE

## 2019-01-21 VITALS
HEART RATE: 78 BPM | WEIGHT: 166 LBS | TEMPERATURE: 98.5 F | HEIGHT: 62 IN | DIASTOLIC BLOOD PRESSURE: 70 MMHG | SYSTOLIC BLOOD PRESSURE: 150 MMHG | OXYGEN SATURATION: 97 % | BODY MASS INDEX: 30.55 KG/M2

## 2019-01-21 DIAGNOSIS — F41.1 GENERALIZED ANXIETY DISORDER: Primary | ICD-10-CM

## 2019-01-21 DIAGNOSIS — Z86.79 HISTORY OF PAROXYSMAL SUPRAVENTRICULAR TACHYCARDIA: ICD-10-CM

## 2019-01-21 PROBLEM — I47.10 PAROXYSMAL SUPRAVENTRICULAR TACHYCARDIA (H): Status: RESOLVED | Noted: 2018-11-02 | Resolved: 2019-01-21

## 2019-01-21 PROCEDURE — 99213 OFFICE O/P EST LOW 20 MIN: CPT | Performed by: INTERNAL MEDICINE

## 2019-01-21 RX ORDER — VENLAFAXINE 75 MG/1
75 TABLET ORAL DAILY
Qty: 30 TABLET | Refills: 3 | Status: SHIPPED | OUTPATIENT
Start: 2019-01-21 | End: 2019-02-11

## 2019-01-21 RX ORDER — VENLAFAXINE 37.5 MG/1
37.5 TABLET ORAL DAILY
Qty: 14 TABLET | Refills: 0 | Status: SHIPPED | OUTPATIENT
Start: 2019-01-21 | End: 2019-02-11

## 2019-01-21 ASSESSMENT — MIFFLIN-ST. JEOR: SCORE: 1201.22

## 2019-01-21 NOTE — PATIENT INSTRUCTIONS
Take 37.5 mg of Effexor for 14 days  If well tolerated, increase dose to 75 mg daily  Let me know how it works for you  Monitor your blood pressure once a week  at home.  Bring those readings on your next visit.  Notify us if your blood pressure readings consistently stays greater than 140/90.  Follow up in 6 weeks  Seek sooner medical attention if there is any worsening of symptoms or problems

## 2019-01-21 NOTE — PROGRESS NOTES
"  SUBJECTIVE:   Carmen Bonner is a 73 year old female who presents to clinic today for the following health issues:      Medication Followup     Taking Medication as prescribed: NO    Side Effects:  Yes    Medication Helping Symptoms:  NO       {additional problems for provider to add:786156}    Problem list and histories reviewed & adjusted, as indicated.  Additional history: {NONE - AS DOCUMENTED:180686::\"as documented\"}    {HIST REVIEW/ LINKS 2:006384}    Reviewed and updated as needed this visit by clinical staff  Allergies  Meds       Reviewed and updated as needed this visit by Provider         {PROVIDER CHARTING PREFERENCE:453250}  "
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
## 2019-02-11 ENCOUNTER — OFFICE VISIT (OUTPATIENT)
Dept: FAMILY MEDICINE | Facility: CLINIC | Age: 74
End: 2019-02-11
Payer: MEDICARE

## 2019-02-11 VITALS
OXYGEN SATURATION: 96 % | BODY MASS INDEX: 31.28 KG/M2 | DIASTOLIC BLOOD PRESSURE: 70 MMHG | WEIGHT: 170 LBS | HEIGHT: 62 IN | HEART RATE: 69 BPM | TEMPERATURE: 97.5 F | SYSTOLIC BLOOD PRESSURE: 160 MMHG

## 2019-02-11 DIAGNOSIS — I10 ESSENTIAL HYPERTENSION: ICD-10-CM

## 2019-02-11 DIAGNOSIS — F41.1 GENERALIZED ANXIETY DISORDER: Primary | ICD-10-CM

## 2019-02-11 DIAGNOSIS — J45.20 INTERMITTENT ASTHMA, UNCOMPLICATED: ICD-10-CM

## 2019-02-11 PROCEDURE — 99213 OFFICE O/P EST LOW 20 MIN: CPT | Performed by: INTERNAL MEDICINE

## 2019-02-11 RX ORDER — ALBUTEROL SULFATE 90 UG/1
2 AEROSOL, METERED RESPIRATORY (INHALATION) EVERY 4 HOURS PRN
Qty: 8.5 INHALER | Refills: 11 | Status: SHIPPED | OUTPATIENT
Start: 2019-02-11 | End: 2019-08-29

## 2019-02-11 RX ORDER — METOPROLOL SUCCINATE 25 MG/1
25 TABLET, EXTENDED RELEASE ORAL DAILY
Qty: 90 TABLET | Refills: 3 | Status: SHIPPED | OUTPATIENT
Start: 2019-02-11 | End: 2019-02-11

## 2019-02-11 RX ORDER — METOPROLOL SUCCINATE 25 MG/1
25 TABLET, EXTENDED RELEASE ORAL DAILY
Qty: 90 TABLET | Refills: 3
Start: 2019-02-11 | End: 2019-03-19

## 2019-02-11 RX ORDER — HYDROXYZINE HYDROCHLORIDE 10 MG/1
10 TABLET, FILM COATED ORAL EVERY 8 HOURS PRN
Qty: 30 TABLET | Refills: 1 | Status: SHIPPED | OUTPATIENT
Start: 2019-02-11 | End: 2019-07-29

## 2019-02-11 ASSESSMENT — ANXIETY QUESTIONNAIRES
IF YOU CHECKED OFF ANY PROBLEMS ON THIS QUESTIONNAIRE, HOW DIFFICULT HAVE THESE PROBLEMS MADE IT FOR YOU TO DO YOUR WORK, TAKE CARE OF THINGS AT HOME, OR GET ALONG WITH OTHER PEOPLE: NOT DIFFICULT AT ALL
3. WORRYING TOO MUCH ABOUT DIFFERENT THINGS: MORE THAN HALF THE DAYS
7. FEELING AFRAID AS IF SOMETHING AWFUL MIGHT HAPPEN: NOT AT ALL
GAD7 TOTAL SCORE: 9
6. BECOMING EASILY ANNOYED OR IRRITABLE: NOT AT ALL
2. NOT BEING ABLE TO STOP OR CONTROL WORRYING: MORE THAN HALF THE DAYS
1. FEELING NERVOUS, ANXIOUS, OR ON EDGE: MORE THAN HALF THE DAYS
5. BEING SO RESTLESS THAT IT IS HARD TO SIT STILL: NOT AT ALL

## 2019-02-11 ASSESSMENT — MIFFLIN-ST. JEOR: SCORE: 1219.36

## 2019-02-11 ASSESSMENT — PATIENT HEALTH QUESTIONNAIRE - PHQ9: 5. POOR APPETITE OR OVEREATING: NEARLY EVERY DAY

## 2019-02-11 NOTE — PROGRESS NOTES
"  SUBJECTIVE:   Carmen Bonner is a 73 year old female who presents to clinic today for the following health issues:    Anxiety Follow-Up    Status since last visit: No change    Other associated symptoms:None    Complicating factors:   Significant life event: No   Current substance abuse: None  Depression symptoms: No  ELIO-7 SCORE 1/2/2017 2/11/2019   Total Score 4 9     ELIO-7    Amount of exercise or physical activity: walking    Problems taking medications regularly: No    Medication side effects: nausea from the effexor    Diet: regular (no restrictions)    Effexor wasn't well-tolerated  She's tried Lexapro, Buspar, and Prozac in the past as well  Those was also not well-tolerated  She has only been using Seroquel  It helps her sleep  However, it's not helping her anxiety  She's concerned about her BP due to her anxiety  Reports that BP usually decreases after she takes Seroquel      Problem list and histories reviewed & adjusted, as indicated.  Additional history: as documented    Medications and labs reviewed in EPIC    Reviewed and updated as needed this visit by clinical staff  Tobacco  Allergies  Meds  Problems  Med Hx  Surg Hx  Fam Hx       Reviewed and updated as needed this visit by Provider  Allergies       ROS:  Constitutional, HEENT, cardiovascular, pulmonary, GI, , musculoskeletal, neuro, skin, endocrine and psych systems are negative, except as otherwise noted.    This document serves as a record of the services and decisions personally performed and made by Maribell Castaneda MD. It was created on her behalf by Courtney Bauer, a trained medical scribe. The creation of this document is based on the provider's statements to the medical scribe.  Courtney Bauer 3:15 PM February 11, 2019    OBJECTIVE:     /70   Pulse 69   Temp 97.5  F (36.4  C) (Tympanic)   Ht 1.575 m (5' 2\")   Wt 77.1 kg (170 lb)   SpO2 96%   BMI 31.09 kg/m    Body mass index is 31.09 kg/m .  GENERAL: healthy, " alert and no distress  PSYCH: mentation appears normal, affect normal/bright    Diagnostic Test Results:  none     ASSESSMENT/PLAN:     Carmen was seen today for recheck and anxiety.    Diagnoses and all orders for this visit:    Generalized anxiety disorder  -     hydrOXYzine (ATARAX) 10 MG tablet; Take 1 tablet (10 mg) by mouth every 8 hours as needed for anxiety  -  -     MENTAL HEALTH REFERRAL  - Adult; Psychiatry and Medication Management; Psychiatry; [facility]: [facility] [phone].  Medication management & future refills will be returned to [facility] PCP upon completion of evaluation; We suzanne...; Future  Patient didn't tolerate Effexor  It caused bad GI symptoms  Advised her to discontinue Effexor  She's tried Buspar, Lexapro, and Prozac in the past  They weren't well-tolerated  She is unable to resolve her anxiety conservatively  Prescribed hydroxyzine as a rescue medication  Referred to psychiatry  She will schedule appointment  Information about anxiety was provided to patient in office visit today    Essential hypertension  -     metoprolol succinate ER (TOPROL-XL) 25 MG 24 hr tablet; Take 1 tablet (25 mg) by mouth daily  Patient's BP has been elevated  She notices that it's correlated with her anxiety  Prescribed metoprolol  Explained that it could indhelp her anxiety as well as it is BB  Advised checking BP at home and reporting readings above 140/90    Intermittent asthma, uncomplicated  -     albuterol (PROAIR HFA) 108 (90 Base) MCG/ACT inhaler; Inhale 2 puffs into the lungs every 4 hours as needed for shortness of breath / dyspnea or wheezing  Doing well  Compliant with medication    Patient Instructions   I refilled your prescriptions  Discontinue Effexor  Use hydroxyzine as needed for anxiety as rescue medication   Make appointment with psychiatrist  Start taking metoprolol XL 25 mg daily for blood pressure  Monitor your blood pressure once a week at home.  Bring those readings on  your next visit.  Notify us if your blood pressure readings consistently stays greater than 140/90.  Mammogram is done in [address]  Please call the following number to make appointment at your earliest convenience:  [phone]  There is a new Shingles vaccine called SHINGRIX  It's is a series of two shots, 2-6 months apart  It is considered more than 90% effective  Please go to our pharmacy located in [address] to get the vaccine  Follow up in 6 weeks  Seek sooner medical attention if there is any worsening of symptoms or problems    The information in this document, created by the medical scribe for me, accurately reflects the services I personally performed and the decisions made by me. I have reviewed and approved this document for accuracy prior to leaving the patient care area.  February 11, 2019 3:34 PM    Maribell Castaneda MD  [facility]

## 2019-02-11 NOTE — PATIENT INSTRUCTIONS
I refilled your prescriptions  Discontinue Effexor  Use hydroxyzine as needed for anxiety as rescue medication   Make appointment with psychiatrist  Start taking metoprolol XL 25 mg daily for blood pressure  Monitor your blood pressure once a week at home.  Bring those readings on your next visit.  Notify us if your blood pressure readings consistently stays greater than 140/90.  Mammogram is done in [address]  Please call the following number to make appointment at your earliest convenience:  [phone]  There is a new Shingles vaccine called SHINGRIX  It's is a series of two shots, 2-6 months apart  It is considered more than 90% effective  Please go to our pharmacy located in [address] to get the vaccine  Follow up in 6 weeks  Seek sooner medical attention if there is any worsening of symptoms or problems  Patient Education     Treating Anxiety Disorders with Therapy    If you have an anxiety disorder, you don t have to suffer anymore. Treatment is available. Therapy (also called counseling) is often a helpful treatment for anxiety disorders. With therapy, a specially trained professional (therapist) helps you face and learn to manage your anxiety. Therapy can be short-term or long-term depending on your needs. In some cases, medicine may also be prescribed with therapy. It may take time before you notice how much therapy is helping, but stick with it. With therapy, you can feel better.  Cognitive behavioral therapy (CBT)  Cognitive behavioral therapy (CBT) teaches you to manage anxiety. It does this by helping you understand how you think and act when you re anxious. Research has shown CBT to be a very effective treatment for anxiety disorders. How CBT is run is almost like a class. It involves homework and activities to build skills that teach you to cope with anxiety step by step. It can be done in a group or one-on-one, and often takes place for a set number of sessions. CBT has two main parts:    Cognitive  therapy helps you identify the negative, irrational thoughts that occur with your anxiety. You ll learn to replace these with more positive, realistic thoughts.    Behavioral therapy helps you change how you react to anxiety. You ll learn coping skills and methods for relaxing to help you better deal with anxiety.  Other forms of therapy  Other therapy methods may work better for you than CBT. Or, you may move from CBT to another form of therapy as your treatment needs change. This may mean meeting with a therapist by yourself or in a group. Therapy can also help you work through problems in your life, such as drug or alcohol dependence, that may be making your anxiety worse.  Getting better takes time  Therapy will help you feel better and teach you skills to help manage anxiety long term. But change doesn t happen right away. It takes a commitment from you. And treatment only works if you learn to face the causes of your anxiety. So, you might feel worse before you feel better. This can sometimes make it hard to stick with it. But remember: Therapy is a very effective treatment. The results will be well worth it.  Helping yourself  If anxiety is wearing you down, here are some things you can do to cope:    Check with your doctor and rule out any physical problems that may be causing the anxiety symptoms.    If an anxiety disorder is diagnosed, seek mental healthcare. This is an illness and it can respond to treatment. Most types of anxiety disorders will respond to talk therapy and medicine.    Educate yourself about anxiety disorders. Keep track of helpful online resources and books you can use during stressful periods.    Try stress management techniques such as meditation.    Consider online or in-person support groups.    Don t fight your feelings. Anxiety feeds itself. The more you worry about it, the worse it gets. Instead, try to identify what might have triggered your anxiety. Then try to put this threat  in perspective.    Keep in mind that you can t control everything about a situation. Change what you can and let the rest take its course.    Exercise -- it s a great way to relieve tension and help your body feel relaxed.    Examine your life for stress, and try to find ways to reduce it.    Avoid caffeine and nicotine, which can make anxiety symptoms worse.    Fight the temptation to turn to alcohol or unprescribed drugs for relief. They only make things worse in the long run.   Date Last Reviewed: 1/1/2017 2000-2018 [employer]. [address] [place] [address]. All rights reserved. This information is not intended as a substitute for professional medical care. Always follow your healthcare professional's instructions.           Patient Education     Understanding Anxiety Disorders    Almost everyone gets nervous now and then. It s normal to have knots in your stomach before a test, or for your heart to race on a first date. But an anxiety disorder is much more than a case of nerves. In fact, its symptoms may be overwhelming. But treatment can relieve many of these symptoms. Talking to your healthcare provider is the first step.  What are anxiety disorders?  An anxiety disorder causes intense feelings of panic and fear. These feelings may arise for no apparent reason. And they tend to recur again and again. They may prevent you from coping with life and cause you great distress. As a result, you may avoid anything that triggers your fear. In extreme cases, you may never leave the house. Anxiety disorders may cause other symptoms, such as:    Obsessive thoughts you can t control    Constant nightmares or painful thoughts of the past    Nausea, sweating, and muscle tension    Trouble sleeping or concentrating  What causes anxiety disorders?  Anxiety disorders tend to run in families. For some people, childhood abuse or neglect may play a role. For others, stressful life events or trauma  may trigger anxiety disorders. Anxiety can trigger low self-esteem and poor coping skills.  Common anxiety disorders    Panic disorder. This causes an intense fear of being in danger.    Phobias. These are extreme fears of certain objects, places, or events.    Obsessive-compulsive disorder. This causes you to have unwanted thoughts and urges. You also may perform certain actions over and over.    Posttraumatic stress disorder. This occurs in people who have survived a terrible ordeal. It can cause nightmares and flashbacks about the event.    Generalized anxiety disorder. This causes constant worry that can greatly disrupt your life.   Getting better  You may believe that nothing can help you. Or, you might fear what others may think. But most anxiety symptoms can be eased. Having an anxiety disorder is nothing to be ashamed of. Most people do best with treatment that combines medicine and therapy. These aren t cures. But they can help you live a healthier life.  Date Last Reviewed: 2/1/2017 2000-2018 [employer]. [address] [place] [address], [place] [address]. All rights reserved. This information is not intended as a substitute for professional medical care. Always follow your healthcare professional's instructions.

## 2019-02-12 ASSESSMENT — ANXIETY QUESTIONNAIRES: GAD7 TOTAL SCORE: 9

## 2019-03-13 ENCOUNTER — TELEPHONE (OUTPATIENT)
Dept: FAMILY MEDICINE | Facility: CLINIC | Age: 74
End: 2019-03-13

## 2019-03-13 NOTE — TELEPHONE ENCOUNTER
"Was in [place]  Pt thought she had a UTI while she was there  Ended up in ER  Received a \"big shot in the butt\" and then 3 days of cipro  Her urethral orifice is still warm- this is her concern  She reports drinking a lot of fluids already  Denies urgency  Denies burning- per se  She just finished her cipro today    Angel CAMPOS RN  "

## 2019-03-14 ENCOUNTER — OFFICE VISIT (OUTPATIENT)
Dept: FAMILY MEDICINE | Facility: CLINIC | Age: 74
End: 2019-03-14
Payer: MEDICARE

## 2019-03-14 VITALS
BODY MASS INDEX: 31.47 KG/M2 | DIASTOLIC BLOOD PRESSURE: 72 MMHG | HEIGHT: 62 IN | HEART RATE: 74 BPM | WEIGHT: 171 LBS | OXYGEN SATURATION: 98 % | SYSTOLIC BLOOD PRESSURE: 188 MMHG | TEMPERATURE: 98.6 F

## 2019-03-14 DIAGNOSIS — N95.2 ATROPHIC VAGINITIS: ICD-10-CM

## 2019-03-14 DIAGNOSIS — R30.0 DYSURIA: Primary | ICD-10-CM

## 2019-03-14 DIAGNOSIS — F41.9 ANXIETY: ICD-10-CM

## 2019-03-14 LAB
ALBUMIN UR-MCNC: ABNORMAL MG/DL
APPEARANCE UR: CLEAR
BILIRUB UR QL STRIP: NEGATIVE
COLOR UR AUTO: YELLOW
GLUCOSE UR STRIP-MCNC: NEGATIVE MG/DL
HGB UR QL STRIP: ABNORMAL
KETONES UR STRIP-MCNC: NEGATIVE MG/DL
LEUKOCYTE ESTERASE UR QL STRIP: ABNORMAL
NITRATE UR QL: NEGATIVE
PH UR STRIP: 7 PH (ref 5–7)
RBC #/AREA URNS AUTO: NORMAL /HPF
SOURCE: ABNORMAL
SP GR UR STRIP: 1.01 (ref 1–1.03)
UROBILINOGEN UR STRIP-ACNC: 0.2 EU/DL (ref 0.2–1)
WBC #/AREA URNS AUTO: NORMAL /HPF

## 2019-03-14 PROCEDURE — 99213 OFFICE O/P EST LOW 20 MIN: CPT | Performed by: NURSE PRACTITIONER

## 2019-03-14 PROCEDURE — 81001 URINALYSIS AUTO W/SCOPE: CPT | Performed by: NURSE PRACTITIONER

## 2019-03-14 RX ORDER — ESTRADIOL 0.1 MG/G
2 CREAM VAGINAL
Qty: 42.5 G | Refills: 1 | Status: ON HOLD | OUTPATIENT
Start: 2019-03-14 | End: 2022-04-28

## 2019-03-14 ASSESSMENT — MIFFLIN-ST. JEOR: SCORE: 1223.9

## 2019-03-14 NOTE — PROGRESS NOTES
"HPI    SUBJECTIVE:   Carmen Bonner is a 73 year old female who presents to clinic today for the following health issues:      URINARY TRACT SYMPTOMS      Duration: 7 days    Description  dysuria    Intensity:  ?    Accompanying signs and symptoms:  Fever/chills:yes  Flank pain no   Nausea and vomiting: no   Vaginal symptoms: none  Abdominal/Pelvic Pain: no     History  History of frequent UTI's: no   History of kidney stones: no   Sexually Active: no   Possibility of pregnancy: No    Precipitating or alleviating factors: None    Therapies tried and outcome: course of antibiotics - CIpro   Outcome: Unsuccessful    Not really dysuria but \"feels a little on the strong side\"   No frequency  Gets urinary urgency with anxiety   Also has severe lifelong anxiety.     Past Medical History:   Diagnosis Date     Allergic rhinitis      Anxiety     on seroquel     Asthma      Cervicalgia      Chronic pain      Diabetes (H)     now diet controlled     DJD (degenerative joint disease) of lumbar spine     CHRONIC HAS SEEN PAIN CLINIC- put on tizanidine      Endometriosis      Finger pain      Finger swelling      Flank pain      GERD (gastroesophageal reflux disease)      Hematuria      Herpes      Hypertension      Hyponatremia      Insomnia     on seroquel     Insomnia      Knee pain      Purpura (H)     SAW DERM      Recurrent UTI      Skin cancer     basal cell      Stress incontinence      Upper back pain      Family History   Problem Relation Age of Onset     Cancer Mother      Osteoporosis Mother      Cerebrovascular Disease Father      Alzheimer Disease Father      Genitourinary Problems Maternal Grandmother      Heart Disease Paternal Grandfather      Osteoporosis Sister      Arthritis Sister      Breast Cancer Paternal Grandmother      Colon Cancer No family hx of      Past Surgical History:   Procedure Laterality Date     CHOLECYSTECTOMY       HYSTERECTOMY TOTAL ABDOMINAL       Social History     Tobacco Use     Smoking " status: Former Smoker     Packs/day: 1.00     Years: 26.00     Pack years: 26.00     Last attempt to quit:      Years since quittin.2     Smokeless tobacco: Never Used   Substance Use Topics     Alcohol use: No     Alcohol/week: 0.0 oz     Comment: Attends AA Meetings, sober since      Current Outpatient Medications   Medication Sig Dispense Refill     acetaminophen (ACETAMINOPHEN EXTRA STRENGTH) 500 MG tablet Take 500-1,000 mg by mouth every 6 hours as needed for mild pain       albuterol (PROAIR HFA) 108 (90 Base) MCG/ACT inhaler Inhale 2 puffs into the lungs every 4 hours as needed for shortness of breath / dyspnea or wheezing 8.5 Inhaler 11     amLODIPine (NORVASC) 5 MG tablet Take 1 tablet (5 mg) by mouth daily 90 tablet 3     atorvastatin (LIPITOR) 20 MG tablet Take 1 tablet (20 mg) by mouth daily 90 tablet 1     calcium carbonate (TUMS) 500 MG chewable tablet Take 1 chew tab by mouth daily       Cranberry 140-100-3 MG-MG-UNIT CAPS Take 1 capsule by mouth daily       estradiol (ESTRACE) 0.1 MG/GM vaginal cream Place 2 g vaginally twice a week 42.5 g 1     lisinopril (PRINIVIL/ZESTRIL) 40 MG tablet Take 1 tablet (40 mg) by mouth daily 90 tablet 3     mometasone (NASONEX) 50 MCG/ACT nasal spray Spray 2 sprays into both nostrils daily 1 Box 3     montelukast (SINGULAIR) 10 MG tablet Take 1 tablet (10 mg) by mouth At Bedtime 90 tablet 3     omeprazole (PRILOSEC) 40 MG DR capsule Take 1 capsule (40 mg) by mouth daily Take 30-60 minutes before a meal. (Patient taking differently: Take 40 mg by mouth as needed Take 30-60 minutes before a meal.) 90 capsule 1     QUEtiapine (SEROQUEL) 100 MG tablet TAKE 1 AND 1/2 TABLETS BY MOUTH ONCE DAILY AT BEDTIME 135 tablet 1     triamcinolone (KENALOG) 0.1 % cream Apply topically 2 times daily 45 g 1     valACYclovir (VALTREX) 1000 mg tablet Take 1 tablet (1,000 mg) by mouth as needed Herpes Outbreak 12 tablet 1     vitamin  B complex with vitamin C (VITAMIN  B  "COMPLEX) TABS Take 1 tablet by mouth daily       metoprolol succinate ER (TOPROL-XL) 50 MG 24 hr tablet Take 1 tablet (50 mg) by mouth daily 30 tablet 1     tiZANidine (ZANAFLEX) 2 MG tablet TAKE 2 TABLETS (4 MG) BY MOUTH 2 TIMES DAILY AS NEEDED FOR MUSCLE SPASMS 120 tablet 1     Allergies   Allergen Reactions     Lexapro [Escitalopram]      nausea     Omeprazole Diarrhea     Sulfa Drugs Itching     Venlafaxine Nausea       Reviewed and updated as needed this visit by clinical staff and provider     ROS  Detailed as above     /72 (BP Location: Right arm, Patient Position: Chair, Cuff Size: Adult Large)   Pulse 74   Temp 98.6  F (37  C) (Tympanic)   Ht 1.575 m (5' 2\")   Wt 77.6 kg (171 lb)   SpO2 98%   BMI 31.28 kg/m     Physical Exam   Constitutional: She appears well-developed.   Pulmonary/Chest: Effort normal.   Neurological: She is alert.   Psychiatric: She has a normal mood and affect. Judgment normal.     UA RESULTS:  Recent Labs   Lab Test 03/14/19  1341   COLOR Yellow   APPEARANCE Clear   URINEGLC Negative   URINEBILI Negative   URINEKETONE Negative   SG 1.015   UBLD Small*   URINEPH 7.0   PROTEIN Trace*   UROBILINOGEN 0.2   NITRITE Negative   LEUKEST Trace*   RBCU O - 2   WBCU 0 - 5          Assessment and Plan:       ICD-10-CM    1. Dysuria R30.0 *UA reflex to Microscopic and Culture ([place] [facility] (except [place] and [place])     Urine Microscopic   2. Atrophic vaginitis N95.2 estradiol (ESTRACE) 0.1 MG/GM vaginal cream   3. Anxiety F41.9      Neg UA today. Suspect atrophic vaginitis as cause of symptoms. Can try OTC replens for starters then can try estradiol if no benefit. Follow-up with pcp with persistent symptoms       Raghu Jaquez, APRN, CNP  [facility]   "

## 2019-03-19 ENCOUNTER — OFFICE VISIT (OUTPATIENT)
Dept: PSYCHIATRY | Facility: CLINIC | Age: 74
End: 2019-03-19
Attending: INTERNAL MEDICINE
Payer: MEDICARE

## 2019-03-19 VITALS
WEIGHT: 171 LBS | DIASTOLIC BLOOD PRESSURE: 62 MMHG | TEMPERATURE: 98.9 F | BODY MASS INDEX: 31.47 KG/M2 | HEIGHT: 62 IN | SYSTOLIC BLOOD PRESSURE: 160 MMHG | OXYGEN SATURATION: 97 % | HEART RATE: 58 BPM | RESPIRATION RATE: 18 BRPM

## 2019-03-19 DIAGNOSIS — I10 ESSENTIAL HYPERTENSION: ICD-10-CM

## 2019-03-19 DIAGNOSIS — F51.05 INSOMNIA DUE TO OTHER MENTAL DISORDER: Primary | ICD-10-CM

## 2019-03-19 DIAGNOSIS — F41.1 GENERALIZED ANXIETY DISORDER: ICD-10-CM

## 2019-03-19 DIAGNOSIS — F99 INSOMNIA DUE TO OTHER MENTAL DISORDER: Primary | ICD-10-CM

## 2019-03-19 PROCEDURE — 90792 PSYCH DIAG EVAL W/MED SRVCS: CPT | Performed by: NURSE PRACTITIONER

## 2019-03-19 RX ORDER — METOPROLOL SUCCINATE 50 MG/1
50 TABLET, EXTENDED RELEASE ORAL DAILY
Qty: 30 TABLET | Refills: 1 | Status: SHIPPED | OUTPATIENT
Start: 2019-03-19 | End: 2019-07-29

## 2019-03-19 ASSESSMENT — ANXIETY QUESTIONNAIRES
7. FEELING AFRAID AS IF SOMETHING AWFUL MIGHT HAPPEN: NOT AT ALL
GAD7 TOTAL SCORE: 8
6. BECOMING EASILY ANNOYED OR IRRITABLE: NOT AT ALL
GAD7 TOTAL SCORE: 8
2. NOT BEING ABLE TO STOP OR CONTROL WORRYING: MORE THAN HALF THE DAYS
4. TROUBLE RELAXING: SEVERAL DAYS
7. FEELING AFRAID AS IF SOMETHING AWFUL MIGHT HAPPEN: NOT AT ALL
3. WORRYING TOO MUCH ABOUT DIFFERENT THINGS: MORE THAN HALF THE DAYS
GAD7 TOTAL SCORE: 8
5. BEING SO RESTLESS THAT IT IS HARD TO SIT STILL: NOT AT ALL
1. FEELING NERVOUS, ANXIOUS, OR ON EDGE: NEARLY EVERY DAY

## 2019-03-19 ASSESSMENT — PATIENT HEALTH QUESTIONNAIRE - PHQ9
SUM OF ALL RESPONSES TO PHQ QUESTIONS 1-9: 3
SUM OF ALL RESPONSES TO PHQ QUESTIONS 1-9: 3
10. IF YOU CHECKED OFF ANY PROBLEMS, HOW DIFFICULT HAVE THESE PROBLEMS MADE IT FOR YOU TO DO YOUR WORK, TAKE CARE OF THINGS AT HOME, OR GET ALONG WITH OTHER PEOPLE: SOMEWHAT DIFFICULT

## 2019-03-19 ASSESSMENT — PAIN SCALES - GENERAL: PAINLEVEL: NO PAIN (1)

## 2019-03-19 ASSESSMENT — MIFFLIN-ST. JEOR: SCORE: 1223.9

## 2019-03-19 NOTE — PATIENT INSTRUCTIONS
Treatment Plan:    Continue Seroquel (quetiapine) 100 - 150 mg by mouth daily at bedtime for anxiety and sleep.     Increase metoprolol XR to 50 mg by mouth daily for hypertension and anxiety.     Consider trying Vistaril/Atarax (hydroxyzine) 10 mg by mouth up to 3 times per day for anxiety and sleep.     Consider adding Neurontin (gabapentin) for anxiety.     Continue all other medications as reviewed per electronic medical record today.     Safety plan reviewed. To the Emergency Department as needed or call after hours crisis line at [phone] or [phone]. [place] Crisis Text Line: Text [place] to 403135  or  Suicide LifeLine Chat: suicidepreventionlifeline.org/chat    To schedule individual or family therapy, call [facility] at [phone].   Thank you for our work together in the Psychiatry Collaborative Care Model at [facility]. This is our last visit and I am returning your care back to your Primary Care Provider Maribell Castaneda MD . If you are not doing well, please contact your Primary Care Provider office.     Follow up with primary care provider as planned or for acute medical concerns.    Call the psychiatric nurse line with medication questions or concerns at [phone].

## 2019-03-19 NOTE — NURSING NOTE
"Chief Complaint   Patient presents with     Consult     Referred by Dr Castaneda-anxiety ongoing, pt feels anxiety is increasing BP     initial /78 (BP Location: Left arm, Patient Position: Chair, Cuff Size: Adult Regular)   Pulse 58   Temp 98.9  F (37.2  C) (Oral)   Resp 18   Ht 1.575 m (5' 2\")   Wt 77.6 kg (171 lb)   SpO2 97%   BMI 31.28 kg/m   Estimated body mass index is 31.28 kg/m  as calculated from the following:    Height as of this encounter: 1.575 m (5' 2\").    Weight as of this encounter: 77.6 kg (171 lb)..  bp completed using cuff size regular    "

## 2019-03-19 NOTE — PROGRESS NOTES
"                                                         Outpatient Psychiatric Evaluation- Standard  Adult    Name:  Carmen Bonner  : 1945    Source of Referral:  Primary Care Provider: Maribell Castnaeda MD - last visit 2019  Current Psychotherapist: None - used in the past, not interested    Identifying Data:  Patient is a 73 year old, single/ White American female  who presents for initial visit with me.  Patient is currently retired and was on disability in the past. Patient attended the session alone. Consent to communicate signed for Veronica Waters and Edilia Jones patient's sister's. Consent for treatment signed and included in electronic medical record. Discussed limits of confidentiality today. My Practice Policy was reviewed and signed.     Chief Complaint:  Consultation.  Patient reports: \"high blood pressure and how anxiety affects it\"  Patient prefers to be called: \"Carmen\"    Answers for HPI/ROS submitted by the patient on 3/19/2019   If you checked off any problems, how difficult have these problems made it for you to do your work, take care of things at home, or get along with other people?: Somewhat difficult  PHQ9 TOTAL SCORE: 3  ELIO 7 TOTAL SCORE: 8    HPI:  History of therapy and psychiatry many years ago. Started to struggle with blood pressure when she stopped taking Xanax (alprazolam) in . Was on it prior to that years ago. Patient notes she stopped taking the Xanax (alprazolam) to \"have a full spiritual experience as part of the 12 steps\".  Has been taking Seroquel (quetiapine) for years. Would not sleep without it. Patient notes that \"I had more physical symptoms such as pain and acid reflux since stopping Xanax (alprazolam)\". Patient was in [place] and [place] for 20 years but moved back to [place] to be close to her family. In her housing complex her upstairs neighbors are loud and cannot go to bed until later. Mood has been \"blood pressure is " "worrisome\". Patient has just been started on beta blocker for hypertension and does not want to add more medications. She feels the only mediations that helped her in the past was Valium (diazepam) or Xanax (alprazolam).     Past diagnoses include: Anxiety, Insomnia  Current medications include: Seroquel (quetiapine) 100- 150 mg  Medication side effects: Denies  Current stressors include: Symptoms, Medical Comorbidities  Coping mechanisms and supports include: Spirituality, hopes to exercise more, Sisters, Quilting, Design, Travel    Psychiatric Review of Symptoms:  Depression: Interest: Decrease    Energy: Decrease    Appetite: Increase and Decrease     Irritability: Increase     Ruminations: Increase    PHQ-9 scores:   PHQ-9 SCORE 1/2/2017 3/19/2019   PHQ-9 Total Score 5 3     [place]:  Distractibility: Increase   MDQ Score: Negative Screen  Anxiety: Feeling nervous, anxious, or on edge    Uncontrolled worrying    Worrying too much about different things    Trouble relaxing    ELIO-7 scores:    ELIO-7 SCORE 1/2/2017 2/11/2019 3/19/2019   Total Score 4 9 8     Panic:  No symptoms   Agoraphobia:  No   PTSD:  No symptoms   OCD:  No symptoms   Psychosis: No symptoms   ADD / ADHD: No symptoms  Gambling or shoplifting: No   Eating Disorder:  No symptoms  Sleep:   Trouble falling asleep    Trouble staying asleep     History of sleep consult- Obstructive Sleep Apnea but did not tolerate mask and started Seroquel (quetiapine) instead    Psychiatric History:   Hospitalizations: [facility] [place] [facility] in late 1980s due to  needing to change medication for anxiety. No Emergency Department visits due to panic or anxiety  History of Commitment? No   Past Treatment: counseling, physician / PCP, medication(s) from physician / PCP and psychiatry  Suicide Attempts: No   Current Suicide Risk:  Suicide Assessment Completed Today.  Self-injurious Behavior: Denies  Electroconvulsive Therapy (ECT) or Transcranial Magnetic Stimulation " "(TMS): No   GeneSight Genetic Testing: No     Past medication trials include but are not limited to:   Seroquel (quetiapine) 100 - 150 mg   Vistaril/Atarax (hydroxyzine) 10 mg- did not try this  Effexor (venlafaxine) \"wasn't tolerated\"  Lexapro (escitalopram) \"allergy\" due to nausea  Buspar (buspirone)  Prozac (fluoxetine)   Elavil (amitriptyline)  Xanax (alprazolam)   Tofranil (imipramine)   Haldol (haloperidol)  Ambien (zolpidem) not helpful or tolerated  Melatonin not helpful  Valium (diazepam) helped    Substance Use History:  Current use of drugs or alcohol: Denies   Patient reports no problems as a result of their drinking / drug use.   Based on the clinical interview, there  are not indications of drug or alcohol abuse.  CAGE-AID Score today was: 0  Tobacco use: History of 1 ppd smoker for 26 years and quit in 1995  Caffeine:  Yes  1 cup of half calf cups/day of coffee  Patient has not received chemical dependency treatment in the past  Recovery Programming Involvement: Not Applicable and None but has gone through 12 step programming    Past Medical History:  Past Medical History:   Diagnosis Date     Allergic rhinitis      Anxiety     on seroquel     Asthma      Cervicalgia      Chronic pain      Diabetes (H)     now diet controlled     DJD (degenerative joint disease) of lumbar spine     CHRONIC HAS SEEN PAIN CLINIC- put on tizanidine      Endometriosis      Finger pain      Finger swelling      Flank pain      GERD (gastroesophageal reflux disease)      Hematuria      Herpes      Hypertension      Hyponatremia      Insomnia     on seroquel     Insomnia      Knee pain      Purpura (H)     SAW DERM      Recurrent UTI      Skin cancer     basal cell      Stress incontinence      Upper back pain       Surgery:   Past Surgical History:   Procedure Laterality Date     CHOLECYSTECTOMY       HYSTERECTOMY TOTAL ABDOMINAL       Food and Medicine Allergies:     Allergies   Allergen Reactions     Lexapro " [Escitalopram]      nausea     Omeprazole Diarrhea     Sulfa Drugs Itching     Venlafaxine Nausea     Primary Care Provider: Maribell Castaneda MD  Seizures or Head Injury: No  Diet: Weight watchers  Exercise: walking  Supplements: Reviewed per Electronic Medical Record Today    Current Medications:    Current Outpatient Medications:      acetaminophen (ACETAMINOPHEN EXTRA STRENGTH) 500 MG tablet, Take 500-1,000 mg by mouth every 6 hours as needed for mild pain, Disp: , Rfl:      albuterol (PROAIR HFA) 108 (90 Base) MCG/ACT inhaler, Inhale 2 puffs into the lungs every 4 hours as needed for shortness of breath / dyspnea or wheezing, Disp: 8.5 Inhaler, Rfl: 11     amLODIPine (NORVASC) 5 MG tablet, Take 1 tablet (5 mg) by mouth daily, Disp: 90 tablet, Rfl: 3     atorvastatin (LIPITOR) 20 MG tablet, Take 1 tablet (20 mg) by mouth daily, Disp: 90 tablet, Rfl: 1     calcium carbonate (TUMS) 500 MG chewable tablet, Take 1 chew tab by mouth daily, Disp: , Rfl:      Cranberry 140-100-3 MG-MG-UNIT CAPS, Take 1 capsule by mouth daily, Disp: , Rfl:      estradiol (ESTRACE) 0.1 MG/GM vaginal cream, Place 2 g vaginally twice a week, Disp: 42.5 g, Rfl: 1     lisinopril (PRINIVIL/ZESTRIL) 40 MG tablet, Take 1 tablet (40 mg) by mouth daily, Disp: 90 tablet, Rfl: 3     metoprolol succinate ER (TOPROL-XL) 25 MG 24 hr tablet, Take 1 tablet (25 mg) by mouth daily, Disp: 90 tablet, Rfl: 3     mometasone (NASONEX) 50 MCG/ACT nasal spray, Spray 2 sprays into both nostrils daily, Disp: 1 Box, Rfl: 3     montelukast (SINGULAIR) 10 MG tablet, Take 1 tablet (10 mg) by mouth At Bedtime, Disp: 90 tablet, Rfl: 3     omeprazole (PRILOSEC) 40 MG DR capsule, Take 1 capsule (40 mg) by mouth daily Take 30-60 minutes before a meal. (Patient taking differently: Take 40 mg by mouth as needed Take 30-60 minutes before a meal.), Disp: 90 capsule, Rfl: 1     QUEtiapine (SEROQUEL) 100 MG tablet, TAKE 1 AND 1/2 TABLETS BY MOUTH ONCE DAILY AT BEDTIME, Disp: 135  "tablet, Rfl: 1     tiZANidine (ZANAFLEX) 2 MG tablet, Take 2 tablets (4 mg) by mouth 2 times daily as needed for muscle spasms, Disp: 120 tablet, Rfl: 1     triamcinolone (KENALOG) 0.1 % cream, Apply topically 2 times daily, Disp: 45 g, Rfl: 1     valACYclovir (VALTREX) 1000 mg tablet, Take 1 tablet (1,000 mg) by mouth as needed Herpes Outbreak, Disp: 12 tablet, Rfl: 1     vitamin  B complex with vitamin C (VITAMIN  B COMPLEX) TABS, Take 1 tablet by mouth daily, Disp: , Rfl:     The [place] Prescription Monitoring Program has been reviewed and there are no concerns about diversionary activity for controlled substances at this time.  No data for controlled substances over the last one year.     Vital Signs:  Vitals: /78 (BP Location: Left arm, Patient Position: Chair, Cuff Size: Adult Regular)   Pulse 58   Temp 98.9  F (37.2  C) (Oral)   Resp 18   Ht 1.575 m (5' 2\")   Wt 77.6 kg (171 lb)   SpO2 97%   BMI 31.28 kg/m      Labs:  Most recent laboratory results reviewed and the pertinent results include:   Office Visit on 03/14/2019   Component Date Value Ref Range Status     Color Urine 03/14/2019 Yellow   Final     Appearance Urine 03/14/2019 Clear   Final     Glucose Urine 03/14/2019 Negative  NEG^Negative mg/dL Final     Bilirubin Urine 03/14/2019 Negative  NEG^Negative Final     Ketones Urine 03/14/2019 Negative  NEG^Negative mg/dL Final     Specific Gravity Urine 03/14/2019 1.015  1.003 - 1.035 Final     Blood Urine 03/14/2019 Small* NEG^Negative Final     pH Urine 03/14/2019 7.0  5.0 - 7.0 pH Final     Protein Albumin Urine 03/14/2019 Trace* NEG^Negative mg/dL Final     Urobilinogen Urine 03/14/2019 0.2  0.2 - 1.0 EU/dL Final     Nitrite Urine 03/14/2019 Negative  NEG^Negative Final     Leukocyte Esterase Urine 03/14/2019 Trace* NEG^Negative Final     Source 03/14/2019 Midstream Urine   Final     WBC Urine 03/14/2019 0 - 5  OTO5^0 - 5 /HPF Final     RBC Urine 03/14/2019 O - 2  OTO2^O - 2 /HPF Final "   Orders Only on 06/22/2018   Component Date Value Ref Range Status     Occult Blood Scn FIT 06/22/2018 Negative  NEG^Negative Final     Most recent labs reviewed and no new labs.   Most recent EKG from 10/31/2018 reviewed. QTc interval 414.  Abnormal EKG.     Review of Systems:  10 systems (general, cardiovascular, respiratory, eyes, ENT, endocrine, GI, , M/S, neurological) were reviewed. Most pertinent finding(s) is/are: hypertension . The remaining systems are all unremarkable.    Family History:   Patient reported family history includes:   Family History   Problem Relation Age of Onset     Cancer Mother      Osteoporosis Mother      Cerebrovascular Disease Father      Alzheimer Disease Father      Genitourinary Problems Maternal Grandmother      Heart Disease Paternal Grandfather      Osteoporosis Sister      Arthritis Sister      Breast Cancer Paternal Grandmother      Colon Cancer No family hx of      Mental Illness History: Yes: Per EPIC Electronic Medical Record  Substance Abuse History: Denies  Suicide History: Denies  Medications: Denies     Social History:   Birth place: [facility]  Childhood: Yes intact home  Siblings:  two Brother(s),  two Sister(s)  Highest education level was college graduate.   Current Living situation:  [place], [place] with self in senior living condo .  x2. Feels safe at home.  Children: zero     Legal History:  No: Patient denies any legal history    Significant Losses / Trauma / Abuse / Neglect Issues:  There are indications or report of significant loss, trauma, abuse or neglect issues related to: major medical problems   and divorce / relational changes  .   Issues of possible neglect are not present.   A safety and risk management plan has not been developed at this time, however client was given the after-hours number / 911 should there be a change in any of these risk factors.    Mental Status Examination:     Appearance:  awake, alert, adequately groomed, appeared  stated age, mild distress and normal weight  Attitude:  cooperative   Eye Contact:  adequate and wears glasses  Gait and Station: Normal, No assistive Devices used and No dizziness or falls  Psychomotor Behavior:  no evidence of tardive dyskinesia, dystonia, or tics  Oriented to:  time, person, and place  Attention Span and Concentration:  Normal  Speech:  clear, coherent, regular rate, regular rhythm and fluent  Mood:  good  Affect:  euthymic  Associations:  no loose associations  Thought Process:  logical, linear and goal oriented  Thought Content:  no evidence of suicidal ideation or homicidal ideation and no evidence of psychotic thought  Recent and Remote Memory:  intact to interview. Not formally assessed. No amnesia.  Fund of Knowledge: advanced  Insight:  good  Judgment:  intact and adequate for safety  Impulse Control:  intact    Suicide Risk Assessment:  Today Carmen Bonner reports no depression and anxiety. In addition, there are notable risk factors for self-harm, including age, single status, anxiety, comorbid medical condition of hypertension and insomnia. However, risk is mitigated by commitment to family, spiritual/Mosque beliefs, sobriety, absence of past attempts, future oriented, no access to firearms or weapons, denies suicidal intent or plan, no family history of suicide and denies homicidal ideation, intent, or plan. Therefore, based on all available evidence including the factors cited above, Carmen Bonner does not appear to be at imminent risk for self-harm, does not meet criteria for a 72-hr hold, and therefore remains appropriate for ongoing outpatient level of care.  A thorough assessment of risk factors related to suicide and self-harm have been reviewed and are noted above. The patient convincingly denies acute suicidality on several occasions. Local community safety resources reviewed and printed for patient to use if needed. There was no deceit detected, and the patient presented  in a manner that was believable.     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  Carmen Bonner has no plan or code status on file. Discussed available resources and provided with information. Confirmed code status reflects current choices pending further ACP discussions.  Confirmed/documented legally designated decision maker(s). Added by Divine Epperson           Stool incontinence 11/28/2014     Priority: Medium     Pain in thoracic spine 04/02/2014     Priority: Medium     Lumbar spondylosis      Priority: Medium     Do you wish to do the replacement in the background? yes         Anxiety      Priority: Medium     Upper back pain      Priority: Medium     Hypertension      Priority: Medium     Arthritis 01/28/2014     Priority: Medium     -Causing chronic pain, unable to take NSAIDs secondary to gastritis.  Takes tizanidine 1-2 times daily for pain.       GERD (gastroesophageal reflux disease) 01/27/2014     Priority: Medium     Worsened by NSAIDs       Insomnia 01/27/2014     Priority: Medium     BMI 31 01/22/2014     Priority: Medium     CARDIOVASCULAR SCREENING; LDL GOAL LESS THAN 130 01/22/2014     Priority: Medium     HTN, goal below 140/90 01/22/2014     Priority: Medium     Intermittent asthma 01/27/2014     Priority: Low       Psychosocial & Contextual Factors:  Medical Comorbidites    Strengths and Opportunities:   Carmen PEACOCK  Ha identified the following strengths or resources that will help she succeed in counseling: commitment to health and well being, friends / good social support, family support and intelligence. Things that may interfere with the patient's success include:  none noted at this time.    There are no language or communication issues or need for modification in treatment.   There are no ethnic, cultural or Church factors that may be relevant for therapy.  Client identified their preferred language to be English.  Client does not need the assistance of an  or other support involved in therapy.    A 12-item WHODAS 2.0 assessment was completed by the patient today and recorded in EPIC.    WHODAS 2.0 Total Score 3/19/2019   Total Score 15   Total Score MyChart 15       Impression:  Carmen Bonner has lifelong history of anxiety that continues and also affects her sleep. This also likely contributes to her hypertension. Patient recently tried the metoprolol XL 25 mg 6 weeks ago. Taking in the morning.     Medication side effects and alternatives reviewed. Health promotion activities recommended and reviewed today. All questions addressed. Education and counseling completed regarding risks and benefits of medications and psychotherapy options. Collaborative Care Psychiatry Service model reviewed today. Recommend therapy for additional support.     Seroquel (quetiapine) taking for sleep and anxiety and limited improvement. Black box warning for use of antipsychotic medications in this age group. Should be stopped. Also not checking baseline or follow up labs for use of antipsychotics including lipids and blood glucose or abnormal involuntary movement scales. Patient notes that she has to get baseline labs drawn from her Primary Care Provider.    I would avoid benzodiazepines.  Neurontin (gabapentin) is an alternative for anxiety. There is also room to increase dosing of Seroquel (quetiapine) if needed. For  anxiety and hypertension, would recommend increasing dose of metoprolol. Vistaril/Atarax (hydroxyzine) is a good option as needed for anxiety and insomnia. Patient has not tried this. Patient checks her blood pressure at home and Seroquel (quetiapine) does reduce her blood pressure per her report. Patient is hesitant to make many medication changes. Would be reasonable to first increase dosing of metoprolol and other considerations are below and can be tried with Primary Care Provider. Returning care back to Primary Care Provider.       Treatment Plan:    Continue Seroquel (quetiapine) 100 - 150 mg by mouth daily at bedtime for anxiety and sleep.     Increase metoprolol XR to 50 mg by mouth daily for hypertension and anxiety.     Consider trying Vistaril/Atarax (hydroxyzine) 10 mg by mouth up to 3 times per day for anxiety and sleep.     Consider adding Neurontin (gabapentin) for anxiety.     Continue all other medications as reviewed per electronic medical record today.     Safety plan reviewed. To the Emergency Department as needed or call after hours crisis line at [phone] or [phone]. [place] Crisis Text Line: Text [place] to 817894  or  Suicide LifeLine Chat: suicidepreventionlifeline.org/chat    To schedule individual or family therapy, call [facility] at [phone].   Thank you for our work together in the Psychiatry Collaborative Care Model at [facility]. This is our last visit and I am returning your care back to your Primary Care Provider Maribell Castaneda MD . If you are not doing well, please contact your Primary Care Provider office.     Follow up with primary care provider as planned or for acute medical concerns.    Call the psychiatric nurse line with medication questions or concerns at [phone].    Community Resources:    National Suicide Prevention Lifeline: 858.743.1667 (TTY: 315.575.2354). Call anytime for help.   (www.suicidepreventionlifeline.org)  [place] on [place]ness (www.symone.org): 838.489.9618 or 415-375-7895.   Mental Health Association (www.mentalhealth.org): 119.682.1570 or 798-734-0754.  [place] Crisis Text Line: Text [place] to 372940  Suicide LifeLine Chat: suicideStar[employer].org/chat    Administrative Billing:   Time spent with patient was 60 minutes and greater than 50% of time or 40 minutes was spent in counseling and coordination of care regarding above diagnoses and treatment plan.    Patient Status:  The patient is being returned to the referring provider for ongoing care and medication prescribing.  The patient can be referred back to this service for further consultation as needed.    Signed:   Lia Beverly, PhD, APRN, CNP  Psychiatry

## 2019-03-19 NOTE — Clinical Note
Jono Castaneda,Psychiatry Consult. I am sending care back to you for ongoing care and medication prescribing.  Future medication refills will come from you. I recommended that the patient follow up with you as planned next week. More details about treatment plan are in my note. If you have any questions or concerns, please let me know. The patient can be referred back to this service for further consultation as needed.Lia

## 2019-03-20 ASSESSMENT — PATIENT HEALTH QUESTIONNAIRE - PHQ9: SUM OF ALL RESPONSES TO PHQ QUESTIONS 1-9: 3

## 2019-03-20 ASSESSMENT — ANXIETY QUESTIONNAIRES: GAD7 TOTAL SCORE: 8

## 2019-04-25 ENCOUNTER — HOSPITAL ENCOUNTER (OUTPATIENT)
Dept: MAMMOGRAPHY | Facility: CLINIC | Age: 74
Discharge: HOME | End: 2019-04-25
Attending: INTERNAL MEDICINE | Admitting: INTERNAL MEDICINE
Payer: MEDICARE

## 2019-04-25 DIAGNOSIS — Z12.31 VISIT FOR SCREENING MAMMOGRAM: ICD-10-CM

## 2019-04-25 PROCEDURE — 77063 BREAST TOMOSYNTHESIS BI: CPT

## 2019-06-06 DIAGNOSIS — F51.01 PRIMARY INSOMNIA: ICD-10-CM

## 2019-06-06 RX ORDER — QUETIAPINE FUMARATE 100 MG/1
TABLET, FILM COATED ORAL
Qty: 45 TABLET | Refills: 0 | Status: SHIPPED | OUTPATIENT
Start: 2019-06-06 | End: 2019-07-13

## 2019-06-06 NOTE — TELEPHONE ENCOUNTER
Last Written Prescription Date:  5/09/19  Last Fill Quantity: 45 tablet,  # refills: 0   Last office visit: 2/11/2019 with prescribing provider:  Tonya   Future Office Visit:      Requested Prescriptions   Pending Prescriptions Disp Refills     QUEtiapine (SEROQUEL) 100 MG tablet [Pharmacy Med Name: QUETIAPINE FUMARATE 100 MG TAB] 45 tablet 0     Sig: TAKE 1 AND ONE HALF TABLETS BY MOUTH ONCE DAILY AT BEDTIME       Antipsychotic Medications Failed - 6/6/2019  1:20 AM        Failed - Blood pressure under 140/90 in past 12 months     BP Readings from Last 3 Encounters:   03/19/19 160/62   03/14/19 188/72   02/11/19 160/70                 Failed - Lipid panel on file within the past 12 months     Recent Labs   Lab Test 01/17/17  1047  03/05/12   CHOL 195   < > 232*   TRIG 114   < > 95   HDL 56   < > 66   *   < > 147   NHDL 139*   < >  --    VLDL  --   --  19    < > = values in this interval not displayed.               Passed - Patient is 12 years of age or older        Passed - CBC on file in past 12 months     Recent Labs   Lab Test 10/31/18  2257   WBC 6.1   RBC 4.24   HGB 12.2   HCT 37.1                    Passed - Heart Rate on file within past 12 months     Pulse Readings from Last 3 Encounters:   03/19/19 58   03/14/19 74   02/11/19 69               Passed - A1c or Glucose on file in past 12 months     Recent Labs   Lab Test 10/31/18  2257  01/28/13   *   < > 94   A1C  --   --  5.4    < > = values in this interval not displayed.       Please review patients last 3 weights. If a weight gain of >10 lbs exists, you may refill the prescription once after instructing the patient to schedule an appointment within the next 30 days.    Wt Readings from Last 3 Encounters:   03/19/19 77.6 kg (171 lb)   03/14/19 77.6 kg (171 lb)   02/11/19 77.1 kg (170 lb)             Passed - Medication is active on med list        Passed - Patient is not pregnant        Passed - No positve pregnancy test on file in  "past 12 months        Passed - Recent (6 mo) or future (30 days) visit within the authorizing provider's specialty     Patient had office visit in the last 6 months or has a visit in the next 30 days with authorizing provider or within the authorizing provider's specialty.  See \"Patient Info\" tab in inbasket, or \"Choose Columns\" in Meds & Orders section of the refill encounter.              "

## 2019-07-26 NOTE — PROGRESS NOTES
Subjective     Carmen Bonner is a 73 year old female who presents to clinic today for the following health issues:    HPI   Edema      Duration: 6 weeks    Description (location/character/radiation): swollen feet and ankles    Intensity:  severe    Accompanying signs and symptoms: painful    History (similar episodes/previous evaluation): None    Precipitating or alleviating factors: None    Therapies tried and outcome: None     Patient has seen NP for medication follow up  Reports her edema hasn't improved  However, she remembers tizanidine helped her edema in the past  Reports that compression socks restrict her flow  They cause coldness in her feet    Patient brought in BP readings from home    Patient Active Problem List   Diagnosis     BMI 31     CARDIOVASCULAR SCREENING; LDL GOAL LESS THAN 130     HTN, goal below 140/90     Intermittent asthma     GERD (gastroesophageal reflux disease)     Insomnia due to other mental disorder     Arthritis     Lumbar spondylosis     Upper back pain     Hypertension     Pain in thoracic spine     Stool incontinence     Advanced directives, counseling/discussion     Allergic rhinitis     Hyperlipidemia, unspecified hyperlipidemia type     History of paroxysmal supraventricular tachycardia     Generalized anxiety disorder     Past Surgical History:   Procedure Laterality Date     CHOLECYSTECTOMY       HYSTERECTOMY TOTAL ABDOMINAL         Social History     Tobacco Use     Smoking status: Former Smoker     Packs/day: 1.00     Years: 26.00     Pack years: 26.00     Last attempt to quit:      Years since quittin.5     Smokeless tobacco: Never Used   Substance Use Topics     Alcohol use: No     Alcohol/week: 0.0 oz     Comment: Attends AA Meetings, sober since      Family History   Problem Relation Age of Onset     Cancer Mother      Osteoporosis Mother      Cerebrovascular Disease Father      Alzheimer Disease Father      Genitourinary Problems Maternal Grandmother       Heart Disease Paternal Grandfather      Osteoporosis Sister      Arthritis Sister      Breast Cancer Paternal Grandmother      Colon Cancer No family hx of          Current Outpatient Medications   Medication Sig Dispense Refill     acetaminophen (ACETAMINOPHEN EXTRA STRENGTH) 500 MG tablet Take 500-1,000 mg by mouth every 6 hours as needed for mild pain       albuterol (PROAIR HFA) 108 (90 Base) MCG/ACT inhaler Inhale 2 puffs into the lungs every 4 hours as needed for shortness of breath / dyspnea or wheezing 8.5 Inhaler 11     amLODIPine (NORVASC) 5 MG tablet Take 1 tablet (5 mg) by mouth daily 90 tablet 3     Cranberry 140-100-3 MG-MG-UNIT CAPS Take 1 capsule by mouth daily       estradiol (ESTRACE) 0.1 MG/GM vaginal cream Place 2 g vaginally twice a week 42.5 g 1     hydrochlorothiazide (HYDRODIURIL) 25 MG tablet Take 1 tablet (25 mg) by mouth daily 90 tablet 1     lisinopril (PRINIVIL/ZESTRIL) 40 MG tablet Take 1 tablet (40 mg) by mouth daily 90 tablet 3     mometasone (NASONEX) 50 MCG/ACT nasal spray Spray 2 sprays into both nostrils daily 1 Box 3     montelukast (SINGULAIR) 10 MG tablet Take 1 tablet (10 mg) by mouth At Bedtime 90 tablet 3     omeprazole (PRILOSEC) 40 MG DR capsule Take 1 capsule (40 mg) by mouth daily Take 30-60 minutes before a meal. (Patient taking differently: Take 40 mg by mouth as needed Take 30-60 minutes before a meal.) 90 capsule 1     QUEtiapine (SEROQUEL) 100 MG tablet TAKE 1 AND ONE HALF TABLETS BY MOUTH ONCE DAILY AT BEDTIME 45 tablet 3     tiZANidine (ZANAFLEX) 2 MG tablet TAKE 2 TABLETS (4 MG) BY MOUTH 2 TIMES DAILY AS NEEDED FOR MUSCLE SPASMS 120 tablet 3     triamcinolone (KENALOG) 0.1 % cream Apply topically 2 times daily 45 g 1     valACYclovir (VALTREX) 1000 mg tablet Take 1 tablet (1,000 mg) by mouth as needed Herpes Outbreak 12 tablet 1     Recent Labs   Lab Test 10/31/18  2257 01/17/17  1047 04/19/16  1450 04/17/15  1523 09/16/14  1346  01/28/13 08/10/12 03/06/12  03/05/12   A1C  --   --   --   --   --   --  5.4 5.8 5.7 5.7   LDL  --  116* 150*  --   --   --   --   --   --  147   HDL  --  56 62  --   --   --   --   --   --  66   TRIG  --  114 87  --   --   --   --   --   --  95   ALT  --   --  25 14 24   < > 15 14  --  19   CR 0.71 0.72 0.66 0.90 0.70   < > 0.85 0.79  --  0.72   GFRESTIMATED 80 79 88 62 83   < > 70 77  --  86   GFRESTBLACK >90 >90   GFR Calc   >90   GFR Calc   75 >90   < > 81 89  --  100   POTASSIUM 3.4 3.9 4.1 4.2 4.6   < > 4.2 4.6  --  3.7   TSH 1.38 2.02  --   --   --   --   --   --   --  1.460    < > = values in this interval not displayed.      Reviewed and updated as needed this visit by Provider         Review of Systems   ROS COMP: Constitutional, HEENT, cardiovascular, pulmonary, GI, , musculoskeletal, neuro, skin, endocrine and psych systems are negative, except as otherwise noted.    This document serves as a record of the services and decisions personally performed and made by Maribell Castaneda MD. It was created on her behalf by Courtney Bauer, a trained medical scribe. The creation of this document is based on the provider's statements to the medical scribe.  Courtney Bauer 3:52 PM July 29, 2019        Objective    BP (!) 150/70   Pulse 79   Temp 99.1  F (37.3  C) (Tympanic)   Wt 82.1 kg (181 lb)   SpO2 95%   BMI 33.11 kg/m    Body mass index is 33.11 kg/m .  Physical Exam   GENERAL: healthy, alert and no distress  MS: bilateral LL edema  PSYCH: mentation appears normal, affect normal/bright    Diagnostic Test Results:  Labs reviewed in Epic  No results found for this or any previous visit (from the past 24 hour(s)).        Assessment & Plan     Carmen was seen today for edema.    Diagnoses and all orders for this visit:    Essential hypertension  -     hydrochlorothiazide (HYDRODIURIL) 25 MG tablet; Take 1 tablet (25 mg) by mouth daily  -     Basic metabolic panel  Patient brought in BP readings  They have  "been on higher side  Additionally, she is experiencing bilateral LL edema due to amlodipine and venous insufficiency  Reports that it's better in the morning  Worsens as the day goes on  Is unable to use compression socks  Reports they restrict her blood flow  Prescribed hydrochlorothiazide  Explained that it has to be taken in the morning  Advised holding it if she's dehydrated or sick    Edema of lower extremity    Due to amlodipine and venous insufficiency  Gets better in the morning.    Screen for colon cancer  -     Fecal colorectal cancer screen (FIT); Future  Opted out of colonsocopy  Will do FIT test at her convenience     BMI:   Estimated body mass index is 33.11 kg/m  as calculated from the following:    Height as of 3/19/19: 1.575 m (5' 2\").    Weight as of this encounter: 82.1 kg (181 lb).   Weight management plan: Discussed healthy diet and exercise guidelines    Patient Instructions   Use hydrochlorothiazide 25 mg daily  Take it in the morning as it can make you urinate  Keep your legs elevated when you're home  Do FIT test at your earliest convenience  There is a new Shingles vaccine called SHINGRIX  It's is a series of two shots, 2-6 months apart  It is considered more than 90% effective  Please go to any pharmacy to get the vaccine  Schedule physical and follow up in 1 months  Seek sooner medical attention if there is any worsening of symptoms or problems    Return in about 1 month (around 8/29/2019) for Physical Exam.    The information in this document, created by the medical scribe for me, accurately reflects the services I personally performed and the decisions made by me. I have reviewed and approved this document for accuracy prior to leaving the patient care area.  July 29, 2019 4:07 PM    Maribell Castaneda MD  [facility]      "

## 2019-07-29 ENCOUNTER — OFFICE VISIT (OUTPATIENT)
Dept: FAMILY MEDICINE | Facility: CLINIC | Age: 74
End: 2019-07-29
Payer: MEDICARE

## 2019-07-29 VITALS
DIASTOLIC BLOOD PRESSURE: 70 MMHG | BODY MASS INDEX: 33.11 KG/M2 | HEART RATE: 79 BPM | SYSTOLIC BLOOD PRESSURE: 150 MMHG | OXYGEN SATURATION: 95 % | WEIGHT: 181 LBS | TEMPERATURE: 99.1 F

## 2019-07-29 DIAGNOSIS — R60.0 EDEMA OF LOWER EXTREMITY: ICD-10-CM

## 2019-07-29 DIAGNOSIS — I10 ESSENTIAL HYPERTENSION: Primary | ICD-10-CM

## 2019-07-29 DIAGNOSIS — Z12.11 SCREEN FOR COLON CANCER: ICD-10-CM

## 2019-07-29 PROCEDURE — 36415 COLL VENOUS BLD VENIPUNCTURE: CPT | Performed by: INTERNAL MEDICINE

## 2019-07-29 PROCEDURE — 80048 BASIC METABOLIC PNL TOTAL CA: CPT | Performed by: INTERNAL MEDICINE

## 2019-07-29 PROCEDURE — 99213 OFFICE O/P EST LOW 20 MIN: CPT | Performed by: INTERNAL MEDICINE

## 2019-07-29 RX ORDER — HYDROCHLOROTHIAZIDE 25 MG/1
25 TABLET ORAL DAILY
Qty: 90 TABLET | Refills: 1 | Status: SHIPPED | OUTPATIENT
Start: 2019-07-29 | End: 2019-08-29

## 2019-07-29 NOTE — LETTER
[place] [address] [facility]  [address]  [place]  [address]  Tel: [phone]    July 30, 2019    Carmen Bonner  [address]  [facility] [id number]        Mary Morales,     This is to inform you regarding your test result.     Basic metabolic panel which includes electrolytes, blood sugar and kidney fucntion is normal       Sincerely,       Dr.Nasima Tonya MD,FACP         Enclosure: Lab Results  Results for orders placed or performed in visit on 07/29/19   Basic metabolic panel   Result Value Ref Range    Sodium 136 133 - 144 mmol/L    Potassium 4.5 3.4 - 5.3 mmol/L    Chloride 106 94 - 109 mmol/L    Carbon Dioxide 23 20 - 32 mmol/L    Anion Gap 7 3 - 14 mmol/L    Glucose 91 70 - 99 mg/dL    Urea Nitrogen 18 7 - 30 mg/dL    Creatinine 0.71 0.52 - 1.04 mg/dL    GFR Estimate 82 >60 mL/min/[1.73_m2]    GFR Estimate If Black >90 >60 mL/min/[1.73_m2]    Calcium 8.9 8.5 - 10.1 mg/dL

## 2019-07-30 LAB
ANION GAP SERPL CALCULATED.3IONS-SCNC: 7 MMOL/L (ref 3–14)
BUN SERPL-MCNC: 18 MG/DL (ref 7–30)
CALCIUM SERPL-MCNC: 8.9 MG/DL (ref 8.5–10.1)
CHLORIDE SERPL-SCNC: 106 MMOL/L (ref 94–109)
CO2 SERPL-SCNC: 23 MMOL/L (ref 20–32)
CREAT SERPL-MCNC: 0.71 MG/DL (ref 0.52–1.04)
GFR SERPL CREATININE-BSD FRML MDRD: 82 ML/MIN/{1.73_M2}
GLUCOSE SERPL-MCNC: 91 MG/DL (ref 70–99)
POTASSIUM SERPL-SCNC: 4.5 MMOL/L (ref 3.4–5.3)
SODIUM SERPL-SCNC: 136 MMOL/L (ref 133–144)

## 2019-07-30 ASSESSMENT — ASTHMA QUESTIONNAIRES: ACT_TOTALSCORE: 23

## 2019-07-30 NOTE — RESULT ENCOUNTER NOTE
Please notify patient by sending following letter with copy of test results      Mary Morales,    This is to inform you regarding your test result.    Basic metabolic panel which includes electrolytes, blood sugar and kidney fucntion is normal      Sincerely,      Dr.Nasima Tonya MD,FACP

## 2019-08-29 ENCOUNTER — OFFICE VISIT (OUTPATIENT)
Dept: FAMILY MEDICINE | Facility: CLINIC | Age: 74
End: 2019-08-29
Payer: MEDICARE

## 2019-08-29 VITALS
TEMPERATURE: 97.6 F | HEIGHT: 62 IN | DIASTOLIC BLOOD PRESSURE: 80 MMHG | OXYGEN SATURATION: 98 % | BODY MASS INDEX: 33.99 KG/M2 | WEIGHT: 184.7 LBS | SYSTOLIC BLOOD PRESSURE: 140 MMHG | HEART RATE: 69 BPM

## 2019-08-29 DIAGNOSIS — J45.20 INTERMITTENT ASTHMA, UNCOMPLICATED: ICD-10-CM

## 2019-08-29 DIAGNOSIS — F51.01 PRIMARY INSOMNIA: ICD-10-CM

## 2019-08-29 DIAGNOSIS — D64.9 ANEMIA, UNSPECIFIED TYPE: ICD-10-CM

## 2019-08-29 DIAGNOSIS — E78.5 HYPERLIPIDEMIA, UNSPECIFIED HYPERLIPIDEMIA TYPE: ICD-10-CM

## 2019-08-29 DIAGNOSIS — Z13.1 SCREENING FOR DIABETES MELLITUS: ICD-10-CM

## 2019-08-29 DIAGNOSIS — M54.6 ACUTE MIDLINE THORACIC BACK PAIN: ICD-10-CM

## 2019-08-29 DIAGNOSIS — Z13.29 SCREENING FOR THYROID DISORDER: ICD-10-CM

## 2019-08-29 DIAGNOSIS — Z13.0 SCREENING FOR DEFICIENCY ANEMIA: ICD-10-CM

## 2019-08-29 DIAGNOSIS — I10 BENIGN ESSENTIAL HYPERTENSION: ICD-10-CM

## 2019-08-29 DIAGNOSIS — R53.81 PHYSICAL DECONDITIONING: ICD-10-CM

## 2019-08-29 DIAGNOSIS — Z00.00 ENCOUNTER FOR MEDICARE ANNUAL WELLNESS EXAM: Primary | ICD-10-CM

## 2019-08-29 DIAGNOSIS — M25.60 JOINT STIFFNESS: ICD-10-CM

## 2019-08-29 LAB
CHOLEST SERPL-MCNC: 241 MG/DL
CRP SERPL-MCNC: <2.9 MG/L (ref 0–8)
ERYTHROCYTE [DISTWIDTH] IN BLOOD BY AUTOMATED COUNT: 13.5 % (ref 10–15)
GLUCOSE SERPL-MCNC: 109 MG/DL (ref 70–99)
HCT VFR BLD AUTO: 35.8 % (ref 35–47)
HDLC SERPL-MCNC: 55 MG/DL
HGB BLD-MCNC: 11.6 G/DL (ref 11.7–15.7)
LDLC SERPL CALC-MCNC: 161 MG/DL
MCH RBC QN AUTO: 28.9 PG (ref 26.5–33)
MCHC RBC AUTO-ENTMCNC: 32.4 G/DL (ref 31.5–36.5)
MCV RBC AUTO: 89 FL (ref 78–100)
NONHDLC SERPL-MCNC: 186 MG/DL
PLATELET # BLD AUTO: 225 10E9/L (ref 150–450)
RBC # BLD AUTO: 4.01 10E12/L (ref 3.8–5.2)
TRIGL SERPL-MCNC: 126 MG/DL
TSH SERPL DL<=0.005 MIU/L-ACNC: 2.15 MU/L (ref 0.4–4)
WBC # BLD AUTO: 5.2 10E9/L (ref 4–11)

## 2019-08-29 PROCEDURE — 82947 ASSAY GLUCOSE BLOOD QUANT: CPT | Performed by: INTERNAL MEDICINE

## 2019-08-29 PROCEDURE — 82728 ASSAY OF FERRITIN: CPT | Performed by: INTERNAL MEDICINE

## 2019-08-29 PROCEDURE — 86140 C-REACTIVE PROTEIN: CPT | Performed by: INTERNAL MEDICINE

## 2019-08-29 PROCEDURE — 84443 ASSAY THYROID STIM HORMONE: CPT | Performed by: INTERNAL MEDICINE

## 2019-08-29 PROCEDURE — G0439 PPPS, SUBSEQ VISIT: HCPCS | Performed by: INTERNAL MEDICINE

## 2019-08-29 PROCEDURE — 80061 LIPID PANEL: CPT | Performed by: INTERNAL MEDICINE

## 2019-08-29 PROCEDURE — 36415 COLL VENOUS BLD VENIPUNCTURE: CPT | Performed by: INTERNAL MEDICINE

## 2019-08-29 PROCEDURE — 99213 OFFICE O/P EST LOW 20 MIN: CPT | Mod: 25 | Performed by: INTERNAL MEDICINE

## 2019-08-29 PROCEDURE — 85027 COMPLETE CBC AUTOMATED: CPT | Performed by: INTERNAL MEDICINE

## 2019-08-29 RX ORDER — AMLODIPINE BESYLATE 5 MG/1
5 TABLET ORAL DAILY
Qty: 90 TABLET | Refills: 3 | Status: SHIPPED | OUTPATIENT
Start: 2019-08-29 | End: 2019-12-23

## 2019-08-29 RX ORDER — HYDROCHLOROTHIAZIDE 25 MG/1
25 TABLET ORAL DAILY
Qty: 90 TABLET | Refills: 3 | Status: SHIPPED | OUTPATIENT
Start: 2019-08-29 | End: 2020-07-23

## 2019-08-29 RX ORDER — MONTELUKAST SODIUM 10 MG/1
10 TABLET ORAL AT BEDTIME
Qty: 90 TABLET | Refills: 3 | Status: SHIPPED | OUTPATIENT
Start: 2019-08-29 | End: 2020-08-20

## 2019-08-29 RX ORDER — ALBUTEROL SULFATE 90 UG/1
2 AEROSOL, METERED RESPIRATORY (INHALATION) EVERY 4 HOURS PRN
Qty: 8.5 INHALER | Refills: 11 | Status: SHIPPED | OUTPATIENT
Start: 2019-08-29 | End: 2020-08-20

## 2019-08-29 RX ORDER — LISINOPRIL 40 MG/1
40 TABLET ORAL DAILY
Qty: 90 TABLET | Refills: 3 | Status: SHIPPED | OUTPATIENT
Start: 2019-08-29 | End: 2020-07-23

## 2019-08-29 ASSESSMENT — ACTIVITIES OF DAILY LIVING (ADL): CURRENT_FUNCTION: NO ASSISTANCE NEEDED

## 2019-08-29 ASSESSMENT — MIFFLIN-ST. JEOR: SCORE: 1286.04

## 2019-08-29 NOTE — PATIENT INSTRUCTIONS
Labs today  There is this is a new shingles vaccine available called shingrex  It is a series of 2 shots 2-6 months apart.  Considered more than 90% effective.  Please go to any pharmacy to get the  Vaccine  Make appointment with PHYSICL THERAPY  Monitor your blood pressure once a week  at home.  Bring those readings on your next visit.  Notify us if your blood pressure readings consistently stays greater than 140/90.  Follow up in 2 months  Seek sooner medical attention if there is any worsening of symptoms or problems.        Patient Education   Personalized Prevention Plan  You are due for the preventive services outlined below.  Your care team is available to assist you in scheduling these services.  If you have already completed any of these items, please share that information with your care team to update in your medical record.  Health Maintenance Due   Topic Date Due     Zoster (Shingles) Vaccine (2 of 3) 02/26/2006     Annual Wellness Visit  04/19/2017     FIT Test  06/22/2019       Urinary Incontinence, Female (Adult)  Urinary incontinence means loss of control of the bladder. This problem affects many women, especially as they get older. If you have incontinence, you may be embarrassed to ask for help. But know that this problem can be treated.  Types of Incontinence  There are different types of incontinence. Two of the main types are described here. You can have more than one type.    Stress incontinence. With this type, urine leaks when pressure (stress) is put on the bladder. This may happen when you cough, sneeze, or laugh. Stress incontinence most often occurs because the pelvic floor muscles that support the bladder and urethra are weak. This can happen after pregnancy and vaginal childbirth or a hysterectomy. It can also be due to excess body weight or hormone changes.    Urge incontinence (also called overactive bladder). With this type, a sudden urge to urinate is felt often. This may happen  even though there may not be much urine in the bladder. The need to urinate often during the night is common. Urge incontinence most often occurs because of bladder spasms. This may be due to bladder irritation or infection. Damage to bladder nerves or pelvic muscles, constipation, and certain medicines can also lead to urge incontinence.  Treatment of urinary incontinence depends on the cause. Further evaluation is needed to find the type you have. This will likely include an exam and certain tests. Based on the results, you and your healthcare provider can then plan treatment. Until a diagnosis is made, the home care tips below can help relieve symptoms.  Home care    Do pelvic floor muscle exercises, if they are prescribed. The pelvic floor muscles help support the bladder and urethra. Many women find that their symptoms improve when doing special exercises that strengthen these muscles. To do the exercises contract the muscles you would use to stop your stream of urine, but do this when you re not urinating. Hold for 10 seconds, then relax. Repeat 10 to 20 times in a row, at least 3 times a day. Your provider may give you other instructions for how to do the exercises and how often.    Keep a bladder diary. This helps track how often and how much you urinate over a set period of time. Bring this diary with you to your next visit with the provider. The information can help your provider learn more about your bladder problem.    Lose weight, if advised to by your provider. Excess weight puts pressure on the bladder. Your provider can help you create a weight-loss plan that s right for you. This may include exercising more and making certain diet changes.    Don't consume foods and drinks that may irritate the bladder. These can include alcohol and caffeinated drinks.    Quit smoking. Smoking and other tobacco use can lead to chronic cough that strains the pelvic floor muscles. Smoking may also damage the bladder  and urethra. Talk with your provider about treatments or methods you can use to quit smoking.    If drinking large amounts of fluid causes you to have symptoms, you may be advised to limit your fluid intake. You may also be advised to drink most of your fluids during the day and to limit fluids at night.    If you re worried about urine leakage or accidents, you may wear absorbent pads to catch urine. Change the pads often. This helps reduce discomfort. It may also reduce the risk of skin or bladder infections.  Follow-up care  Follow up with your healthcare provider, or as directed. It may take some to find the right treatment for your problem. Your treatment plan may include special therapies or medicines. Certain procedures or surgery may also be options. Be sure to discuss any questions you have with your provider.  When to seek medical advice  Call the healthcare provider right away if any of these occur:    Fever of 100.4 F (38 C) or higher, or as directed by your provider    Bladder pain or fullness    Abdominal swelling    Nausea or vomiting    Back pain    Weakness, dizziness or fainting  Date Last Reviewed: 10/1/2017    9910-3232 [employer]. [address] [place], [place] [address]. All rights reserved. This information is not intended as a substitute for professional medical care. Always follow your healthcare professional's instructions.        Your Health Risk Assessment indicates you feel you are not in good emotional health.    Recreation   Recreation is not limited to sports and team events. It includes any activity that provides relaxation, interest, enjoyment, and exercise. Recreation provides an outlet for physical, mental, and social energy. It can give a sense of worth and achievement. It can help you stay healthy.    Mental Exercise and Social Involvement  Mental and emotional health is as important as physical health. Keep in touch with friends and family. Stay as active as  possible. Continue to learn and challenge yourself.   Things you can do to stay mentally active are:    Learn something new, like a foreign language or musical instrument.     Play SCRABBLE or do crossword puzzles. If you cannot find people to play these games with you at home, you can play them with others on your computer through the Internet.     Join a games club--anything from card games to chess or checkers or lawn bowling.     Start a new hobby.     Go back to school.     Volunteer.     Read.   Keep up with world events.

## 2019-08-29 NOTE — PROGRESS NOTES
"SUBJECTIVE:   Carmen Bonner is a 73 year old female who presents for Preventive Visit.    Are you in the first 12 months of your Medicare coverage?  No    Healthy Habits:    In general, how would you rate your overall health?  Very good    Frequency of exercise:: Walking between 5-7,000 steps.    Duration of exercise:: Walks laps in condo.    Do you usually eat at least 4 servings of fruit and vegetables a day, include whole grains    & fiber and avoid regularly eating high fat or \"junk\" foods?  Yes    Taking medications regularly:  No    Barriers to taking medications:  Other (lack of food - acid reflux issues)    Medication side effects:  None    Ability to successfully perform activities of daily living:  No assistance needed    Home Safety:  No safety concerns identified    Hearing Impairment:  No hearing concerns    In the past 6 months, have you been bothered by leaking of urine? Yes (dx with stress )    In general, how would you rate your overall mental or emotional health?  Fair (somewhat depressed and suffers anxiety )      PHQ-2 Total Score:    Additional concerns today:  No    Do you feel safe in your environment? Yes    Do you have a Health Care Directive? Yes: Patient states has Advance Directive and will bring in a copy to clinic.    Fall risk  Fallen 2 or more times in the past year?: No  Any fall with injury in the past year?: No  Cognitive Screening   1) Repeat 3 items (Leader, Season, Table)    2) Clock draw: NORMAL  3) 3 item recall: Recalls 3 objects  Results: 3 items recalled: COGNITIVE IMPAIRMENT LESS LIKELY    Mini-CogTM Copyright CODY Rahman. Licensed by the author for use in [facility]; reprinted with permission (gaye@.[facility]). All rights reserved.      Do you have sleep apnea, excessive snoring or daytime drowsiness?: snoring, sleep clinic once - dx with sleep apnea    Reviewed and updated as needed this visit by clinical staff  Tobacco  Allergies  Meds         Reviewed and " "updated as needed this visit by Provider        Social History     Tobacco Use     Smoking status: Former Smoker     Packs/day: 1.00     Years: 26.00     Pack years: 26.00     Last attempt to quit:      Years since quittin.6     Smokeless tobacco: Never Used   Substance Use Topics     Alcohol use: No     Alcohol/week: 0.0 oz     Comment: Attends AA Meetings, sober since      If you drink alcohol do you typically have >3 drinks per day or >7 drinks per week? No    No flowsheet data found.        Current providers sharing in care for this patient include:   Patient Care Team:  Maribell Castaneda MD as PCP - General (Internal Medicine)  Maribell Castaneda MD as Assigned PCP    The following health maintenance items are reviewed in Epic and correct as of today:  Health Maintenance   Topic Date Due     ZOSTER IMMUNIZATION (2 of 3) 2006     MEDICARE ANNUAL WELLNESS VISIT  2017     FIT  2019     INFLUENZA VACCINE (1) 2019     ASTHMA ACTION PLAN  2019     FALL RISK ASSESSMENT  2019     ASTHMA CONTROL TEST  2020     ADVANCE CARE PLANNING  2020     MAMMO SCREENING  2021     LIPID  2022     DTAP/TDAP/TD IMMUNIZATION (2 - Td) 2022     DEXA  Completed     PHQ-2  Completed     PNEUMOCOCCAL IMMUNIZATION 65+ LOW/MEDIUM RISK  Completed     IPV IMMUNIZATION  Aged Out     MENINGITIS IMMUNIZATION  Aged Out     Labs reviewed in EPIC      Review of Systems  Constitutional, HEENT, cardiovascular, pulmonary, GI, , musculoskeletal, neuro, skin, endocrine and psych systems are negative, except as otherwise noted.    OBJECTIVE:   BP (!) 140/80   Pulse 69   Temp 97.6  F (36.4  C) (Oral)   Ht 1.575 m (5' 2\")   Wt 83.8 kg (184 lb 11.2 oz)   SpO2 98%   BMI 33.78 kg/m   Estimated body mass index is 33.78 kg/m  as calculated from the following:    Height as of this encounter: 1.575 m (5' 2\").    Weight as of this encounter: 83.8 kg (184 lb 11.2 oz).  Physical " Exam  GENERAL: healthy, alert and no distress  EYES: Eyes grossly normal to inspection, PERRL and conjunctivae and sclerae normal  HENT: ear canals and TM's normal, nose and mouth without ulcers or lesions  NECK: no adenopathy,   RESP: lungs clear to auscultation - no rales, rhonchi or wheezes  BREAST: normal without masses, tenderness or nipple discharge and no palpable axillary masses or adenopathy  CV: regular rate and rhythm, normal S1 S2, no S3  and peripheral pulses strong  ABDOMEN: soft, nontender, no hepatosplenomegaly, no masses and bowel sounds normal  MS: no gross musculoskeletal defects noted, she has slight peripheral edema   She has changes due to osteoarthritis  She she had difficulty getting to the examination table and lying down but had no problem walking  SKIN: no suspicious lesions or rashes  NEURO: Normal strength and tone, mentation intact and speech normal  PSYCH: mentation appears normal, affect normal/bright    She had bandage on her lower leg where she had a skin biopsy    ASSESSMENT / PLAN:   Carmen was seen today for physical.    Diagnoses and all orders for this visit:    Encounter for Medicare annual wellness exam  She does fit test instead of colonoscopy  Up-to-date with pneumonia shot  Information about Shingrix provided  Mammogram on April 25, 2019 and was normal    Screening for thyroid disorder  -     TSH with free T4 reflex    Screening for diabetes mellitus  -     Glucose    Screening for deficiency anemia  -     CBC with platelets    Benign essential hypertension  -     amLODIPine (NORVASC) 5 MG tablet; Take 1 tablet (5 mg) by mouth daily  -     lisinopril (PRINIVIL/ZESTRIL) 40 MG tablet; Take 1 tablet (40 mg) by mouth daily  Patient's blood pressure is slightly elevated as usually she does not get get up so early  Plus she is deconditioned and  I advised her to monitor blood pressure at home      Intermittent asthma, uncomplicated  -     albuterol (PROAIR HFA) 108 (90 Base)  "MCG/ACT inhaler; Inhale 2 puffs into the lungs every 4 hours as needed for shortness of breath / dyspnea or wheezing  -     montelukast (SINGULAIR) 10 MG tablet; Take 1 tablet (10 mg) by mouth At Bedtime  Per patient asthma is well controlled    Primary insomnia  She takes Seroquel for this    Hyperlipidemia, unspecified hyperlipidemia type  -     Lipid panel reflex to direct LDL Fasting    Joint stiffness  -     [facility], AND CHIROPRACTIC REFERRAL; Future  -     CRP inflammation  Patient complain about feeling joint stiffness upper and lower back pain and some deconditioning  Physical therapy referral was done    Acute midline thoracic back pain  -     [facility], [facility] CHIR[facility]RACTIC REFERRAL; Future    Physical deconditioning  -     [facility], [facility] CHIR[facility]RACTIC REFERRAL; Future  Patient reports that she is having some difficulty getting up going to the bed moving in the bed  But able to do it  She is able to walk  She is able to drive  There is no localized pain or tenderness but overall she feels slightly stiff especially with movements  But if she walks and does not have to change the position she is okay    End of Life Planning:  Patient currently has an advanced directive: Yes.  Patient will bring copy    COUNSELING:  Reviewed preventive health counseling, as reflected in patient instructions       Regular exercise       Healthy diet/nutrition    Estimated body mass index is 33.78 kg/m  as calculated from the following:    Height as of this encounter: 1.575 m (5' 2\").    Weight as of this encounter: 83.8 kg (184 lb 11.2 oz).    Weight management plan: Discussed healthy diet and exercise guidelines     reports that she quit smoking about 24 years ago. She has a 26.00 pack-year smoking history. She has never used smokeless tobacco.      Appropriate preventive services were discussed with this patient, including applicable screening as appropriate for cardiovascular disease, diabetes, " osteopenia/osteoporosis, and glaucoma.  As appropriate for age/gender, discussed screening for colorectal cancer, prostate cancer, breast cancer, and cervical cancer. Checklist reviewing preventive services available has been given to the patient.    Reviewed patients plan of care and provided an AVS. The Basic Care Plan (routine screening as documented in Health Maintenance) for Carmen meets the Care Plan requirement. This Care Plan has been established and reviewed with the Patient.    Counseling Resources:  ATP IV Guidelines  Pooled Cohorts Equation Calculator  Breast Cancer Risk Calculator  FRAX Risk Assessment  ICSI Preventive Guidelines  Dietary Guidelines for Americans, 2010  USDA's MyPlate  ASA Prophylaxis  Lung CA Screening    Maribell Castaneda MD  [facility]    Identified Health Risks:

## 2019-08-29 NOTE — LETTER
[facility]  [address] [facility]  [address]  [place]  [address]  Tel: [phone]    August 30, 2019    Carmen Bonner  [address]  [facility] [address]        Dear Ms. Bonner,    Mary Morales,    This is to inform you regarding your test result.    C-reactive protein which is test to check for inflammation is normal  TSH which is thyroid hormone is normal.  Your total cholesterol is elevated.  HDL which is called good cholesterol is normal.  Your LDL which is called bad cholesterol is elevated.  Eat low cholesterol low fat  diet and do regular physical activity. Avoid high sugar containing food.   You should be on cholesterol lowering medication .  If you agree then let me know and I can send the script of Lipitor to pharmacy.  Glucose which is your blood sugar is slightly elevated.  Your hemoglobin is slightly low .  You are anemic.  Are you bleeding from anywhere like hemorrhoids?  Please let me know  Please make lab only appointment to recheck CBC in one month.     If you have any further questions or problems, please contact our office.      Sincerely,    Maribell Castaneda MD/ Georgie Hutchinson CMA  Results for orders placed or performed in visit on 08/29/19   Lipid panel reflex to direct LDL Fasting   Result Value Ref Range    Cholesterol 241 (H) <200 mg/dL    Triglycerides 126 <150 mg/dL    HDL Cholesterol 55 >49 mg/dL    LDL Cholesterol Calculated 161 (H) <100 mg/dL    Non HDL Cholesterol 186 (H) <130 mg/dL   TSH with free T4 reflex   Result Value Ref Range    TSH 2.15 0.40 - 4.00 mU/L   Glucose   Result Value Ref Range    Glucose 109 (H) 70 - 99 mg/dL   CBC with platelets   Result Value Ref Range    WBC 5.2 4.0 - 11.0 10e9/L    RBC Count 4.01 3.8 - 5.2 10e12/L    Hemoglobin 11.6 (L) 11.7 - 15.7 g/dL    Hematocrit 35.8 35.0 - 47.0 %    MCV 89 78 - 100 fl    MCH 28.9 26.5 - 33.0 pg    MCHC 32.4 31.5 - 36.5 g/dL    RDW 13.5 10.0 - 15.0 %    Platelet Count 225 150 - 450 10e9/L   CRP  inflammation   Result Value Ref Range    CRP Inflammation <2.9 0.0 - 8.0 mg/L               Enclosure: Lab Results

## 2019-08-29 NOTE — PROGRESS NOTES
Information on urinary incontinence and treatment options given to patient.  The patient was provided with suggestions to help her develop a healthy emotional lifestyle.

## 2019-08-30 LAB — FERRITIN SERPL-MCNC: 44 NG/ML (ref 8–252)

## 2019-08-30 NOTE — RESULT ENCOUNTER NOTE
Please notify patient by sending following letter with copy of test results      Mary Morales,    This is to inform you regarding your test result.    C-reactive protein which is test to check for inflammation is normal  TSH which is thyroid hormone is normal.  Your total cholesterol is elevated.  HDL which is called good cholesterol is normal.  Your LDL which is called bad cholesterol is elevated.  Eat low cholesterol low fat  diet and do regular physical activity. Avoid high sugar containing food.   You should be on cholesterol lowering medication .  If you agree then let me know and I can send the script of Lipitor to pharmacy.  Glucose which is your blood sugar is slightly elevated.  Your hemoglobin is slightly low .  You are anemic.  Are you bleeding from anywhere like hemorrhoids?  Please let me know  Please make lab only appointment to recheck CBC in one month.        Sincerely,      Dr.Nasima Tonya MD,FACP

## 2019-08-30 NOTE — RESULT ENCOUNTER NOTE
Mary Morales,    This is to inform you regarding your test result.    Ferritin which is iron stores in the body is on low end of normal.  Eat food rich in iron  We can recheck that in 4 months    Sincerely,      Dr.Nasima Tonya MD,FACP

## 2019-09-05 PROCEDURE — 82274 ASSAY TEST FOR BLOOD FECAL: CPT | Performed by: INTERNAL MEDICINE

## 2019-09-08 LAB — HEMOCCULT STL QL IA: NEGATIVE

## 2019-09-09 DIAGNOSIS — Z12.11 SCREEN FOR COLON CANCER: ICD-10-CM

## 2019-09-09 NOTE — LETTER
[place] [address] [facility]  [address]  [place]  [address]  Tel: [phone]    September 9, 2019    Carmen Bonner  [address]  [facility] [address]        Dear Ms. Bonner,    Mary Morales,     This is to inform you regarding your test result.     Fecal colorectal cancer screen (FIT) is negative    If you have any further questions or problems, please contact our office.      Sincerely,    Maribell Castaneda MD          Enclosure: Lab Results

## 2019-09-24 ENCOUNTER — THERAPY VISIT (OUTPATIENT)
Dept: PHYSICAL THERAPY | Facility: CLINIC | Age: 74
End: 2019-09-24
Attending: INTERNAL MEDICINE
Payer: MEDICARE

## 2019-09-24 DIAGNOSIS — R53.81 PHYSICAL DECONDITIONING: ICD-10-CM

## 2019-09-24 DIAGNOSIS — M54.6 ACUTE MIDLINE THORACIC BACK PAIN: ICD-10-CM

## 2019-09-24 DIAGNOSIS — M25.60 JOINT STIFFNESS: ICD-10-CM

## 2019-09-24 PROCEDURE — 97161 PT EVAL LOW COMPLEX 20 MIN: CPT | Mod: GP | Performed by: PHYSICAL THERAPIST

## 2019-09-24 PROCEDURE — 97140 MANUAL THERAPY 1/> REGIONS: CPT | Mod: GP | Performed by: PHYSICAL THERAPIST

## 2019-09-24 NOTE — PROGRESS NOTES
"[facility] Initial Evaluation  Subjective:  HPI   C/C: \"I am so deconditioned\".  Patient reports having lack of stamina and core strength.  Will note stiffness in low back especially first thing in the morning.  Gives a long history of bilateral knee pain.  States sitting is always been difficult.  Is interested in help the building and exercise program that will help her improve her strength and function.       Hx:   8/29/2019- went for yearly visit to [place].  Had marked difficulty transferring off of [facility] post examination.  Was given order to begin PT.  Patient gives no history of any specific trauma.  Has had no imaging.   PMH: Patient being treated for mild asthma, high blood pressure, depression.  Has not exercise regularly.  3 years ago had been walking in her home where she lives for 40 minutes.  General health: Fair to good.  Patient is retired.    Objective:  Standing Alignment:        Lumbar:  Lordosis incr            Gait:  Rotation mainly in LB.        Flexibility/Screens:   Negative screens: Hip                    Lumbar/SI Evaluation  ROM:    AROM Lumbar:   Flexion:          Fingers to shoes  Ext:                    WNL   Side Bend:        Left:  WNL    Right:  WNL  Rotation:           Left:     Right:   Side Glide:        Left:     Right:           Lumbar Myotomes:    T12-L3 (Hip Flex):  Left: 5    Right: 5    L4 (Ankle DF):  Left:  5    Right:  5  L5 (Great Toe Ext): Left: 5    Right: 5   S1 (Toe Raise):  Left: 5    Right: 5        Neural Tension/Mobility:      Left side:SLR or SLR w/DF  negative.     Right side:   SLR w/DF or SLR  negative.     Functional Tests:  Core strength and proprioception lumbar: Pt stands leaning back into lumbar facets.                 Cervical/Thoracic Evaluation    AROM:    AROM Thoracic:    Flexion:               WNL  Extension:          50%  Rotation:            Left: 30%     Right: 20%                                                          "         General     ROS    Assessment/Plan:    Patient is a 73 year old female with thoracic and lumbar complaints.    Patient has the following significant findings with corresponding treatment plan.                Diagnosis 1:  Low back and thoracic pain/stiffness/deconditioning  Pain -  manual therapy, self management, education and home program  Decreased ROM/flexibility - manual therapy and therapeutic exercise  Decreased joint mobility - manual therapy and therapeutic exercise  Decreased strength - therapeutic exercise and therapeutic activities  Impaired gait - gait training  Impaired muscle performance - neuro re-education  Decreased function - therapeutic activities  Impaired posture - neuro re-education    Therapy Evaluation Codes:   1) History comprised of:   Personal factors that impact the plan of care:        Cumulative Therapy Evaluation is: Low complexity.    Previous and current functional limitations:  (See Goal Flow Sheet for this information)    Short term and Long term goals: (See Goal Flow Sheet for this information)     Communication ability:  Patient appears to be able to clearly communicate and understand verbal and written communication and follow directions correctly.  Treatment Explanation - The following has been discussed with the patient:   RX ordered/plan of care  Anticipated outcomes  Possible risks and side effects  This patient would benefit from PT intervention to resume normal activities.   Rehab potential is good.    Frequency:  1 X week, once daily  Duration:  for 8 weeks  Discharge Plan:  Achieve all LTG.  Independent in home treatment program.  Reach maximal therapeutic benefit.    Please refer to the daily flowsheet for treatment today, total treatment time and time spent performing 1:1 timed codes.

## 2019-09-25 NOTE — PROGRESS NOTES
[facility] Initial Evaluation  Subjective:      General health as reported by patient is fair. Pertinent medical history includes:  Asthma, depression, high blood pressure, osteoarthritis and overweight.  Medical allergies: latex.   Other surgery history details: hysterectomy; gallbladder.  Current medications:  High blood pressure medication, sleep medication and muscle relaxants.    Other job/home tasks details: too sedentary.           Patient is retired.         Oswestry Score: 14 %                 Objective:  System    Physical Exam    General     ROS    Assessment/Plan:

## 2019-10-08 ENCOUNTER — THERAPY VISIT (OUTPATIENT)
Dept: PHYSICAL THERAPY | Facility: CLINIC | Age: 74
End: 2019-10-08
Attending: INTERNAL MEDICINE
Payer: MEDICARE

## 2019-10-08 DIAGNOSIS — M25.60 JOINT STIFFNESS: Primary | ICD-10-CM

## 2019-10-08 DIAGNOSIS — M54.6 ACUTE MIDLINE THORACIC BACK PAIN: ICD-10-CM

## 2019-10-08 DIAGNOSIS — R53.81 PHYSICAL DECONDITIONING: ICD-10-CM

## 2019-10-08 PROCEDURE — 97110 THERAPEUTIC EXERCISES: CPT | Mod: GP | Performed by: PHYSICAL THERAPIST

## 2019-10-08 PROCEDURE — 97112 NEUROMUSCULAR REEDUCATION: CPT | Mod: GP | Performed by: PHYSICAL THERAPIST

## 2019-10-15 ENCOUNTER — THERAPY VISIT (OUTPATIENT)
Dept: PHYSICAL THERAPY | Facility: CLINIC | Age: 74
End: 2019-10-15
Payer: MEDICARE

## 2019-10-15 DIAGNOSIS — M54.6 ACUTE MIDLINE THORACIC BACK PAIN: ICD-10-CM

## 2019-10-15 DIAGNOSIS — R53.81 PHYSICAL DECONDITIONING: ICD-10-CM

## 2019-10-15 DIAGNOSIS — M25.60 JOINT STIFFNESS: Primary | ICD-10-CM

## 2019-10-15 PROCEDURE — 97112 NEUROMUSCULAR REEDUCATION: CPT | Mod: GP | Performed by: PHYSICAL THERAPIST

## 2019-10-15 PROCEDURE — 97110 THERAPEUTIC EXERCISES: CPT | Mod: GP | Performed by: PHYSICAL THERAPIST

## 2019-10-22 ENCOUNTER — THERAPY VISIT (OUTPATIENT)
Dept: PHYSICAL THERAPY | Facility: CLINIC | Age: 74
End: 2019-10-22
Payer: MEDICARE

## 2019-10-22 DIAGNOSIS — M25.60 JOINT STIFFNESS: Primary | ICD-10-CM

## 2019-10-22 DIAGNOSIS — M54.6 ACUTE MIDLINE THORACIC BACK PAIN: ICD-10-CM

## 2019-10-22 DIAGNOSIS — R53.81 PHYSICAL DECONDITIONING: ICD-10-CM

## 2019-10-22 PROCEDURE — 97112 NEUROMUSCULAR REEDUCATION: CPT | Mod: GP | Performed by: PHYSICAL THERAPIST

## 2019-10-22 PROCEDURE — 97110 THERAPEUTIC EXERCISES: CPT | Mod: GP | Performed by: PHYSICAL THERAPIST

## 2019-10-29 ENCOUNTER — THERAPY VISIT (OUTPATIENT)
Dept: PHYSICAL THERAPY | Facility: CLINIC | Age: 74
End: 2019-10-29
Payer: MEDICARE

## 2019-10-29 DIAGNOSIS — M25.60 JOINT STIFFNESS: ICD-10-CM

## 2019-10-29 DIAGNOSIS — M54.6 ACUTE MIDLINE THORACIC BACK PAIN: Primary | ICD-10-CM

## 2019-10-29 DIAGNOSIS — M54.9 UPPER BACK PAIN: ICD-10-CM

## 2019-10-29 DIAGNOSIS — R53.81 PHYSICAL DECONDITIONING: ICD-10-CM

## 2019-10-29 PROCEDURE — 97112 NEUROMUSCULAR REEDUCATION: CPT | Mod: GP | Performed by: PHYSICAL THERAPIST

## 2019-10-29 PROCEDURE — 97110 THERAPEUTIC EXERCISES: CPT | Mod: GP | Performed by: PHYSICAL THERAPIST

## 2019-10-31 ENCOUNTER — OFFICE VISIT (OUTPATIENT)
Dept: FAMILY MEDICINE | Facility: CLINIC | Age: 74
End: 2019-10-31
Payer: MEDICARE

## 2019-10-31 VITALS
TEMPERATURE: 97.1 F | SYSTOLIC BLOOD PRESSURE: 140 MMHG | WEIGHT: 187.5 LBS | OXYGEN SATURATION: 98 % | BODY MASS INDEX: 34.5 KG/M2 | DIASTOLIC BLOOD PRESSURE: 70 MMHG | HEIGHT: 62 IN | HEART RATE: 72 BPM

## 2019-10-31 DIAGNOSIS — Z23 NEED FOR PROPHYLACTIC VACCINATION AND INOCULATION AGAINST INFLUENZA: ICD-10-CM

## 2019-10-31 DIAGNOSIS — E55.9 VITAMIN D DEFICIENCY: ICD-10-CM

## 2019-10-31 DIAGNOSIS — I10 ESSENTIAL HYPERTENSION: Primary | ICD-10-CM

## 2019-10-31 DIAGNOSIS — D64.9 ANEMIA, UNSPECIFIED TYPE: ICD-10-CM

## 2019-10-31 LAB
ERYTHROCYTE [DISTWIDTH] IN BLOOD BY AUTOMATED COUNT: 13.5 % (ref 10–15)
HCT VFR BLD AUTO: 33.9 % (ref 35–47)
HGB BLD-MCNC: 11.2 G/DL (ref 11.7–15.7)
MCH RBC QN AUTO: 29.3 PG (ref 26.5–33)
MCHC RBC AUTO-ENTMCNC: 33 G/DL (ref 31.5–36.5)
MCV RBC AUTO: 89 FL (ref 78–100)
PLATELET # BLD AUTO: 229 10E9/L (ref 150–450)
RBC # BLD AUTO: 3.82 10E12/L (ref 3.8–5.2)
WBC # BLD AUTO: 5.6 10E9/L (ref 4–11)

## 2019-10-31 PROCEDURE — 80048 BASIC METABOLIC PNL TOTAL CA: CPT | Performed by: INTERNAL MEDICINE

## 2019-10-31 PROCEDURE — G0008 ADMIN INFLUENZA VIRUS VAC: HCPCS | Performed by: INTERNAL MEDICINE

## 2019-10-31 PROCEDURE — 85027 COMPLETE CBC AUTOMATED: CPT | Performed by: INTERNAL MEDICINE

## 2019-10-31 PROCEDURE — 82728 ASSAY OF FERRITIN: CPT | Performed by: INTERNAL MEDICINE

## 2019-10-31 PROCEDURE — 82306 VITAMIN D 25 HYDROXY: CPT | Performed by: INTERNAL MEDICINE

## 2019-10-31 PROCEDURE — 82607 VITAMIN B-12: CPT | Performed by: INTERNAL MEDICINE

## 2019-10-31 PROCEDURE — 36415 COLL VENOUS BLD VENIPUNCTURE: CPT | Performed by: INTERNAL MEDICINE

## 2019-10-31 PROCEDURE — 99214 OFFICE O/P EST MOD 30 MIN: CPT | Mod: 25 | Performed by: INTERNAL MEDICINE

## 2019-10-31 PROCEDURE — 90662 IIV NO PRSV INCREASED AG IM: CPT | Performed by: INTERNAL MEDICINE

## 2019-10-31 ASSESSMENT — MIFFLIN-ST. JEOR: SCORE: 1298.74

## 2019-10-31 NOTE — LETTER
[place] [address] [facility]  [address]  [place]  [address]  Tel: [phone]    November 6, 2019    Carmen Bonner  [facility]  [facility] [address]        Dear MsSierra Ha,    This is to inform you regarding your test result.     I spoke to you on phone but sending you a result note also.  Your Vitamin D level is low.  Take vit d 50,000 i.u weekly for 12 weeks .  After that  take Vit D 2000 i.u daily indefinitely.  Recheck Vit D in 3-4 months.  You are anemic and your hemoglobin is low .  Numbers are stable  Usually anemia requires work up including colonoscopy and endoscopy.  You have chosen not to pursue that as you say it runs in your family.  So will check it again in 3 months  Ferritin which is iron stores in the body is normal.  Vitamin B 12 level is normal.  Sodium is slightly low will check again in 3 months    Sincerely,      Dr.Nasima Tonya MD,FACP/SML      Enclosure: Lab Results  Results for orders placed or performed in visit on 10/31/19   CBC with platelets     Status: Abnormal   Result Value Ref Range    WBC 5.6 4.0 - 11.0 10e9/L    RBC Count 3.82 3.8 - 5.2 10e12/L    Hemoglobin 11.2 (L) 11.7 - 15.7 g/dL    Hematocrit 33.9 (L) 35.0 - 47.0 %    MCV 89 78 - 100 fl    MCH 29.3 26.5 - 33.0 pg    MCHC 33.0 31.5 - 36.5 g/dL    RDW 13.5 10.0 - 15.0 %    Platelet Count 229 150 - 450 10e9/L   Ferritin     Status: None   Result Value Ref Range    Ferritin 72 8 - 252 ng/mL   Vitamin B12     Status: None   Result Value Ref Range    Vitamin B12 539 193 - 986 pg/mL   Vitamin D Deficiency     Status: Abnormal   Result Value Ref Range    Vitamin D Deficiency screening 19 (L) 20 - 75 ug/L   Basic metabolic panel     Status: Abnormal   Result Value Ref Range    Sodium 131 (L) 133 - 144 mmol/L    Potassium 4.1 3.4 - 5.3 mmol/L    Chloride 98 94 - 109 mmol/L    Carbon Dioxide 27 20 - 32 mmol/L    Anion Gap 6 3 - 14 mmol/L    Glucose 85 70 - 99 mg/dL    Urea Nitrogen 20 7 - 30 mg/dL     Creatinine 0.79 0.52 - 1.04 mg/dL    GFR Estimate 72 >60 mL/min/[1.73_m2]    GFR Estimate If Black 84 >60 mL/min/[1.73_m2]    Calcium 9.2 8.5 - 10.1 mg/dL

## 2019-10-31 NOTE — PATIENT INSTRUCTIONS
Labs today  Monitor your blood pressure once a week at home.  Bring those readings on your next visit.  Notify us if your blood pressure readings consistently stays greater than 140/90.    Take iron pills 2 or 3 times a week is fine    Follow-up in 2 months  Seek sooner medical attention if there is any worsening of symptoms or problems    Patient Education     Lowering Your Blood Pressure with DASH  What is the DASH eating plan?  DASH (Dietary Approaches to Stop Hypertension) can help you prevent or lower high blood pressure. This eating plan provides the nutrients that help lower blood pressure: potassium, magnesium, calcium, protein and fiber.   If not controlled, high blood pressure may lead to heart disease, stroke or blindness.  This eating plan is rich in:     Fruits and vegetables    Whole grains    Fat-free or low-fat milk products    Fish and poultry (chicken, turkey, etc.)    Beans, seeds and nuts.  This eating plan is low in:     Salt and sodium    Sugar, sweets and drinks with sugar    Red meat, saturated fat and trans fat.  Lifestyle changes  Besides a healthy eating plan, other steps are needed to help control high blood pressure.     Stay at a healthy weight.    Be active for at least 30 minutes on most days.    If you drink alcohol, have no more than two drinks per day (for men) or one per day (for women).    If you take blood pressure medicine, take it as directed.  Losing weight with DASH  You can lose weight by eating fewer calories. It is best to take off pounds slowly over time. Talk to a dietitian about the best way to do this.  Staying active   To shed pounds, combine DASH with daily exercise. Start with a 15-minute walk each day. Slowly increase the time until you reach a total of 30 minutes on most days. To avoid weight gain, try for 60 minutes each day. Check with your doctor before starting any exercise program.  Tips for less sodium    Avoid processed foods. These may include baked  goods, cereals, soy sauce and even antacids.    Cook with less salt. Don't bring the saltshaker to the table.    Season food with herbs, spices, lemon, lime, vinegar, wine and salt-free seasoning blends.    Use low-sodium canned vegetables or fruits.  Tips to ease the change  Take a week or two to slowly make changes to your diet.    Add a serving of vegetables at lunch one day. The next day, add a serving at dinner as well.    Add fruit to one meal or eat it as a snack.    Work up to three servings of fat-free and low-fat milk products each day.    If you eat large portions of meat, cut back by a third at each meal. The goal is to eat 6 oz (ounces) of meat or less per day.    Serve brown rice and whole-wheat bread and pasta.    Try casseroles and stir-villanueva dishes that have less meat and more vegetables, grains or cooked dry beans.    Serve two or more meatless meals each week.    For snacks and desserts, eat foods low in fat, sodium and sugar, such as:  ? Unsalted rice cakes, nuts or seeds  ? Fresh fruits, raw vegetables and raisins  ? Fat-free, low-fat or frozen yogurt  ? Popcorn with no salt or butter.    If you have trouble digesting milk products, try lactose-free milk or take lactase pills.    If you have a nut allergy, eat seeds, beans, lentils or split peas.    Fruits, vegetables and whole grain foods are high in fiber. To avoid bloating and diarrhea (loose, watery stools), increase these foods over several weeks.  The DASH eating plan  Use this chart to plan your meals. Or take it with you when you shop for food.   Food Group Servings per Day  Serving Size Examples    1,600 Calories 2,000 Calories 2,600 Calories      Grains  (whole wheat) 6 6 to 8 10 to 11   1 slice bread    1 oz dry cereal      cup cooked rice, pasta or cereal Whole-wheat bread and rolls, whole-wheat pasta, English muffin, neftaly bread, bagel, cereals, grits, oatmeal, brown rice, unsalted pretzels and popcorn   Vegetables  3 to 4 4 to 5 5 to  6   1 cup raw leafy vegetables      cup cut-up raw or cooked vegetable      cup vegetable juice Broccoli, carrots, collards, green beans, green peas, kale, lima beans, potatoes, spinach, squash, sweet potatoes, tomatoes   Fruits 4 4 to 5 5 to 6   1 medium fruit      cup dried fruit      cup fresh, frozen, or canned fruit      cup fruit juice Apples, apricots, bananas, dates, grapes, oranges, grapefruit, mangoes, melons, peaches, pineapples, raisins, strawberries, tangerines   Fat-free or   low-fat milk and milk products 2 to 3 2 to 3 3   1 cup milk or yogurt    1   oz cheese Fat-free or low-fat (1%) milk, buttermilk, cheese, regular or frozen yogurt   Lean meats, poultry and fish 3 to 6 6 or less 6   1 oz cooked meats, poultry (chicken, turkey) or fish    1 egg    2 egg whites Lean meats (trim away any fat, then broil, roast or poach); remove skin from poultry. Eggs are high in cholesterol, so limit egg yolks to four or less per week.   Nuts, seeds   and legumes 3 per week 4 to 5 per week 1 per day   ? cup (1   oz) nuts    2 Tbsp peanut butter    2 Tbsp (   oz) seeds      cup cooked legumes (dry beans and peas) Almonds, hazelnuts, mixed nuts, peanuts, walnuts, sunflower seeds, peanut butter, kidney beans, lentils, split peas   Fats and oils 2 2 to 3 3   1 tsp soft margarine    1 tsp vegetable oil    1 Tbsp mcguire    2 Tbsp salad dressing Soft margarine, vegetable oil (such as canola, corn, olive or safflower), low-fat mayonnaise, light salad dressing   Sweets and   added sugars 0 5 or less per week 2 or less per day   1 Tbsp sugar, jelly or jam      cup sorbet or gelatin    1 cup lemonade Fruit-flavored gelatin, fruit punch, hard candy, jelly, maple syrup, sorbets and ices   A sample meal plan  This sample menu gives two sodium levels. Start with 2,300 mg of sodium (about 1 teaspoon of table salt per day). Then, try to lower your intake to 1,500 mg a day. Talk to your doctor or dietitian about how to do this.   These  "samples are for people who eat 2,000 calories per day. The less salt you eat, the more you may lower your blood pressure.   Menu for 2,300 mg sodium per day   Breakfast:   cup instant oatmeal, 1 mini whole-wheat bagel, 1 tablespoon peanut butter, 1 medium banana, 1 cup (8 ounces) low-fat milk.   Lunch: 1 cup cantaloupe chunks, 1 cup apple juice and one chicken breast sandwich that includes:    Two slices (3 ounces) chicken breast, no skin    Two slices whole-wheat bread    1 slice (   ounce) reduced-fat cheddar cheese    One leaf blanca lettuce    Two slices tomato    1 tablespoon low-fat mayonnaise.  Dinner: 1 cup cooked spaghetti,   cup low-salt vegetarian spaghetti sauce, 3 tablespoons Parmesan cheese;   cup corn (from frozen);   cup canned pears in juice; one spinach salad that includes:    1 cup fresh spinach leaves      cup fresh carrots, grated      cup fresh mushrooms, sliced    1 tablespoon vinegar and oil dressing.  Snacks:? cup unsalted almonds;   cup dried apricots; 1 cup fat-free fruit yogurt, no sugar added.  Reducing to 1,500 mg sodium per day  Use the same menu plan, but:    For breakfast, replace instant oatmeal with regular oatmeal and 1 teaspoon cinnamon.    For lunch, replace cheddar cheese with low-sodium Swiss cheese.  Resources on diet and health  [employer]  [employer] [facility] Information [facility]  [address] [address]   [place] [address]   Phone: [phone]   TTY: [phone]   www.nhlbi.nih.gov   \"Aim for a Healthy Weight\"   www.nhlbi.nih.gov/health/public/heart/obesity/lose_wt/index.htm  \"Dietary Guidelines for Americans 2005\"   and \"A Healthier You\"   www.healthierus.gov/dietaryguidelines  For informational purposes only. Not to replace the advice of your health care provider. Adapted from \"Your Guide to Lowering Blood Pressure with DASH,\" by the [employer],   NIH Publication No. , revised April 2006. Available at " www.nhlbi.nih.gov/health/public/heart/hbp/dash/index.htm. Published by [employer]. [employer] [id number] - REV 09/15.  For informational purposes only. Not to replace the advice of your health care provider.  Copyright   2018 [employer]. All rights reserved.

## 2019-10-31 NOTE — PROGRESS NOTES
"Subjective     Carmen Bonner is a 73 year old female who presents to clinic today for the following health issues:    HPI   Hypertension Follow-up      Do you check your blood pressure regularly outside of the clinic? Yes  - few times     Are you following a low salt diet? No- moderately     Are your blood pressures ever more than 140 on the top number (systolic) OR more   than 90 on the bottom number (diastolic), for example 140/90? Yes    How many servings of fruits and vegetables do you eat daily?  2-3    On average, how many sweetened beverages do you drink each day (soda, juice, sweet tea, etc)?   0    How many days per week do you miss taking your medication? 0    Patient is here for hypertension follow-up   Does not check BP at home  Notes it makes her feel discouraged when she checks it  Patient knows what she needs to do  States she just needs to do it    Back pain  Doing PT daily  Feels she is able to move upper body more easily now  She can see the benefit    Depression  On Seroquel currently  Energy has been okay  Notes she has sylvie very fatigued  Notes she is more energized with enough sleep  Now she can sleep in  So when she wakes up she feels more energized  Also noticed some improvement with PT exercises  Has seen multiple psychiatrists in past  Has tried other medications before  Per patient she experienced some balance issues with these medications  States she was \"stumbling around\"    Medications and Labs reviewed in EPIC    Reviewed and updated as needed this visit by Provider         Review of Systems   ROS COMP: Constitutional, HEENT, cardiovascular, pulmonary, GI, , musculoskeletal, neuro, skin, endocrine and psych systems are negative, except as otherwise noted.    POSITIVE for fatigue    This document serves as a record of the services and decisions personally performed and made by Maribell Castaneda MD. It was created on her behalf by Jane Still, a trained medical scribe. The creation of this " "document is based on the provider's statements to the medical scribe.  Jane Still 2:52 PM October 31, 2019        Objective    BP (!) 140/70   Pulse 72   Temp 97.1  F (36.2  C) (Oral)   Ht 1.575 m (5' 2\")   Wt 85 kg (187 lb 8 oz)   SpO2 98%   BMI 34.29 kg/m    Body mass index is 34.29 kg/m .  Physical Exam   GENERAL: overweight, alert and no distress  PSYCH: mentation appears normal, affect normal/bright    Diagnostic Test Results:  Labs reviewed in Epic  Results for orders placed or performed in visit on 10/31/19 (from the past 24 hour(s))   CBC with platelets   Result Value Ref Range    WBC 5.6 4.0 - 11.0 10e9/L    RBC Count 3.82 3.8 - 5.2 10e12/L    Hemoglobin 11.2 (L) 11.7 - 15.7 g/dL    Hematocrit 33.9 (L) 35.0 - 47.0 %    MCV 89 78 - 100 fl    MCH 29.3 26.5 - 33.0 pg    MCHC 33.0 31.5 - 36.5 g/dL    RDW 13.5 10.0 - 15.0 %    Platelet Count 229 150 - 450 10e9/L         Reviewed and discussed CBC done on 08/29/2019  Reviewed and discussed ferritin done on 08/29/2019      Assessment & Plan   Carmen was seen today for hypertension.    Diagnoses and all orders for this visit:    Essential hypertension  BP elevated today (153/70)  Does not check BP at home  Information about DASH diet provided today  Explained at home readings are more reliable  Explained BP is influenced by genetics as well  BP rechecked in office and was 140/70  Will have her try lifestyle changes first  Monitor your blood pressure once a week at home.  Bring those readings on your next visit.  Notify us if your blood pressure readings consistently stays greater than 140/90.  If BP stays high, will increase medication  We also advised regular daily exercise as she is deconditioned   Can start doing this slowly to build up endurance  -     Basic metabolic panel  Patient is not physically active   She is deconditioned   Counseling done to encourage her to do physical activity.    Anemia, unspecified type  Patient complains of fatigue  Last CBC and " "ferritin were low  This could be contributing  We explained this to her  Also explained low vitamin B12 can cause fatigue  Will check these labs today  -     CBC with platelets  -     Ferritin  -     Vitamin B12    Need for prophylactic vaccination and inoculation against influenza  Flu shot administered in office today  -     INFLUENZA (HIGH DOSE) 3 VALENT VACCINE [98712]  -     ADMIN INFLUENZA (For MEDICARE Patients ONLY) []    Vitamin D deficiency  -     Vitamin D Deficiency    BMI:   Estimated body mass index is 34.29 kg/m  as calculated from the following:    Height as of this encounter: 1.575 m (5' 2\").    Weight as of this encounter: 85 kg (187 lb 8 oz).   Weight management plan: Discussed healthy diet and exercise guidelines    Patient Instructions     Labs today  Monitor your blood pressure once a week at home.  Bring those readings on your next visit.  Notify us if your blood pressure readings consistently stays greater than 140/90.    Take iron pills 2 or 3 times a week is fine    Follow-up in 2 months  Seek sooner medical attention if there is any worsening of symptoms or problems    The information in this document, created by the medical scribe for me, accurately reflects the services I personally performed and the decisions made by me. I have reviewed and approved this document for accuracy prior to leaving the patient care area.  October 31, 2019 3:09 PM    Maribell Castaneda MD  [facility]          "

## 2019-11-01 LAB
ANION GAP SERPL CALCULATED.3IONS-SCNC: 6 MMOL/L (ref 3–14)
BUN SERPL-MCNC: 20 MG/DL (ref 7–30)
CALCIUM SERPL-MCNC: 9.2 MG/DL (ref 8.5–10.1)
CHLORIDE SERPL-SCNC: 98 MMOL/L (ref 94–109)
CO2 SERPL-SCNC: 27 MMOL/L (ref 20–32)
CREAT SERPL-MCNC: 0.79 MG/DL (ref 0.52–1.04)
FERRITIN SERPL-MCNC: 72 NG/ML (ref 8–252)
GFR SERPL CREATININE-BSD FRML MDRD: 72 ML/MIN/{1.73_M2}
GLUCOSE SERPL-MCNC: 85 MG/DL (ref 70–99)
POTASSIUM SERPL-SCNC: 4.1 MMOL/L (ref 3.4–5.3)
SODIUM SERPL-SCNC: 131 MMOL/L (ref 133–144)
VIT B12 SERPL-MCNC: 539 PG/ML (ref 193–986)

## 2019-11-04 LAB — DEPRECATED CALCIDIOL+CALCIFEROL SERPL-MC: 19 UG/L (ref 20–75)

## 2019-11-05 ENCOUNTER — THERAPY VISIT (OUTPATIENT)
Dept: PHYSICAL THERAPY | Facility: CLINIC | Age: 74
End: 2019-11-05
Payer: MEDICARE

## 2019-11-05 DIAGNOSIS — Z13.29 SCREENING FOR THYROID DISORDER: ICD-10-CM

## 2019-11-05 DIAGNOSIS — M25.60 JOINT STIFFNESS: ICD-10-CM

## 2019-11-05 DIAGNOSIS — D64.9 ANEMIA, UNSPECIFIED TYPE: ICD-10-CM

## 2019-11-05 DIAGNOSIS — E55.9 VITAMIN D DEFICIENCY: Primary | ICD-10-CM

## 2019-11-05 DIAGNOSIS — E87.1 HYPONATREMIA: ICD-10-CM

## 2019-11-05 DIAGNOSIS — R53.81 PHYSICAL DECONDITIONING: ICD-10-CM

## 2019-11-05 DIAGNOSIS — M54.6 ACUTE MIDLINE THORACIC BACK PAIN: Primary | ICD-10-CM

## 2019-11-05 DIAGNOSIS — M54.9 UPPER BACK PAIN: ICD-10-CM

## 2019-11-05 PROCEDURE — 97112 NEUROMUSCULAR REEDUCATION: CPT | Mod: GP | Performed by: PHYSICAL THERAPIST

## 2019-11-05 PROCEDURE — 97110 THERAPEUTIC EXERCISES: CPT | Mod: GP | Performed by: PHYSICAL THERAPIST

## 2019-11-05 PROCEDURE — 97140 MANUAL THERAPY 1/> REGIONS: CPT | Mod: GP | Performed by: PHYSICAL THERAPIST

## 2019-11-05 RX ORDER — ERGOCALCIFEROL 1.25 MG/1
50000 CAPSULE, LIQUID FILLED ORAL WEEKLY
Qty: 12 CAPSULE | Refills: 0 | Status: SHIPPED | OUTPATIENT
Start: 2019-11-05 | End: 2020-02-07

## 2019-11-06 NOTE — RESULT ENCOUNTER NOTE
Please notify patient by sending following letter with copy of test results      Mary Morales,    This is to inform you regarding your test result.    I spoke to you on phone but sending you a result note also.  Your Vitamin D level is low.  Take vit d 50,000 i.u weekly for 12 weeks .  After that  take Vit D 2000 i.u daily indefinitely.  Recheck Vit D in 3-4 months.  You are anemic and your hemoglobin is low .  Numbers are stable  Usually anemia requires work up including colonoscopy and endoscopy.  You have chosen not to pursue that as you say it runs in your family.  So will check it again in 3 months  Ferritin which is iron stores in the body is normal.  Vitamin B 12 level is normal.  Sodium is slightly low will check again in 3 months    Sincerely,      Dr.Nasima Tonya MD,FACP

## 2019-11-12 ENCOUNTER — THERAPY VISIT (OUTPATIENT)
Dept: PHYSICAL THERAPY | Facility: CLINIC | Age: 74
End: 2019-11-12
Payer: MEDICARE

## 2019-11-12 DIAGNOSIS — R53.81 PHYSICAL DECONDITIONING: ICD-10-CM

## 2019-11-12 DIAGNOSIS — M54.9 UPPER BACK PAIN: ICD-10-CM

## 2019-11-12 DIAGNOSIS — M54.6 ACUTE MIDLINE THORACIC BACK PAIN: Primary | ICD-10-CM

## 2019-11-12 DIAGNOSIS — M25.60 JOINT STIFFNESS: ICD-10-CM

## 2019-11-12 PROCEDURE — 97110 THERAPEUTIC EXERCISES: CPT | Mod: GP | Performed by: PHYSICAL THERAPIST

## 2019-11-12 PROCEDURE — 97112 NEUROMUSCULAR REEDUCATION: CPT | Mod: GP | Performed by: PHYSICAL THERAPIST

## 2019-11-17 DIAGNOSIS — M54.9 UPPER BACK PAIN: ICD-10-CM

## 2019-11-18 NOTE — TELEPHONE ENCOUNTER
tiZANidine (ZANAFLEX) 2 MG tablet 120 tablet 3 7/23/2019         Last Written Prescription Date:  7/23/2019  Last Fill Quantity: 120,   # refills: 3  Last Office Visit: 10/31/2019  Future Office visit:    Next 5 appointments (look out 90 days)    Dec 23, 2019  3:00 PM CST  [facility] [address] [facility] [address]  [phone]           Routing refill request to provider for review/approval because:  Drug not on the [facility], [facility] or  Health refill protocol or controlled substance

## 2019-11-19 RX ORDER — TIZANIDINE 2 MG/1
4 TABLET ORAL 2 TIMES DAILY PRN
Refills: 0
Start: 2019-11-19

## 2019-12-03 ENCOUNTER — THERAPY VISIT (OUTPATIENT)
Dept: PHYSICAL THERAPY | Facility: CLINIC | Age: 74
End: 2019-12-03
Payer: MEDICARE

## 2019-12-03 DIAGNOSIS — M25.60 JOINT STIFFNESS: ICD-10-CM

## 2019-12-03 DIAGNOSIS — R53.81 PHYSICAL DECONDITIONING: ICD-10-CM

## 2019-12-03 DIAGNOSIS — M54.9 UPPER BACK PAIN: ICD-10-CM

## 2019-12-03 DIAGNOSIS — M54.6 ACUTE MIDLINE THORACIC BACK PAIN: Primary | ICD-10-CM

## 2019-12-03 PROCEDURE — 97110 THERAPEUTIC EXERCISES: CPT | Mod: GP | Performed by: PHYSICAL THERAPIST

## 2019-12-03 PROCEDURE — 97112 NEUROMUSCULAR REEDUCATION: CPT | Mod: GP | Performed by: PHYSICAL THERAPIST

## 2019-12-03 NOTE — PROGRESS NOTES
Subjective:  HPI                    Objective:  System    Physical Exam    General     ROS    Assessment/Plan:    PROGRESS  REPORT    Progress reporting period is to 12/3/19.       SUBJECTIVE  Subjective changes noted by patient:  .  Subjective: Patient continues to note improvement in function, mobility, and strength.  Has some questions regarding performance of some exercises.  Is able to change position in bed and transfer from sit to stand more readily.  Is still noting some bilateral knee pain that she is managing.  Also has started to note some left lateral ankle pain.    Current pain level is   .     Previous pain level was   Initial Pain level: 1/10(Markedly worse with transfers.).   Changes in function:  Yes (See Goal flowsheet attached for changes in current functional level)  Adverse reaction to treatment or activity: None    OBJECTIVE  Changes noted in objective findings:    Objective: Screen of ankle pain reveals reproduction of pain with passive plantarflexion and inversion.  Is tender over peroneal tendons at lateral ankle.  Patient given some gentle stretching to address tenosynovitis of peroneals.  Discussed and reviewed exercises.  Change lower extremity strengthening to be more easily performed at home.  Patient would like to work on current home exercise program and follow-up in 4 weeks.  Likely then will be ready for discharge with home exercise program.     ASSESSMENT/PLAN  Updated problem list and treatment plan: Diagnosis 1:  Low back and thoracic pain/stiffness/deconditioning    Decreased ROM/flexibility - manual therapy and therapeutic exercise  Decreased joint mobility - manual therapy and therapeutic exercise  Decreased strength - therapeutic exercise and therapeutic activities  Impaired muscle performance - neuro re-education  Decreased function - therapeutic activities  Impaired posture - neuro re-education  STG/LTGs have been met or progress has been made towards goals:  Yes (See Goal  flow sheet completed today.)  Assessment of Progress: The patient's condition is improving.  Self Management Plans:  Patient has been instructed in a home treatment program.  Patient  has been instructed in self management of symptoms.    Carmen continues to require the following intervention to meet STG and LTG's:  PT    Recommendations:  This patient would benefit from continued therapy.     Frequency:  1 X week, once daily  Duration:  for 2 weeks        Please refer to the daily flowsheet for treatment today, total treatment time and time spent performing 1:1 timed codes.

## 2019-12-23 ENCOUNTER — OFFICE VISIT (OUTPATIENT)
Dept: FAMILY MEDICINE | Facility: CLINIC | Age: 74
End: 2019-12-23
Payer: MEDICARE

## 2019-12-23 VITALS
DIASTOLIC BLOOD PRESSURE: 68 MMHG | HEIGHT: 62 IN | HEART RATE: 74 BPM | BODY MASS INDEX: 34.6 KG/M2 | OXYGEN SATURATION: 98 % | SYSTOLIC BLOOD PRESSURE: 151 MMHG | TEMPERATURE: 98.5 F | WEIGHT: 188 LBS

## 2019-12-23 DIAGNOSIS — E55.9 VITAMIN D DEFICIENCY: ICD-10-CM

## 2019-12-23 DIAGNOSIS — R00.2 PALPITATIONS: ICD-10-CM

## 2019-12-23 DIAGNOSIS — F51.01 PRIMARY INSOMNIA: Primary | ICD-10-CM

## 2019-12-23 DIAGNOSIS — I10 BENIGN ESSENTIAL HYPERTENSION: ICD-10-CM

## 2019-12-23 PROCEDURE — 99214 OFFICE O/P EST MOD 30 MIN: CPT | Performed by: INTERNAL MEDICINE

## 2019-12-23 RX ORDER — QUETIAPINE FUMARATE 100 MG/1
TABLET, FILM COATED ORAL
Qty: 45 TABLET | Refills: 3 | Status: SHIPPED | OUTPATIENT
Start: 2019-12-23 | End: 2020-07-23

## 2019-12-23 RX ORDER — AMLODIPINE BESYLATE 5 MG/1
5 TABLET ORAL 2 TIMES DAILY
Qty: 180 TABLET | Refills: 3 | Status: SHIPPED | OUTPATIENT
Start: 2019-12-23 | End: 2020-07-23

## 2019-12-23 ASSESSMENT — MIFFLIN-ST. JEOR: SCORE: 1296.01

## 2019-12-23 NOTE — PROGRESS NOTES
Subjective     Carmen Bonner is a 74 year old female who presents to clinic today for the following health issues:    HPI   Medication Followup of Vitamin D2    Taking Medication as prescribed: yes    Side Effects:  None    Medication Helping Symptoms:  Notes slight energy increase and slight improvement with depression   Blood pressure follow-up  Patient BP has been over 140 on top readings and less that 90 on the bottom readings.   Taking medications as prescribed, unsure if BP elevation could be related to anxiety    She is taking her blood pressure medication and monitoring blood pressure at home  Most of the readings are little bit on higher side  She is tolerating current medication well  She wants renewal of her Seroquel  She also complains about occasional episodes of palpitation which she was having difficulty describing  She thinks it could be anxiety related      Patient Active Problem List   Diagnosis     BMI 31     CARDIOVASCULAR SCREENING; LDL GOAL LESS THAN 130     HTN, goal below 140/90     Intermittent asthma     GERD (gastroesophageal reflux disease)     Insomnia due to other mental disorder     Arthritis     Lumbar spondylosis     Upper back pain     Hypertension     Pain in thoracic spine     Stool incontinence     Advanced directives, counseling/discussion     Allergic rhinitis     Hyperlipidemia, unspecified hyperlipidemia type     History of paroxysmal supraventricular tachycardia     Generalized anxiety disorder     Anemia, unspecified type     Joint stiffness     Acute midline thoracic back pain     Physical deconditioning     Vitamin D deficiency     Past Surgical History:   Procedure Laterality Date     CHOLECYSTECTOMY       HYSTERECTOMY TOTAL ABDOMINAL         Social History     Tobacco Use     Smoking status: Former Smoker     Packs/day: 1.00     Years: 26.00     Pack years: 26.00     Last attempt to quit:      Years since quittin.9     Smokeless tobacco: Never Used   Substance  Use Topics     Alcohol use: No     Alcohol/week: 0.0 standard drinks     Comment: Attends AA Meetings, sober since 1996     Family History   Problem Relation Age of Onset     Cancer Mother      Osteoporosis Mother      Cerebrovascular Disease Father      Alzheimer Disease Father      Genitourinary Problems Maternal Grandmother      Heart Disease Paternal Grandfather      Osteoporosis Sister      Arthritis Sister      Breast Cancer Paternal Grandmother      Colon Cancer No family hx of          Current Outpatient Medications   Medication Sig Dispense Refill     acetaminophen (ACETAMINOPHEN EXTRA STRENGTH) 500 MG tablet Take 500-1,000 mg by mouth every 6 hours as needed for mild pain       albuterol (PROAIR HFA) 108 (90 Base) MCG/ACT inhaler Inhale 2 puffs into the lungs every 4 hours as needed for shortness of breath / dyspnea or wheezing 8.5 Inhaler 11     amLODIPine (NORVASC) 5 MG tablet Take 1 tablet (5 mg) by mouth 2 times daily 180 tablet 3     Cranberry 140-100-3 MG-MG-UNIT CAPS Take 1 capsule by mouth daily       estradiol (ESTRACE) 0.1 MG/GM vaginal cream Place 2 g vaginally twice a week 42.5 g 1     hydrochlorothiazide (HYDRODIURIL) 25 MG tablet Take 1 tablet (25 mg) by mouth daily 90 tablet 3     lisinopril (PRINIVIL/ZESTRIL) 40 MG tablet Take 1 tablet (40 mg) by mouth daily 90 tablet 3     mometasone (NASONEX) 50 MCG/ACT nasal spray Spray 2 sprays into both nostrils daily 1 Box 3     montelukast (SINGULAIR) 10 MG tablet Take 1 tablet (10 mg) by mouth At Bedtime 90 tablet 3     omeprazole (PRILOSEC) 40 MG DR capsule Take 1 capsule (40 mg) by mouth daily Take 30-60 minutes before a meal. 90 capsule 1     QUEtiapine (SEROQUEL) 100 MG tablet TAKE 1 AND 1/2 TABLETS BY MOUTH ONCE DAILY AT BEDTIME 45 tablet 3     tiZANidine (ZANAFLEX) 2 MG tablet TAKE 2 TABLETS (4 MG) BY MOUTH 2 TIMES DAILY AS NEEDED FOR MUSCLE SPASMS 120 tablet 3     triamcinolone (KENALOG) 0.1 % cream Apply topically 2 times daily 45 g 1      "valACYclovir (VALTREX) 1000 mg tablet Take 1 tablet (1,000 mg) by mouth as needed Herpes Outbreak 12 tablet 1     vitamin D2 (ERGOCALCIFEROL) 68149 units (1250 mcg) capsule Take 1 capsule (50,000 Units) by mouth once a week For 12 weeks after finishing that you take OTC 2000 i.u daily 12 capsule 0       Reviewed and updated as needed this visit by Provider         Review of Systems   ROS COMP: Constitutional, HEENT, cardiovascular, pulmonary, gi and gu systems are negative, except as otherwise noted.      Objective    BP (!) 151/68 (BP Location: Right arm, Patient Position: Sitting, Cuff Size: Adult Large)   Pulse 74   Temp 98.5  F (36.9  C) (Oral)   Ht 1.575 m (5' 2\")   Wt 85.3 kg (188 lb)   SpO2 98%   Breastfeeding No   BMI 34.39 kg/m    Body mass index is 34.39 kg/m .  Physical Exam       GENERAL APPEARANCE: healthy, alert and no distress  EYES: Eyes grossly normal to inspection, PERRL and conjunctivae and sclerae normal  HENT: ear canals and TM's normal and nose and mouth without ulcers or lesions  NECK: no adenopathy  RESP: lungs clear to auscultation - no rales, rhonchi or wheezes  CV: regular rates and rhythm, normal S1 S2, no S3  She had occasional skipped beat on auscultation and palpation of pulse          Diagnostic Test Results:  Labs reviewed in Epic          Assessment & Plan     Carmen was seen today for recheck medication.    Diagnoses and all orders for this visit:    Primary insomnia  -     QUEtiapine (SEROQUEL) 100 MG tablet; TAKE 1 AND 1/2 TABLETS BY MOUTH ONCE DAILY AT BEDTIME    Benign essential hypertension  -     amLODIPine (NORVASC) 5 MG tablet; Take 1 tablet (5 mg) by mouth 2 times daily     She was on 5 mg daily so we increased the dose to twice a day  Continue lisinopril and hydrochlorothiazide    Vitamin D deficiency  She takes vitamin D prescription strength  After finishing that she will take 2000 international unit daily  Future labs ordered    Palpitations  -     Zio Patch " Holter Adult Pediatric Greater than 48 hrs; Future    Discussed about ZIO Patch  Reviewed her stress echo which was done in the past  She will follow-up in a month and labs before the appointment.     Disclaimer: This note consists of symbols derived from keyboarding, dictation and/or voice recognition software. As a result, there may be errors in the script that have gone undetected. Please consider this when interpreting information found in this chart.      Patient Instructions   Schedule labs at the of January 2020.  After you finish your prescription of vit D then go to   OTC vit d 2000 i.u daily.  Schedule zio patch monitor by calling [phone]  The dose of amlodipine is increased to 5 mg twice a day  Follow up in one month  Labs before appointment   Seek sooner medical attention if there is any worsening of symptoms or problems.        Return in about 1 month (around 1/23/2020).    Maribell Castaneda MD  [facility]

## 2019-12-23 NOTE — PATIENT INSTRUCTIONS
Schedule labs at the of January 2020.  After you finish your prescription of vit D then go to   OTC vit d 2000 i.u daily.  Schedule zio patch monitor by calling [phone]  The dose of amlodipine is increased to 5 mg twice a day  Follow up in one month  Labs before appointment   Seek sooner medical attention if there is any worsening of symptoms or problems.

## 2019-12-31 ENCOUNTER — HOSPITAL ENCOUNTER (OUTPATIENT)
Dept: CARDIOLOGY | Facility: CLINIC | Age: 74
Discharge: HOME | End: 2019-12-31
Attending: INTERNAL MEDICINE | Admitting: INTERNAL MEDICINE
Payer: MEDICARE

## 2019-12-31 DIAGNOSIS — R00.2 PALPITATIONS: ICD-10-CM

## 2019-12-31 PROCEDURE — 0298T ZIO PATCH HOLTER ADULT PEDIATRIC GREATER THAN 48 HRS: CPT | Performed by: INTERNAL MEDICINE

## 2019-12-31 PROCEDURE — 0296T ZIO PATCH HOLTER ADULT PEDIATRIC GREATER THAN 48 HRS: CPT

## 2019-12-31 NOTE — LETTER
[place] [address] [facility]  [address]  [place]  [address]  Tel: [phone]    January 20, 2020    Carmen Bonner  [address]  [facility] [address]        Dear Ms. Bonner,    This is to inform you regarding your test result.    Your ZIO Patch monitor results were satisfactory  The average heart rate of 69 bpm  Predominant rhythm was sinus rhythm  You had few episodes of supraventricular tachycardia and premature beats  These are considered benign arrhythmias.    Sincerely,      Dr.Nasima Tonya MD,FACP/SML

## 2020-01-07 ENCOUNTER — THERAPY VISIT (OUTPATIENT)
Dept: PHYSICAL THERAPY | Facility: CLINIC | Age: 75
End: 2020-01-07
Payer: MEDICARE

## 2020-01-07 DIAGNOSIS — M25.60 JOINT STIFFNESS: ICD-10-CM

## 2020-01-07 DIAGNOSIS — M54.9 UPPER BACK PAIN: ICD-10-CM

## 2020-01-07 DIAGNOSIS — M54.6 ACUTE MIDLINE THORACIC BACK PAIN: Primary | ICD-10-CM

## 2020-01-07 DIAGNOSIS — R53.81 PHYSICAL DECONDITIONING: ICD-10-CM

## 2020-01-07 PROCEDURE — 97530 THERAPEUTIC ACTIVITIES: CPT | Mod: GP | Performed by: PHYSICAL THERAPIST

## 2020-01-07 PROCEDURE — 97110 THERAPEUTIC EXERCISES: CPT | Mod: GP | Performed by: PHYSICAL THERAPIST

## 2020-01-07 NOTE — LETTER
DEPARTMENT OF HEALTH AND HUMAN SERVICES  CENTERS FOR MEDICARE & MEDICAID SERVICES    PLAN/UPDATED PLAN OF PROGRESS FOR OUTPATIENT REHABILITATION    PATIENTS NAME:  Carmen Bonner     : 1945    PROVIDER NUMBER:    [id number]    HICN:  [id number]     PROVIDER NAME: [facility]    MEDICAL RECORD NUMBER: [id number]     START OF CARE DATE:  SOC Date: 19   TYPE:  PT    PRIMARY/TREATMENT DIAGNOSIS: (Pertinent Medical Diagnosis)     Acute midline thoracic back pain  Upper back pain  Joint stiffness  Physical deconditioning    VISITS FROM START OF CARE:  Rxs Used: 9      DISCHARGE REPORT  Progress reporting period is from 12/3/19 to 20.       SUBJECTIVE  Subjective changes noted by patient:  .  Subjective: Patient reports her treatment with reports of good ability to continue to follow through with exercise.  Is noting gains in strength, posture, and mobility and transfers.  Will still note bilateral knee pain with transferring from sit to stand.  Feels strength in quads is helping.  Is starting to set goals to lose weight.  Has plans to implement around this goal.  Feels that current exercise program will continue to help her meet those goals.    Current pain level is   .     Previous pain level was   Initial Pain level: 1/10(Markedly worse with transfers.).   Changes in function:  Yes (See Goal flowsheet attached for changes in current functional level)  Adverse reaction to treatment or activity: None    OBJECTIVE  Changes noted in objective findings:    Objective: Patient ambulating with good gait pattern.  Good ability to hold self in good neutral posture without cueing.  Active range of motion of thoracic spine improved in bilateral rotations.  Patient is independent with exercises.  No progression indicated at this time for current program.  Patient appears ready for progression to home exercise program and discharge from [facility].     ASSESSMENT/PLAN  Updated  "problem list and treatment plan: Diagnosis 1:    Low back and thoracic pain/stiffness/deconditioning     Decreased ROM/flexibility - manual therapy and  Carmen Bonner   : 1945-Page 2     therapeutic exercise  Decreased joint mobility - manual therapy and therapeutic exercise  Decreased strength - therapeutic exercise and therapeutic activities  Impaired muscle performance - neuro re-education  Decreased function - therapeutic activities  STG/LTGs have been met or progress has been made towards goals:  Yes (See Goal flow sheet completed today.)  Assessment of Progress: The patient's condition is improving.  Self Management Plans:  Patient is independent in a home treatment program.  Patient is independent in self management of symptoms.    Carmen continues to require the following intervention to meet STG and LTG's:  PT    Recommendations:  This patient is ready to be discharged from therapy and continue their home treatment program.      Caregiver Signature/Credentials _____________________________ Date ________       Treating Provider: Edilia Baum, PT   I have reviewed and certified the need for these services and plan of treatment while under my care.        PHYSICIAN'S SIGNATURE:   _________________________________________  Date___________   Maribell Castaneda MD    Certification period:  Beginning of Cert date period: 19 to  End of Cert period date: 20     Functional Level Progress Report: Please see attached \"Goal Flow sheet for Functional level.\"    ________ Continue Services or       ____X____ DC Services                Service dates: From  SOC Date: 19 date to present                         "

## 2020-01-07 NOTE — PROGRESS NOTES
Subjective:  HPI                    Objective:  System    Physical Exam    General     ROS    Assessment/Plan:    DISCHARGE REPORT    Progress reporting period is from 12/3/19 to 1/7/20.       SUBJECTIVE  Subjective changes noted by patient:  .  Subjective: Patient reports her treatment with reports of good ability to continue to follow through with exercise.  Is noting gains in strength, posture, and mobility and transfers.  Will still note bilateral knee pain with transferring from sit to stand.  Feels strength in quads is helping.  Is starting to set goals to lose weight.  Has plans to implement around this goal.  Feels that current exercise program will continue to help her meet those goals.    Current pain level is   .     Previous pain level was   Initial Pain level: 1/10(Markedly worse with transfers.).   Changes in function:  Yes (See Goal flowsheet attached for changes in current functional level)  Adverse reaction to treatment or activity: None    OBJECTIVE  Changes noted in objective findings:    Objective: Patient ambulating with good gait pattern.  Good ability to hold self in good neutral posture without cueing.  Active range of motion of thoracic spine improved in bilateral rotations.  Patient is independent with exercises.  No progression indicated at this time for current program.  Patient appears ready for progression to home exercise program and discharge from [facility].     ASSESSMENT/PLAN  Updated problem list and treatment plan: Diagnosis 1:    Low back and thoracic pain/stiffness/deconditioning     Decreased ROM/flexibility - manual therapy and therapeutic exercise  Decreased joint mobility - manual therapy and therapeutic exercise  Decreased strength - therapeutic exercise and therapeutic activities  Impaired muscle performance - neuro re-education  Decreased function - therapeutic activities  STG/LTGs have been met or progress has been made towards goals:  Yes (See Goal flow sheet completed  today.)  Assessment of Progress: The patient's condition is improving.  Self Management Plans:  Patient is independent in a home treatment program.  Patient is independent in self management of symptoms.    Carmen continues to require the following intervention to meet STG and LTG's:  PT    Recommendations:  This patient is ready to be discharged from therapy and continue their home treatment program.  Please refer to the daily flowsheet for treatment today, total treatment time and time spent performing 1:1 timed codes.

## 2020-01-20 NOTE — RESULT ENCOUNTER NOTE
Please notify patient by sending following letter with copy of test results      Mary Morales,    This is to inform you regarding your test result.    Your ZIO Patch monitor results were satisfactory  The average heart rate of 69 bpm  Predominant rhythm was sinus rhythm  You had few episodes of supraventricular tachycardia and premature beats  These are considered benign arrhythmias.    Sincerely,      Dr.Nasima Tonya MD,FACP

## 2020-01-27 DIAGNOSIS — E55.9 VITAMIN D DEFICIENCY: ICD-10-CM

## 2020-01-27 DIAGNOSIS — D64.9 ANEMIA, UNSPECIFIED TYPE: ICD-10-CM

## 2020-01-27 DIAGNOSIS — E87.1 HYPONATREMIA: ICD-10-CM

## 2020-01-27 LAB
ERYTHROCYTE [DISTWIDTH] IN BLOOD BY AUTOMATED COUNT: 12.7 % (ref 10–15)
HCT VFR BLD AUTO: 33.9 % (ref 35–47)
HGB BLD-MCNC: 11 G/DL (ref 11.7–15.7)
MCH RBC QN AUTO: 29.1 PG (ref 26.5–33)
MCHC RBC AUTO-ENTMCNC: 32.4 G/DL (ref 31.5–36.5)
MCV RBC AUTO: 90 FL (ref 78–100)
PLATELET # BLD AUTO: 235 10E9/L (ref 150–450)
RBC # BLD AUTO: 3.78 10E12/L (ref 3.8–5.2)
WBC # BLD AUTO: 5.7 10E9/L (ref 4–11)

## 2020-01-27 PROCEDURE — 80048 BASIC METABOLIC PNL TOTAL CA: CPT | Performed by: INTERNAL MEDICINE

## 2020-01-27 PROCEDURE — 36415 COLL VENOUS BLD VENIPUNCTURE: CPT | Performed by: INTERNAL MEDICINE

## 2020-01-27 PROCEDURE — 85027 COMPLETE CBC AUTOMATED: CPT | Performed by: INTERNAL MEDICINE

## 2020-01-27 PROCEDURE — 82306 VITAMIN D 25 HYDROXY: CPT | Performed by: INTERNAL MEDICINE

## 2020-01-27 NOTE — LETTER
[facility]  [address] [facility]  [address]  [place]  [address]  Tel: [phone]    January 30, 2020    Carmen Bonner  [address]  [facility] [address]        Dear Ms. Luevanoon,    Mary Morales,    This is to inform you regarding your test result.    I tried to contact you but got the VM.   I tried both numbers.  Hemoglobin continued to stay on low side.  You are mildly anemic.  Keep your appointment with me as scheduled to discuss this further.  Vit D is better but still on low end of normal.  Continue vit D 2000 I.u daily.  Basic metabolic panel which includes electrolytes and kidney function is satisfactory.  Glucose which is your blood sugar is slightly elevated.  Eat low cholesterol low fat  diet and do regular physical activity. Avoid high sugar containing food.    If you have any further questions or problems, please contact our office.      Sincerely,    Maribell Castaneda MD/ Georgie Hutchinson CMA  Results for orders placed or performed in visit on 01/27/20   Basic metabolic panel     Status: Abnormal   Result Value Ref Range    Sodium 135 133 - 144 mmol/L    Potassium 4.1 3.4 - 5.3 mmol/L    Chloride 101 94 - 109 mmol/L    Carbon Dioxide 26 20 - 32 mmol/L    Anion Gap 8 3 - 14 mmol/L    Glucose 118 (H) 70 - 99 mg/dL    Urea Nitrogen 25 7 - 30 mg/dL    Creatinine 1.03 0.52 - 1.04 mg/dL    GFR Estimate 53 (L) >60 mL/min/[1.73_m2]    GFR Estimate If Black 61 >60 mL/min/[1.73_m2]    Calcium 9.1 8.5 - 10.1 mg/dL   Vitamin D Deficiency     Status: None   Result Value Ref Range    Vitamin D Deficiency screening 21 20 - 75 ug/L   CBC with platelets     Status: Abnormal   Result Value Ref Range    WBC 5.7 4.0 - 11.0 10e9/L    RBC Count 3.78 (L) 3.8 - 5.2 10e12/L    Hemoglobin 11.0 (L) 11.7 - 15.7 g/dL    Hematocrit 33.9 (L) 35.0 - 47.0 %    MCV 90 78 - 100 fl    MCH 29.1 26.5 - 33.0 pg    MCHC 32.4 31.5 - 36.5 g/dL    RDW 12.7 10.0 - 15.0 %    Platelet Count 235 150 - 450 10e9/L                Enclosure: Lab Results

## 2020-01-28 LAB
ANION GAP SERPL CALCULATED.3IONS-SCNC: 8 MMOL/L (ref 3–14)
BUN SERPL-MCNC: 25 MG/DL (ref 7–30)
CALCIUM SERPL-MCNC: 9.1 MG/DL (ref 8.5–10.1)
CHLORIDE SERPL-SCNC: 101 MMOL/L (ref 94–109)
CO2 SERPL-SCNC: 26 MMOL/L (ref 20–32)
CREAT SERPL-MCNC: 1.03 MG/DL (ref 0.52–1.04)
DEPRECATED CALCIDIOL+CALCIFEROL SERPL-MC: 21 UG/L (ref 20–75)
GFR SERPL CREATININE-BSD FRML MDRD: 53 ML/MIN/{1.73_M2}
GLUCOSE SERPL-MCNC: 118 MG/DL (ref 70–99)
POTASSIUM SERPL-SCNC: 4.1 MMOL/L (ref 3.4–5.3)
SODIUM SERPL-SCNC: 135 MMOL/L (ref 133–144)

## 2020-01-29 NOTE — RESULT ENCOUNTER NOTE
Please notify patient by sending following letter with copy of test results      Mary Carmen,    This is to inform you regarding your test result.    I tried to contact you but got the VM.   I tried both numbers.  Hemoglobin continued to stay on low side.  You are mildly anemic.  Keep your appointment with me as scheduled to discuss this further.  Vit D is better but still on low end of normal.  Continue vit D 2000 I.u daily.  Basic metabolic panel which includes electrolytes and kidney function is satisfactory.  Glucose which is your blood sugar is slightly elevated.  Eat low cholesterol low fat  diet and do regular physical activity. Avoid high sugar containing food.        Sincerely,      Dr.Nasima Tonya MD,FACP

## 2020-02-03 ENCOUNTER — OFFICE VISIT (OUTPATIENT)
Dept: FAMILY MEDICINE | Facility: CLINIC | Age: 75
End: 2020-02-03
Payer: MEDICARE

## 2020-02-03 VITALS
WEIGHT: 182 LBS | HEIGHT: 62 IN | OXYGEN SATURATION: 98 % | SYSTOLIC BLOOD PRESSURE: 139 MMHG | TEMPERATURE: 97 F | DIASTOLIC BLOOD PRESSURE: 70 MMHG | BODY MASS INDEX: 33.49 KG/M2 | HEART RATE: 78 BPM

## 2020-02-03 DIAGNOSIS — D64.9 CHRONIC ANEMIA: Primary | ICD-10-CM

## 2020-02-03 DIAGNOSIS — I10 BENIGN ESSENTIAL HYPERTENSION: ICD-10-CM

## 2020-02-03 PROCEDURE — 83021 HEMOGLOBIN CHROMOTOGRAPHY: CPT | Mod: 90 | Performed by: INTERNAL MEDICINE

## 2020-02-03 PROCEDURE — 99213 OFFICE O/P EST LOW 20 MIN: CPT | Performed by: INTERNAL MEDICINE

## 2020-02-03 PROCEDURE — 36415 COLL VENOUS BLD VENIPUNCTURE: CPT | Performed by: INTERNAL MEDICINE

## 2020-02-03 PROCEDURE — 99000 SPECIMEN HANDLING OFFICE-LAB: CPT | Performed by: INTERNAL MEDICINE

## 2020-02-03 ASSESSMENT — MIFFLIN-ST. JEOR: SCORE: 1268.8

## 2020-02-03 NOTE — PATIENT INSTRUCTIONS
Labs today  Monitor your blood pressure once a week  at home.  Bring those readings on your next visit.  Notify us if your blood pressure readings consistently stays greater than 140/90.  There is this is a new shingles vaccine available called shingrex  It is a series of 2 shots 2-6 months apart.  Considered more than 90% effective.  Please go to any pharmacy to get the  vaccine    Follow up in 4 months  Seek sooner medical attention if there is any worsening of symptoms or problems.

## 2020-02-03 NOTE — LETTER
[address] [facility]  [address]  [place]  [address]  Tel: [phone]    February 5, 2020    Carmen Bonner  [address]  [facility] [address]        Dear Ms. Bonner,    Mary Morales,    This is to inform you regarding your test result.    Hemoglobin electrophoresis is normal.    If you have any further questions or problems, please contact our office.      Sincerely,    Maribell Castaneda MD/ Georgie Hutchinson CMA  Results for orders placed or performed in visit on 02/03/20   HGB Eval Reflex to ELP or RBC Solubility     Status: None   Result Value Ref Range    Hemoglobin A1 96.9 95.0 - 97.9 %    Hemoglobin A2 2.8 2.0 - 3.5 %    Hemoglobin F 0.3 0.0 - 2.1 %    Hemoglobin S Eval 0.0 0.0 - 0.0 %    Hemoglobin C 0.0 0.0 - 0.0 %    Hemoglobin E 0.0 0.0 - 0.0 %    Hemoglobin Other 0.0 0.0 - 0.0 %    HGB Abn Evaluation SEE NOTE     Sickle Cell Solubility Confirm Not Performed     Hemoglobin Capillary ELP Not Performed                Enclosure: Lab Results

## 2020-02-03 NOTE — PROGRESS NOTES
"Subjective     Carmen Bonner is a 74 year old female who presents to clinic today for the following health issues:    HPI   Pt is here today for a follow-up    Weight loss  Trying to lose weight  Joining [employer] tomorrow  Compliant with all her blood pressure medications  Is doing all her exercised and PT  Wants to lose weight to get more energy to exercise    Chronic Anemia  Labs from 01/27/2020 show low hemoglobin  Pt states that her hemoglobin is always low  It was especially low when she used to menstruate   Advised to check hemoglobin electrophoresis    Colon Cancer screening  Last colonoscopy was 10 years ago  She declines colonoscopy  She does the FIT test every year    Has a history of mild hematuria      Medications and Labs reviewed in EPIC    Reviewed and updated as needed this visit by Provider         Review of Systems   ROS COMP: Constitutional, HEENT, cardiovascular, pulmonary, GI, , musculoskeletal, neuro, skin, endocrine and psych systems are negative, except as otherwise noted.    This document serves as a record of the services and decisions personally performed and made by Maribell Castaneda MD. It was created on her behalf by Mary Kay Bergman, a trained medical scribe. The creation of this document is based on the provider's statements to the medical scribe.  Mary Kay Bergman 2:36 PM February 3, 2020        Objective    /70   Pulse 78   Temp 97  F (36.1  C) (Oral)   Ht 1.575 m (5' 2\")   Wt 82.6 kg (182 lb)   SpO2 98%   Breastfeeding No   BMI 33.29 kg/m    Body mass index is 33.29 kg/m .  Physical Exam   GENERAL APPEARANCE: Obese, alert and no distress  PSYCH: mentation appears normal, affect normal/bright      Diagnostic Test Results:  Labs reviewed in Epic  No results found for this or any previous visit (from the past 24 hour(s)).      Reviewed and discussed labs done on 01/27/2020    Assessment & Plan     Carmen was seen today for follow up.    Diagnoses and all orders for this " visit:    Chronic anemia for long time she  does not want any EGD or colonoscopy  -     HGB Eval Reflex to ELP or RBC Solubility  Labs from 01/27/2020 showed low hemoglobin  Explained to pt that it is very important to know why hemoglobin is so low  Advised to check hemoglobin electrophoresis  Will do labs today to check this  We told her we will call her with the results   Her ferritin and b12 is satisfactory .      Benign essential hypertension   It is improved after increasing amlodipine  BP elevated today (146/67)  BP rechecked in office and was 139/70  She checked her BP at home once and it was 151/72  BP has improved  Advised to monitor BP readings and bring them to next appointment    Advised pt to get the new shingles vaccine when she can           Patient Instructions   Labs today  Monitor your blood pressure once a week  at home.  Bring those readings on your next visit.  Notify us if your blood pressure readings consistently stays greater than 140/90.  There is this is a new shingles vaccine available called shingrex  It is a series of 2 shots 2-6 months apart.  Considered more than 90% effective.  Please go to any pharmacy to get the  vaccine    Follow up in 4 months  Seek sooner medical attention if there is any worsening of symptoms or problems.          The information in this document, created by the medical scribe for me, accurately reflects the services I personally performed and the decisions made by me. I have reviewed and approved this document for accuracy prior to leaving the patient care area.  February 3, 2020 2:50 PM    Maribell Castaneda MD  [facility]

## 2020-02-04 LAB
HGB A1 MFR BLD: 96.9 % (ref 95–97.9)
HGB A2 MFR BLD: 2.8 % (ref 2–3.5)
HGB C MFR BLD: 0 % (ref 0–0)
HGB E MFR BLD: 0 % (ref 0–0)
HGB F MFR BLD: 0.3 % (ref 0–2.1)
HGB FRACT BLD ELPH-IMP: NORMAL
HGB OTHER MFR BLD: 0 % (ref 0–0)
HGB S BLD QL SOLY: NORMAL
HGB S MFR BLD: 0 % (ref 0–0)
PATH INTERP BLD-IMP: NORMAL

## 2020-02-05 NOTE — RESULT ENCOUNTER NOTE
Please notify patient by sending following letter with copy of test results      Mary Morales,    This is to inform you regarding your test result.    Hemoglobin electrophoresis is normal.    Sincerely,      Dr.Nasima Tonya MD,FACP

## 2020-02-07 DIAGNOSIS — E55.9 VITAMIN D DEFICIENCY: ICD-10-CM

## 2020-02-07 RX ORDER — CHOLECALCIFEROL (VITAMIN D3) 50 MCG
1 TABLET ORAL DAILY
Qty: 90 TABLET | Refills: 3 | Status: SHIPPED | OUTPATIENT
Start: 2020-02-07 | End: 2020-07-23

## 2020-02-07 RX ORDER — ERGOCALCIFEROL 1.25 MG/1
CAPSULE, LIQUID FILLED ORAL
Qty: 12 CAPSULE | Refills: 0 | OUTPATIENT
Start: 2020-02-07

## 2020-02-07 NOTE — TELEPHONE ENCOUNTER
Routing refill request to provider for review/approval because:  Drug not on the [facility], [facility] or  Health refill protocol or controlled substance    Annia Cope RN- Triage [employer]

## 2020-02-07 NOTE — TELEPHONE ENCOUNTER
Last Written Prescription Date:  11/5/19  Last Fill Quantity: 12,  # refills: 0   Last office visit: 2/3/2020 with prescribing provider:     Future Office Visit:    Requested Prescriptions   Pending Prescriptions Disp Refills     vitamin D2 (ERGOCALCIFEROL) 45100 units (1250 mcg) capsule [Pharmacy Med Name: VITAMIN D2 1.25MG(50,000 UNIT)] 12 capsule 0     Sig: TAKE 1 CAPSULE (50,000 UNITS) BY MOUTH ONCE A WEEK FOR 12 WEEKS       There is no refill protocol information for this order

## 2020-02-07 NOTE — TELEPHONE ENCOUNTER
Typically these medications are not given for more than 1 prescription.  If you see the plan from Dr. Castaneda's note in October she had initiated the patient on 12 weeks of ergocalciferol with the intention of stopping this medication after 12 weeks and then starting cholecalciferol, 2000 units daily thereafter with recheck in 3 to 4 months.    I will send in a new prescription to her pharmacy and we can stop that ergocalciferol    Will Link MD, MD

## 2020-02-10 NOTE — TELEPHONE ENCOUNTER
"Pt states the request was made in error, she \"pushed the wrong button\"     She already switched to lower dose Vitamin D     Luh PAGE RN        "

## 2020-07-23 ENCOUNTER — VIRTUAL VISIT (OUTPATIENT)
Dept: FAMILY MEDICINE | Facility: CLINIC | Age: 75
End: 2020-07-23
Payer: MEDICARE

## 2020-07-23 DIAGNOSIS — F51.01 PRIMARY INSOMNIA: ICD-10-CM

## 2020-07-23 DIAGNOSIS — D64.9 CHRONIC ANEMIA: ICD-10-CM

## 2020-07-23 DIAGNOSIS — R10.13 ABDOMINAL PAIN, EPIGASTRIC: Primary | ICD-10-CM

## 2020-07-23 DIAGNOSIS — Z13.29 SCREENING FOR THYROID DISORDER: ICD-10-CM

## 2020-07-23 DIAGNOSIS — E78.5 HYPERLIPIDEMIA, UNSPECIFIED HYPERLIPIDEMIA TYPE: ICD-10-CM

## 2020-07-23 DIAGNOSIS — I10 BENIGN ESSENTIAL HYPERTENSION: ICD-10-CM

## 2020-07-23 PROCEDURE — 99442 ZZC PHYSICIAN TELEPHONE EVALUATION 11-20 MIN: CPT | Performed by: INTERNAL MEDICINE

## 2020-07-23 RX ORDER — OMEPRAZOLE 20 MG/1
20 TABLET, DELAYED RELEASE ORAL DAILY
Qty: 30 TABLET | Refills: 1 | Status: SHIPPED | OUTPATIENT
Start: 2020-07-23 | End: 2020-08-20

## 2020-07-23 RX ORDER — AMLODIPINE BESYLATE 5 MG/1
5 TABLET ORAL 2 TIMES DAILY
Qty: 180 TABLET | Refills: 3 | Status: SHIPPED | OUTPATIENT
Start: 2020-07-23 | End: 2021-06-29

## 2020-07-23 RX ORDER — QUETIAPINE FUMARATE 100 MG/1
TABLET, FILM COATED ORAL
Qty: 45 TABLET | Refills: 3 | Status: SHIPPED | OUTPATIENT
Start: 2020-07-23 | End: 2021-02-04

## 2020-07-23 RX ORDER — LISINOPRIL 40 MG/1
40 TABLET ORAL DAILY
Qty: 90 TABLET | Refills: 3 | Status: SHIPPED | OUTPATIENT
Start: 2020-07-23 | End: 2021-08-04

## 2020-07-23 RX ORDER — HYDROCHLOROTHIAZIDE 25 MG/1
25 TABLET ORAL DAILY
Qty: 90 TABLET | Refills: 3 | Status: SHIPPED | OUTPATIENT
Start: 2020-07-23 | End: 2020-08-20 | Stop reason: SINTOL

## 2020-07-23 NOTE — PATIENT INSTRUCTIONS
Please call [facility] to schedule your ultrasound   [phone]  Schedule labs   Take Prilosec 20 mg daily   Follow up in 2 weeks  Seek sooner medical attention if there is any worsening of symptoms or problems.

## 2020-07-23 NOTE — PROGRESS NOTES
"Carmen Bonner is a 74 year old female who is being evaluated via a billable telephone visit.      The patient has been notified of following:     \"This telephone visit will be conducted via a call between you and your physician/provider. We have found that certain health care needs can be provided without the need for a physical exam.  This service lets us provide the care you need with a short phone conversation.  If a prescription is necessary we can send it directly to your pharmacy.  If lab work is needed we can place an order for that and you can then stop by our lab to have the test done at a later time.    Telephone visits are billed at different rates depending on your insurance coverage. During this emergency period, for some insurers they may be billed the same as an in-person visit.  Please reach out to your insurance provider with any questions.    If during the course of the call the physician/provider feels a telephone visit is not appropriate, you will not be charged for this service.\"    Patient has given verbal consent for Telephone visit?  Yes    What phone number would you like to be contacted at? [phone]    How would you like to obtain your AVS? Mail a copy    Subjective     Carmen Bonner is a 74 year old female who presents via phone visit today for the following health issues:    HPI    Patient is complaining of pain in area between the lower breast and above navel  Denied any nausea or any relationship to the food  Denied any heartburn  Pain is mild  Get worse when she bends down  Going on for about 2 months  No urinary or bowel movement problem but does have a chronic problem with constipation but nothing out of ordinary for her                 Patient Active Problem List   Diagnosis     BMI 31     CARDIOVASCULAR SCREENING; LDL GOAL LESS THAN 130     HTN, goal below 140/90     Intermittent asthma     GERD (gastroesophageal reflux disease)     Insomnia due to other mental disorder     " Arthritis     Lumbar spondylosis     Upper back pain     Hypertension     Pain in thoracic spine     Stool incontinence     Advanced directives, counseling/discussion     Allergic rhinitis     Hyperlipidemia, unspecified hyperlipidemia type     History of paroxysmal supraventricular tachycardia     Generalized anxiety disorder     Anemia, unspecified type     Joint stiffness     Acute midline thoracic back pain     Physical deconditioning     Vitamin D deficiency     Benign essential hypertension     Primary insomnia     Past Surgical History:   Procedure Laterality Date     CHOLECYSTECTOMY       HYSTERECTOMY TOTAL ABDOMINAL         Social History     Tobacco Use     Smoking status: Former Smoker     Packs/day: 1.00     Years: 26.00     Pack years: 26.00     Last attempt to quit:      Years since quittin.5     Smokeless tobacco: Never Used   Substance Use Topics     Alcohol use: No     Alcohol/week: 0.0 standard drinks     Comment: Attends AA Meetings, sober since      Family History   Problem Relation Age of Onset     Cancer Mother      Osteoporosis Mother      Cerebrovascular Disease Father      Alzheimer Disease Father      Genitourinary Problems Maternal Grandmother      Heart Disease Paternal Grandfather      Osteoporosis Sister      Arthritis Sister      Breast Cancer Paternal Grandmother      Colon Cancer No family hx of          Current Outpatient Medications   Medication Sig Dispense Refill     acetaminophen (ACETAMINOPHEN EXTRA STRENGTH) 500 MG tablet Take 500-1,000 mg by mouth every 6 hours as needed for mild pain       albuterol (PROAIR HFA) 108 (90 Base) MCG/ACT inhaler Inhale 2 puffs into the lungs every 4 hours as needed for shortness of breath / dyspnea or wheezing 8.5 Inhaler 11     amLODIPine (NORVASC) 5 MG tablet Take 1 tablet (5 mg) by mouth 2 times daily for Blood Pressure 180 tablet 3     cholecalciferol (VITAMIN D3) 25 mcg (1000 units) capsule Take 1 capsule by mouth daily        Cranberry 140-100-3 MG-MG-UNIT CAPS Take 1 capsule by mouth daily       estradiol (ESTRACE) 0.1 MG/GM vaginal cream Place 2 g vaginally twice a week 42.5 g 1     hydrochlorothiazide (HYDRODIURIL) 25 MG tablet Take 1 tablet (25 mg) by mouth daily for Blood Pressure 90 tablet 3     lisinopril (ZESTRIL) 40 MG tablet Take 1 tablet (40 mg) by mouth daily for Blood Pressure 90 tablet 3     mometasone (NASONEX) 50 MCG/ACT nasal spray Spray 2 sprays into both nostrils daily 1 Box 3     montelukast (SINGULAIR) 10 MG tablet Take 1 tablet (10 mg) by mouth At Bedtime 90 tablet 3     omeprazole (PRILOSEC OTC) 20 MG EC tablet Take 1 tablet (20 mg) by mouth daily 30 tablet 1     QUEtiapine (SEROQUEL) 100 MG tablet TAKE 1 AND 1/2 TABLETS BY MOUTH ONCE DAILY AT BEDTIME 45 tablet 3     tiZANidine (ZANAFLEX) 2 MG tablet TAKE 2 TABLETS (4 MG) BY MOUTH 2 TIMES DAILY AS NEEDED FOR MUSCLE SPASMS 120 tablet 3     triamcinolone (KENALOG) 0.1 % cream Apply topically 2 times daily 45 g 1     UNABLE TO FIND Take 2 tablets by mouth daily MEDICATION NAME: Energy, metabolism buster       valACYclovir (VALTREX) 1000 mg tablet Take 1 tablet (1,000 mg) by mouth as needed Herpes Outbreak 12 tablet 1       Reviewed and updated as needed this visit by Provider         Review of Systems   Constitutional, HEENT, cardiovascular, pulmonary, gi and gu systems are negative, except as otherwise noted.       Objective   Reported vitals:  There were no vitals taken for this visit.   healthy, alert and no distress  PSYCH: Alert and oriented times 3; coherent speech, normal   rate and volume, able to articulate logical thoughts, able   to abstract reason, no tangential thoughts, no hallucinations   or delusions  Her affect is normal  RESP: No cough, no audible wheezing, able to talk in full sentences  Remainder of exam unable to be completed due to telephone visits            Assessment/Plan:    1. Abdominal pain, epigastric  Etiology unclear as it is dull ache  get worse with bending  Does not feel that it is in the muscles she feels it is inside  No relationship with the food, no nausea  Could be constipation causing the pain which she is taking care of  Work-up will be ordered to make sure there is no other reason  May need to bring her in for office visit if needed  - US Abdomen Complete; Future  - Comprehensive metabolic panel; Future  - CBC with platelets; Future  - UA with Microscopic reflex to Culture; Future  - omeprazole (PRILOSEC OTC) 20 MG EC tablet; Take 1 tablet (20 mg) by mouth daily  Dispense: 30 tablet; Refill: 1  Patient is advised if her pain get worse then it is very important she seek attention    2. Benign essential hypertension  Well controlled per patient report all her readings are below 130/80 most of the time  - lisinopril (ZESTRIL) 40 MG tablet; Take 1 tablet (40 mg) by mouth daily for Blood Pressure  Dispense: 90 tablet; Refill: 3  - hydrochlorothiazide (HYDRODIURIL) 25 MG tablet; Take 1 tablet (25 mg) by mouth daily for Blood Pressure  Dispense: 90 tablet; Refill: 3  - amLODIPine (NORVASC) 5 MG tablet; Take 1 tablet (5 mg) by mouth 2 times daily for Blood Pressure  Dispense: 180 tablet; Refill: 3    3. Primary insomnia  Medication helps her  - QUEtiapine (SEROQUEL) 100 MG tablet; TAKE 1 AND 1/2 TABLETS BY MOUTH ONCE DAILY AT BEDTIME  Dispense: 45 tablet; Refill: 3    4. Chronic anemia  She has declined further work-up in the past but will recheck the levels  - CBC with platelets; Future  - Ferritin; Future    5. Screening for thyroid disorder    - TSH with free T4 reflex; Future    6. Hyperlipidemia, unspecified hyperlipidemia type    - Lipid panel reflex to direct LDL Non-fasting; Future    Return in about 2 weeks (around 8/6/2020) for follow up of abdominal pain.      Phone call duration:  11  minutes    Maribell Castaneda MD

## 2020-07-27 ENCOUNTER — HOSPITAL ENCOUNTER (OUTPATIENT)
Dept: ULTRASOUND IMAGING | Facility: CLINIC | Age: 75
Discharge: HOME | End: 2020-07-27
Attending: INTERNAL MEDICINE | Admitting: INTERNAL MEDICINE
Payer: MEDICARE

## 2020-07-27 DIAGNOSIS — R10.13 ABDOMINAL PAIN, EPIGASTRIC: ICD-10-CM

## 2020-07-27 PROCEDURE — 76700 US EXAM ABDOM COMPLETE: CPT

## 2020-07-27 NOTE — LETTER
July 28, 2020      Carmen Bonner  [address]    [facility] [address]        Dear ,    This is to inform you regarding your test result.     Abdominal ultrasound result is normal.     Resulted Orders   US Abdomen Complete    Narrative    ULTRASOUND ABDOMEN COMPLETE 7/27/2020 11:27 AM     HISTORY: Abdominal pain, epigastric.    COMPARISON: None.    FINDINGS:  Liver is unremarkable in echogenicity without focal solid  lesions.  Gallbladder is surgically absent.  Extrahepatic bile duct is  unremarkable in diameter for postcholecystectomy state. Pancreas is  normal where visualized. Spleen is normal. Kidneys are normal in size.  There is no hydronephrosis. Visualized abdominal aorta and IVC are  nonaneurysmal.      Impression    IMPRESSION:  No acute process demonstrated.    LONDON DEJESUS MD       If you have any questions or concerns, please call the clinic at the number listed above.       Sincerely,      Dr.Nasima Tonya MD,FACP

## 2020-07-28 NOTE — RESULT ENCOUNTER NOTE
Please notify patient by sending following letter with copy of test results      Mary Morales,    This is to inform you regarding your test result.    Abdominal ultrasound result is normal.    Sincerely,      Dr.Nasima Tonya MD,FACP

## 2020-07-29 DIAGNOSIS — E78.5 HYPERLIPIDEMIA, UNSPECIFIED HYPERLIPIDEMIA TYPE: ICD-10-CM

## 2020-07-29 DIAGNOSIS — D64.9 CHRONIC ANEMIA: ICD-10-CM

## 2020-07-29 DIAGNOSIS — N39.0 UTI (URINARY TRACT INFECTION), UNCOMPLICATED: Primary | ICD-10-CM

## 2020-07-29 DIAGNOSIS — Z13.29 SCREENING FOR THYROID DISORDER: ICD-10-CM

## 2020-07-29 DIAGNOSIS — R10.13 ABDOMINAL PAIN, EPIGASTRIC: ICD-10-CM

## 2020-07-29 LAB
ALBUMIN UR-MCNC: NEGATIVE MG/DL
APPEARANCE UR: CLEAR
BACTERIA #/AREA URNS HPF: ABNORMAL /HPF
BILIRUB UR QL STRIP: NEGATIVE
COLOR UR AUTO: YELLOW
ERYTHROCYTE [DISTWIDTH] IN BLOOD BY AUTOMATED COUNT: 12.3 % (ref 10–15)
GLUCOSE UR STRIP-MCNC: NEGATIVE MG/DL
HCT VFR BLD AUTO: 32.7 % (ref 35–47)
HGB BLD-MCNC: 11.3 G/DL (ref 11.7–15.7)
HGB UR QL STRIP: ABNORMAL
KETONES UR STRIP-MCNC: NEGATIVE MG/DL
LEUKOCYTE ESTERASE UR QL STRIP: ABNORMAL
MCH RBC QN AUTO: 29.4 PG (ref 26.5–33)
MCHC RBC AUTO-ENTMCNC: 34.6 G/DL (ref 31.5–36.5)
MCV RBC AUTO: 85 FL (ref 78–100)
NITRATE UR QL: NEGATIVE
NON-SQ EPI CELLS #/AREA URNS LPF: ABNORMAL /LPF
PH UR STRIP: 6.5 PH (ref 5–7)
PLATELET # BLD AUTO: 218 10E9/L (ref 150–450)
RBC # BLD AUTO: 3.85 10E12/L (ref 3.8–5.2)
RBC #/AREA URNS AUTO: ABNORMAL /HPF
SOURCE: ABNORMAL
SP GR UR STRIP: 1.02 (ref 1–1.03)
UROBILINOGEN UR STRIP-ACNC: 0.2 EU/DL (ref 0.2–1)
WBC # BLD AUTO: 5.7 10E9/L (ref 4–11)
WBC #/AREA URNS AUTO: ABNORMAL /HPF

## 2020-07-29 PROCEDURE — 84443 ASSAY THYROID STIM HORMONE: CPT | Performed by: INTERNAL MEDICINE

## 2020-07-29 PROCEDURE — 80061 LIPID PANEL: CPT | Performed by: INTERNAL MEDICINE

## 2020-07-29 PROCEDURE — 82728 ASSAY OF FERRITIN: CPT | Performed by: INTERNAL MEDICINE

## 2020-07-29 PROCEDURE — 81001 URINALYSIS AUTO W/SCOPE: CPT | Performed by: INTERNAL MEDICINE

## 2020-07-29 PROCEDURE — 85027 COMPLETE CBC AUTOMATED: CPT | Performed by: INTERNAL MEDICINE

## 2020-07-29 PROCEDURE — 80053 COMPREHEN METABOLIC PANEL: CPT | Performed by: INTERNAL MEDICINE

## 2020-07-29 PROCEDURE — 36415 COLL VENOUS BLD VENIPUNCTURE: CPT | Performed by: INTERNAL MEDICINE

## 2020-07-30 LAB
ALBUMIN SERPL-MCNC: 4 G/DL (ref 3.4–5)
ALP SERPL-CCNC: 67 U/L (ref 40–150)
ALT SERPL W P-5'-P-CCNC: 19 U/L (ref 0–50)
ANION GAP SERPL CALCULATED.3IONS-SCNC: 9 MMOL/L (ref 3–14)
AST SERPL W P-5'-P-CCNC: 15 U/L (ref 0–45)
BILIRUB SERPL-MCNC: 0.4 MG/DL (ref 0.2–1.3)
BUN SERPL-MCNC: 22 MG/DL (ref 7–30)
CALCIUM SERPL-MCNC: 9.2 MG/DL (ref 8.5–10.1)
CHLORIDE SERPL-SCNC: 92 MMOL/L (ref 94–109)
CHOLEST SERPL-MCNC: 230 MG/DL
CO2 SERPL-SCNC: 25 MMOL/L (ref 20–32)
CREAT SERPL-MCNC: 0.88 MG/DL (ref 0.52–1.04)
FERRITIN SERPL-MCNC: 45 NG/ML (ref 8–252)
GFR SERPL CREATININE-BSD FRML MDRD: 64 ML/MIN/{1.73_M2}
GLUCOSE SERPL-MCNC: 97 MG/DL (ref 70–99)
HDLC SERPL-MCNC: 57 MG/DL
LDLC SERPL CALC-MCNC: 149 MG/DL
NONHDLC SERPL-MCNC: 173 MG/DL
POTASSIUM SERPL-SCNC: 4.2 MMOL/L (ref 3.4–5.3)
PROT SERPL-MCNC: 8.2 G/DL (ref 6.8–8.8)
SODIUM SERPL-SCNC: 126 MMOL/L (ref 133–144)
TRIGL SERPL-MCNC: 121 MG/DL
TSH SERPL DL<=0.005 MIU/L-ACNC: 1.54 MU/L (ref 0.4–4)

## 2020-07-30 PROCEDURE — 87086 URINE CULTURE/COLONY COUNT: CPT | Performed by: INTERNAL MEDICINE

## 2020-07-31 LAB
BACTERIA SPEC CULT: NORMAL
SPECIMEN SOURCE: NORMAL

## 2020-08-01 ENCOUNTER — TELEPHONE (OUTPATIENT)
Dept: FAMILY MEDICINE | Facility: CLINIC | Age: 75
End: 2020-08-01

## 2020-08-01 DIAGNOSIS — I10 BENIGN ESSENTIAL HYPERTENSION: Primary | ICD-10-CM

## 2020-08-01 DIAGNOSIS — Z12.11 SCREEN FOR COLON CANCER: ICD-10-CM

## 2020-08-01 NOTE — TELEPHONE ENCOUNTER
I called cell simonad home, lm at home, hold hydrochlorothiazide due to low sodium and follow up with Dr. Castaneda Monday    Guzman Sen M.D.

## 2020-08-02 NOTE — RESULT ENCOUNTER NOTE
Please notify patient by sending following letter with copy of test results      Mary Morales,    This is to inform you regarding your test result.    I spoke to you on phone but sending you a result note also.  Your urine culture is not growing any significant organism.  TSH which is thyroid hormone is normal.  Your total cholesterol is elevated.  HDL which is called good cholesterol is normal.  Your LDL which is called bad cholesterol is elevated.  Eat low cholesterol low fat  diet and do regular physical activity. Avoid high sugar containing food.  Sodium level is low and we should discontinue your hydrochlorothiazide.  Monitor Blood Pressure daily and let me know if Blood Pressure is higher than 140/90  your hemoglobin is low and you are anemic   Ferritin which is iron stores in the body is low.  We want Ferritin level greater than   Take OTC Ferrous Sulfate 325 mg po 3 times a week if you tolerate.  Take with vit C as vit C helps to absorb iron.  Iron can make you constipated so take stool softener.  Recheck ferritin in 4 months  I am glad that your GI symptoms are improving.  Recheck sodium in 2 weeks  Keep your follow up appointment as scheduled .  We usually do endoscopy and colonoscopy for work up of anemia.  But you do not want to do that   Let me know if you change your mind.        Sincerely,      Dr.Nasima Tonya MD,FACP

## 2020-08-07 DIAGNOSIS — I10 BENIGN ESSENTIAL HYPERTENSION: ICD-10-CM

## 2020-08-07 DIAGNOSIS — D64.9 ANEMIA, UNSPECIFIED TYPE: ICD-10-CM

## 2020-08-07 LAB
ERYTHROCYTE [DISTWIDTH] IN BLOOD BY AUTOMATED COUNT: 12.6 % (ref 10–15)
HCT VFR BLD AUTO: 33.7 % (ref 35–47)
HGB BLD-MCNC: 11.3 G/DL (ref 11.7–15.7)
MCH RBC QN AUTO: 28.9 PG (ref 26.5–33)
MCHC RBC AUTO-ENTMCNC: 33.5 G/DL (ref 31.5–36.5)
MCV RBC AUTO: 86 FL (ref 78–100)
PLATELET # BLD AUTO: 249 10E9/L (ref 150–450)
RBC # BLD AUTO: 3.91 10E12/L (ref 3.8–5.2)
WBC # BLD AUTO: 6.1 10E9/L (ref 4–11)

## 2020-08-07 PROCEDURE — 36415 COLL VENOUS BLD VENIPUNCTURE: CPT | Performed by: INTERNAL MEDICINE

## 2020-08-07 PROCEDURE — 85027 COMPLETE CBC AUTOMATED: CPT | Performed by: INTERNAL MEDICINE

## 2020-08-07 PROCEDURE — 80048 BASIC METABOLIC PNL TOTAL CA: CPT | Performed by: INTERNAL MEDICINE

## 2020-08-07 NOTE — LETTER
August 10, 2020      Carmen Bonner  [address]  [facility] [address]          Mary Morales,     This is to inform you regarding your test results:     -Your sodium level is improving.   -Glucose which is your blood sugar is slightly elevated.   -White count is normal.   -Hemoglobin is low but numbers are stable.     Recheck levels in 3 months.       Resulted Orders   Basic metabolic panel   Result Value Ref Range    Sodium 132 (L) 133 - 144 mmol/L    Potassium 4.3 3.4 - 5.3 mmol/L    Chloride 98 94 - 109 mmol/L    Carbon Dioxide 24 20 - 32 mmol/L    Anion Gap 10 3 - 14 mmol/L    Glucose 105 (H) 70 - 99 mg/dL    Urea Nitrogen 18 7 - 30 mg/dL    Creatinine 0.79 0.52 - 1.04 mg/dL    GFR Estimate 73 >60 mL/min/[1.73_m2]      Comment:      Non  GFR Calc  Starting 12/18/2018, serum creatinine based estimated GFR (eGFR) will be   calculated using the Chronic Kidney Disease Epidemiology Collaboration   (CKD-EPI) equation.      GFR Estimate If Black 84 >60 mL/min/[1.73_m2]      Comment:       GFR Calc  Starting 12/18/2018, serum creatinine based estimated GFR (eGFR) will be   calculated using the Chronic Kidney Disease Epidemiology Collaboration   (CKD-EPI) equation.      Calcium 9.2 8.5 - 10.1 mg/dL   CBC with platelets   Result Value Ref Range    WBC 6.1 4.0 - 11.0 10e9/L    RBC Count 3.91 3.8 - 5.2 10e12/L    Hemoglobin 11.3 (L) 11.7 - 15.7 g/dL    Hematocrit 33.7 (L) 35.0 - 47.0 %    MCV 86 78 - 100 fl    MCH 28.9 26.5 - 33.0 pg    MCHC 33.5 31.5 - 36.5 g/dL    RDW 12.6 10.0 - 15.0 %    Platelet Count 249 150 - 450 10e9/L       If you have any questions or concerns, please call the clinic at the number listed above.       Sincerely,       Dr. Maribell Castnaeda MD, FACP

## 2020-08-08 LAB
ANION GAP SERPL CALCULATED.3IONS-SCNC: 10 MMOL/L (ref 3–14)
BUN SERPL-MCNC: 18 MG/DL (ref 7–30)
CALCIUM SERPL-MCNC: 9.2 MG/DL (ref 8.5–10.1)
CHLORIDE SERPL-SCNC: 98 MMOL/L (ref 94–109)
CO2 SERPL-SCNC: 24 MMOL/L (ref 20–32)
CREAT SERPL-MCNC: 0.79 MG/DL (ref 0.52–1.04)
GFR SERPL CREATININE-BSD FRML MDRD: 73 ML/MIN/{1.73_M2}
GLUCOSE SERPL-MCNC: 105 MG/DL (ref 70–99)
POTASSIUM SERPL-SCNC: 4.3 MMOL/L (ref 3.4–5.3)
SODIUM SERPL-SCNC: 132 MMOL/L (ref 133–144)

## 2020-08-10 NOTE — RESULT ENCOUNTER NOTE
Please notify patient by sending following letter with copy of test results      Mary Morales,    This is to inform you regarding your test result.    Your sodium level is improving.  Glucose which is your blood sugar is slightly elevated.  White count is normal.  Hemoglobin is low but numbers are stable.  Recheck levels in 3 months.      Sincerely,      Dr.Nasima Tonya MD,FACP

## 2020-08-14 DIAGNOSIS — Z12.11 SCREEN FOR COLON CANCER: ICD-10-CM

## 2020-08-14 PROCEDURE — 82274 ASSAY TEST FOR BLOOD FECAL: CPT | Performed by: INTERNAL MEDICINE

## 2020-08-14 NOTE — LETTER
August 17, 2020      Carmen Bonner  [address]     [facility] [address]        Dear ,    The following letter pertains to your most recent diagnostic tests:     Good news! Your stool test for colon cancer screening is negative.  This should be repeated in one year.      Resulted Orders   Fecal colorectal cancer screen (FIT)   Result Value Ref Range    Occult Blood Scn FIT Negative NEG^Negative       If you have any questions or concerns, please call the clinic at the number listed above.       Sincerely,        Silvano Dempsey MD

## 2020-08-17 LAB — HEMOCCULT STL QL IA: NEGATIVE

## 2020-08-17 NOTE — RESULT ENCOUNTER NOTE
The following letter pertains to your most recent diagnostic tests:    Good news! Your stool test for colon cancer screening is negative.  This should be repeated in one year.       Sincerely,    Dr. Dempsey

## 2020-08-20 ENCOUNTER — VIRTUAL VISIT (OUTPATIENT)
Dept: FAMILY MEDICINE | Facility: CLINIC | Age: 75
End: 2020-08-20
Payer: MEDICARE

## 2020-08-20 DIAGNOSIS — J45.20 INTERMITTENT ASTHMA, UNCOMPLICATED: ICD-10-CM

## 2020-08-20 DIAGNOSIS — R10.13 ABDOMINAL PAIN, EPIGASTRIC: ICD-10-CM

## 2020-08-20 PROCEDURE — 99441 ZZC PHYSICIAN TELEPHONE EVALUATION 5-10 MIN: CPT | Performed by: INTERNAL MEDICINE

## 2020-08-20 RX ORDER — ALBUTEROL SULFATE 90 UG/1
2 AEROSOL, METERED RESPIRATORY (INHALATION) EVERY 4 HOURS PRN
Qty: 8.5 INHALER | Refills: 11 | Status: SHIPPED | OUTPATIENT
Start: 2020-08-20 | End: 2021-02-04

## 2020-08-20 RX ORDER — OMEPRAZOLE 20 MG/1
20 TABLET, DELAYED RELEASE ORAL DAILY
Qty: 90 TABLET | Refills: 1 | Status: SHIPPED | OUTPATIENT
Start: 2020-08-20 | End: 2020-09-25

## 2020-08-20 RX ORDER — MONTELUKAST SODIUM 10 MG/1
10 TABLET ORAL AT BEDTIME
Qty: 90 TABLET | Refills: 3 | Status: SHIPPED | OUTPATIENT
Start: 2020-08-20 | End: 2021-11-16

## 2020-08-20 NOTE — PROGRESS NOTES
"Carmen Bonner is a 74 year old female who is being evaluated via a billable telephone visit.      The patient has been notified of following:     \"This telephone visit will be conducted via a call between you and your physician/provider. We have found that certain health care needs can be provided without the need for a physical exam.  This service lets us provide the care you need with a short phone conversation.  If a prescription is necessary we can send it directly to your pharmacy.  If lab work is needed we can place an order for that and you can then stop by our lab to have the test done at a later time.    Telephone visits are billed at different rates depending on your insurance coverage. During this emergency period, for some insurers they may be billed the same as an in-person visit.  Please reach out to your insurance provider with any questions.    If during the course of the call the physician/provider feels a telephone visit is not appropriate, you will not be charged for this service.\"    Patient has given verbal consent for Telephone visit?  Yes    What phone number would you like to be contacted at? [phone]    How would you like to obtain your AVS? Mail a copy    Subjective     Carmen Bonner is a 74 year old female who presents via phone visit today for the following health issues:    HPI        Patient mentioned that her epigastric pain resolved completely by taking Prilosec  She had issues with diarrhea in the past but not this time  After she stopped and reused it did not bother her  She is taking her stomach medication and would like to discontinue at some point .  Her sodium was low and with discontinuation of hydrochlorothiazide it improved  She has some swelling in feet and ankle but gets better in the morning  She could not tolerate compression socks in the past but will try it again    Review of Systems   Constitutional, HEENT, cardiovascular, pulmonary, gi and gu systems are " "negative, except as otherwise noted.       Objective          Vitals:  No vitals were obtained today due to virtual visit.    healthy, alert and no distress  PSYCH: Alert and oriented times 3; coherent speech, normal   rate and volume, able to articulate logical thoughts, able   to abstract reason, no tangential thoughts, no hallucinations   or delusions  Her affect is normal  RESP: No cough, no audible wheezing, able to talk in full sentences  Remainder of exam unable to be completed due to telephone visits            Assessment/Plan:    Assessment & Plan     Abdominal pain, epigastric  It has resolved  Most likely it was related to gastritis or acid reflux  Patient is advised that PPI's affect calcium absorption so make sure take adequate calcium with vit D   Advised that she can discontinue omeprazole after using it for another 6 to 8 weeks and then use it as needed  - omeprazole (PRILOSEC OTC) 20 MG EC tablet  Dispense: 90 tablet; Refill: 1    Intermittent asthma, uncomplicated  - albuterol (PROAIR HFA) 108 (90 Base) MCG/ACT inhaler  Dispense: 8.5 Inhaler; Refill: 11  - montelukast (SINGULAIR) 10 MG tablet  Dispense: 90 tablet; Refill: 3  Stable    We also discussed low sodium level related to hydrochlorothiazide so it was discontinued  We discussed compression socks for swelling of the legs as her leg swelling gets better in the morning so most likely it is due to venous insufficiency     BMI:   Estimated body mass index is 33.29 kg/m  as calculated from the following:    Height as of 2/3/20: 1.575 m (5' 2\").    Weight as of 2/3/20: 82.6 kg (182 lb).       Disclaimer: This note consists of symbols derived from keyboarding, dictation and/or voice recognition software. As a result, there may be errors in the script that have gone undetected. Please consider this when interpreting information found in this chart.      See Patient Instructions  Patient Instructions   Okay to continue Prilosec for another 6 to 8 " weeks and after that use it as needed   PPI's  Like Prilosec affect calcium absorption so make sure take adequate calcium with vit D   Your hydrochlorothiazide was discontinued due to low sodium  Wear compression socks for leg swelling  If needed we can prescribe furosemide in place of hydrochlorothiazide  Follow up in 3 months  Will repeat your sodium level at that time  Seek sooner medical attention if there is any worsening of symptoms or problems.            Return in about 3 months (around 11/20/2020) for wellness visit.    Maribell Castaneda MD  [facility]    Phone call duration:  6 minutes

## 2020-08-20 NOTE — PATIENT INSTRUCTIONS
Okay to continue Prilosec for another 6 to 8 weeks and after that use it as needed   PPI's  Like Prilosec affect calcium absorption so make sure take adequate calcium with vit D   Your hydrochlorothiazide was discontinued due to low sodium  Wear compression socks for leg swelling  If needed we can prescribe furosemide in place of hydrochlorothiazide  Follow up in 3 months  Will repeat your sodium level at that time  Seek sooner medical attention if there is any worsening of symptoms or problems.

## 2020-09-23 DIAGNOSIS — R10.13 ABDOMINAL PAIN, EPIGASTRIC: ICD-10-CM

## 2020-09-24 NOTE — TELEPHONE ENCOUNTER
Refill request:    OMEPRAZOLE DR 20 MG CAPSULE    Summary: Take 1 tablet (20 mg) by mouth daily, Disp-90 tablet,R-1, E-Prescribe  Profile Rx: patient will contact pharmacy when needed     Dose, Route, Frequency: 20 mg, Oral, DAILY  Start: 8/20/2020  Ord/Sold: 8/20/2020

## 2020-09-25 RX ORDER — OMEPRAZOLE 20 MG/1
20 TABLET, DELAYED RELEASE ORAL DAILY
Qty: 90 TABLET | Refills: 1 | Status: SHIPPED | OUTPATIENT
Start: 2020-09-25 | End: 2021-02-04

## 2020-12-21 DIAGNOSIS — R10.13 ABDOMINAL PAIN, EPIGASTRIC: ICD-10-CM

## 2021-01-13 DIAGNOSIS — M54.9 UPPER BACK PAIN: ICD-10-CM

## 2021-01-13 RX ORDER — TIZANIDINE 2 MG/1
4 TABLET ORAL 2 TIMES DAILY PRN
Qty: 120 TABLET | Refills: 3 | Status: SHIPPED | OUTPATIENT
Start: 2021-01-13 | End: 2021-02-04

## 2021-02-04 ENCOUNTER — OFFICE VISIT (OUTPATIENT)
Dept: FAMILY MEDICINE | Facility: CLINIC | Age: 76
End: 2021-02-04
Payer: MEDICARE

## 2021-02-04 VITALS
BODY MASS INDEX: 34.04 KG/M2 | DIASTOLIC BLOOD PRESSURE: 70 MMHG | SYSTOLIC BLOOD PRESSURE: 150 MMHG | OXYGEN SATURATION: 96 % | HEIGHT: 62 IN | WEIGHT: 185 LBS | HEART RATE: 72 BPM | TEMPERATURE: 97.3 F

## 2021-02-04 DIAGNOSIS — J45.20 INTERMITTENT ASTHMA, UNCOMPLICATED: ICD-10-CM

## 2021-02-04 DIAGNOSIS — F51.01 PRIMARY INSOMNIA: ICD-10-CM

## 2021-02-04 DIAGNOSIS — R10.13 ABDOMINAL PAIN, EPIGASTRIC: ICD-10-CM

## 2021-02-04 DIAGNOSIS — Z78.0 ASYMPTOMATIC POSTMENOPAUSAL STATUS: ICD-10-CM

## 2021-02-04 DIAGNOSIS — I10 HTN, GOAL BELOW 140/90: ICD-10-CM

## 2021-02-04 DIAGNOSIS — M54.9 UPPER BACK PAIN: ICD-10-CM

## 2021-02-04 DIAGNOSIS — E78.5 HYPERLIPIDEMIA, UNSPECIFIED HYPERLIPIDEMIA TYPE: ICD-10-CM

## 2021-02-04 DIAGNOSIS — D64.9 ANEMIA, UNSPECIFIED TYPE: ICD-10-CM

## 2021-02-04 DIAGNOSIS — Z79.899 MEDICATION MANAGEMENT: ICD-10-CM

## 2021-02-04 DIAGNOSIS — Z13.29 SCREENING FOR THYROID DISORDER: ICD-10-CM

## 2021-02-04 DIAGNOSIS — Z00.00 ENCOUNTER FOR MEDICARE ANNUAL WELLNESS EXAM: Primary | ICD-10-CM

## 2021-02-04 DIAGNOSIS — M85.80 OSTEOPENIA, UNSPECIFIED LOCATION: ICD-10-CM

## 2021-02-04 LAB
ERYTHROCYTE [DISTWIDTH] IN BLOOD BY AUTOMATED COUNT: 14.1 % (ref 10–15)
HCT VFR BLD AUTO: 37.5 % (ref 35–47)
HGB BLD-MCNC: 12 G/DL (ref 11.7–15.7)
MCH RBC QN AUTO: 27.5 PG (ref 26.5–33)
MCHC RBC AUTO-ENTMCNC: 32 G/DL (ref 31.5–36.5)
MCV RBC AUTO: 86 FL (ref 78–100)
PLATELET # BLD AUTO: 215 10E9/L (ref 150–450)
RBC # BLD AUTO: 4.36 10E12/L (ref 3.8–5.2)
WBC # BLD AUTO: 5.7 10E9/L (ref 4–11)

## 2021-02-04 PROCEDURE — 84443 ASSAY THYROID STIM HORMONE: CPT | Performed by: INTERNAL MEDICINE

## 2021-02-04 PROCEDURE — 36415 COLL VENOUS BLD VENIPUNCTURE: CPT | Performed by: INTERNAL MEDICINE

## 2021-02-04 PROCEDURE — 99214 OFFICE O/P EST MOD 30 MIN: CPT | Mod: 25 | Performed by: INTERNAL MEDICINE

## 2021-02-04 PROCEDURE — 82607 VITAMIN B-12: CPT | Performed by: INTERNAL MEDICINE

## 2021-02-04 PROCEDURE — G0439 PPPS, SUBSEQ VISIT: HCPCS | Performed by: INTERNAL MEDICINE

## 2021-02-04 PROCEDURE — 80048 BASIC METABOLIC PNL TOTAL CA: CPT | Performed by: INTERNAL MEDICINE

## 2021-02-04 PROCEDURE — 85027 COMPLETE CBC AUTOMATED: CPT | Performed by: INTERNAL MEDICINE

## 2021-02-04 PROCEDURE — 82728 ASSAY OF FERRITIN: CPT | Performed by: INTERNAL MEDICINE

## 2021-02-04 PROCEDURE — 80061 LIPID PANEL: CPT | Performed by: INTERNAL MEDICINE

## 2021-02-04 RX ORDER — ALBUTEROL SULFATE 90 UG/1
2 AEROSOL, METERED RESPIRATORY (INHALATION) EVERY 4 HOURS PRN
Qty: 8.5 INHALER | Refills: 11 | Status: SHIPPED | OUTPATIENT
Start: 2021-02-04 | End: 2023-03-20

## 2021-02-04 RX ORDER — TIZANIDINE 2 MG/1
2-4 TABLET ORAL DAILY PRN
Qty: 60 TABLET | Refills: 3 | Status: SHIPPED | OUTPATIENT
Start: 2021-02-04 | End: 2021-12-21

## 2021-02-04 RX ORDER — OMEPRAZOLE 20 MG/1
20 TABLET, DELAYED RELEASE ORAL DAILY
Qty: 90 TABLET | Refills: 3 | Status: SHIPPED | OUTPATIENT
Start: 2021-02-04 | End: 2022-09-15 | Stop reason: ALTCHOICE

## 2021-02-04 RX ORDER — CLONIDINE 0.1 MG/24H
1 PATCH, EXTENDED RELEASE TRANSDERMAL WEEKLY
Qty: 12 PATCH | Refills: 0 | Status: SHIPPED | OUTPATIENT
Start: 2021-02-04 | End: 2021-03-16 | Stop reason: ALTCHOICE

## 2021-02-04 RX ORDER — QUETIAPINE FUMARATE 100 MG/1
150 TABLET, FILM COATED ORAL AT BEDTIME
Qty: 135 TABLET | Refills: 3 | Status: SHIPPED | OUTPATIENT
Start: 2021-02-04 | End: 2021-11-16

## 2021-02-04 ASSESSMENT — MIFFLIN-ST. JEOR: SCORE: 1277.4

## 2021-02-04 ASSESSMENT — ACTIVITIES OF DAILY LIVING (ADL): CURRENT_FUNCTION: NO ASSISTANCE NEEDED

## 2021-02-04 NOTE — LETTER
February 8, 2021      Carmen Bonner  [address]  [facility] [address]        Mary Morales,     This is to inform you regarding your test result.     Your B12 level has improved .   TSH which is thyroid hormone is normal.   Your total cholesterol is elevated.   HDL which is called good cholesterol is normal.   Your LDL which is called bad cholesterol is elevated.   Eat low cholesterol low fat  diet and do regular physical activity. Avoid high sugar containing food.   You should be on cholesterol lowering medication .   If you agree then let me know and I can send the script of Lipitor to pharmacy.   Basic metabolic panel which includes electrolytes and kidney fucntion is satisfactory   Glucose which is your blood sugar is slightly elevated.   CBC result which includes Hemoglobin and  Platelet Counts is satisfactory.         Sincerely,       Dr.Nasima Tonya MD,FACP     Resulted Orders   TSH with free T4 reflex   Result Value Ref Range    TSH 1.38 0.40 - 4.00 mU/L   Lipid panel reflex to direct LDL Non-fasting   Result Value Ref Range    Cholesterol 253 (H) <200 mg/dL      Comment:      Desirable:       <200 mg/dl    Triglycerides 93 <150 mg/dL      Comment:      Non Fasting    HDL Cholesterol 67 >49 mg/dL    LDL Cholesterol Calculated 167 (H) <100 mg/dL      Comment:      Above desirable:  100-129 mg/dl  Borderline High:  130-159 mg/dL  High:             160-189 mg/dL  Very high:       >189 mg/dl      Non HDL Cholesterol 186 (H) <130 mg/dL      Comment:      Above Desirable:  130-159 mg/dl  Borderline high:  160-189 mg/dl  High:             190-219 mg/dl  Very high:       >219 mg/dl     Basic metabolic panel   Result Value Ref Range    Sodium 135 133 - 144 mmol/L    Potassium 4.3 3.4 - 5.3 mmol/L    Chloride 103 94 - 109 mmol/L    Carbon Dioxide 24 20 - 32 mmol/L    Anion Gap 8 3 - 14 mmol/L    Glucose 106 (H) 70 - 99 mg/dL      Comment:      Non Fasting    Urea Nitrogen 19 7 - 30 mg/dL     Creatinine 0.80 0.52 - 1.04 mg/dL    GFR Estimate 72 >60 mL/min/[1.73_m2]      Comment:      Non  GFR Calc  Starting 12/18/2018, serum creatinine based estimated GFR (eGFR) will be   calculated using the Chronic Kidney Disease Epidemiology Collaboration   (CKD-EPI) equation.      GFR Estimate If Black 83 >60 mL/min/[1.73_m2]      Comment:       GFR Calc  Starting 12/18/2018, serum creatinine based estimated GFR (eGFR) will be   calculated using the Chronic Kidney Disease Epidemiology Collaboration   (CKD-EPI) equation.      Calcium 9.8 8.5 - 10.1 mg/dL   CBC with platelets   Result Value Ref Range    WBC 5.7 4.0 - 11.0 10e9/L    RBC Count 4.36 3.8 - 5.2 10e12/L    Hemoglobin 12.0 11.7 - 15.7 g/dL    Hematocrit 37.5 35.0 - 47.0 %    MCV 86 78 - 100 fl    MCH 27.5 26.5 - 33.0 pg    MCHC 32.0 31.5 - 36.5 g/dL    RDW 14.1 10.0 - 15.0 %    Platelet Count 215 150 - 450 10e9/L   Vitamin B12   Result Value Ref Range    Vitamin B12 1,187 (H) 193 - 986 pg/mL   Ferritin   Result Value Ref Range    Ferritin 25 8 - 252 ng/mL

## 2021-02-04 NOTE — PROGRESS NOTES
"SUBJECTIVE:   Carmen Bonner is a 75 year old female who presents for Preventive Visit.      Patient has been advised of split billing requirements and indicates understanding: Yes   Are you in the first 12 months of your Medicare coverage?  No    Healthy Habits:    In general, how would you rate your overall health?  Very good    Frequency of exercise:  6-7 days/week    Duration of exercise:  15-30 minutes    Do you usually eat at least 4 servings of fruit and vegetables a day, include whole grains    & fiber and avoid regularly eating high fat or \"junk\" foods?  Yes    Taking medications regularly:  Yes    Barriers to taking medications:  None    Medication side effects:  None    Ability to successfully perform activities of daily living:  No assistance needed    Home Safety:  No safety concerns identified    Hearing Impairment:  No hearing concerns    In the past 6 months, have you been bothered by leaking of urine? Yes    In general, how would you rate your overall mental or emotional health?  Good      PHQ-2 Total Score:    Additional concerns today:  No    Do you feel safe in your environment? Yes    Have you ever done Advance Care Planning? (For example, a Health Directive, POLST, or a discussion with a medical provider or your loved ones about your wishes): Yes, patient states has an Advance Care Planning document and will bring a copy to the clinic.       Fall risk  Fallen 2 or more times in the past year?: No  Any fall with injury in the past year?: No    Cognitive Screening   1) Repeat 3 items (Leader, Season, Table)    2) Clock draw: NORMAL  3) 3 item recall: Recalls 2 objects   Results: NORMAL clock, 1-2 items recalled: COGNITIVE IMPAIRMENT LESS LIKELY    Mini-CogTM Copyright CODY Rahman. Licensed by the author for use in [facility]; reprinted with permission (gaye@.[facility]). All rights reserved.      Do you have sleep apnea, excessive snoring or daytime drowsiness?: no    Reviewed and updated " as needed this visit by clinical staff  Tobacco  Allergies  Meds  Problems             Reviewed and updated as needed this visit by Provider   Allergies  Meds  Problems            Social History     Tobacco Use     Smoking status: Former Smoker     Packs/day: 1.00     Years: 26.00     Pack years: 26.00     Quit date:      Years since quittin.1     Smokeless tobacco: Never Used   Substance Use Topics     Alcohol use: No     Alcohol/week: 0.0 standard drinks     Comment: Attends AA Meetings, sober since      If you drink alcohol do you typically have >3 drinks per day or >7 drinks per week? No    Alcohol Use 2021   Prescreen: >3 drinks/day or >7 drinks/week? No               Current providers sharing in care for this patient include:   Patient Care Team:  Maribell Castaneda MD as PCP - General (Internal Medicine)  Maribell Castaneda MD as Assigned PCP    The following health maintenance items are reviewed in Epic and correct as of today:  Health Maintenance   Topic Date Due     COVID-19 Vaccine (1 of 2) 1959     HEPATITIS C SCREENING  1961     ZOSTER IMMUNIZATION (2 of 3) 2006     ASTHMA CONTROL TEST  2020     FALL RISK ASSESSMENT  2021     MEDICARE ANNUAL WELLNESS VISIT  2022     ASTHMA ACTION PLAN  2022     DTAP/TDAP/TD IMMUNIZATION (2 - Td) 2022     LIPID  [id number]     ADVANCE CARE PLANNING  2026     DEXA  2030     PHQ-2  Completed     INFLUENZA VACCINE  Completed     Pneumococcal Vaccine: 65+ Years  Completed     Pneumococcal Vaccine: Pediatrics (0 to 5 Years) and At-Risk Patients (6 to 64 Years)  Aged Out     IPV IMMUNIZATION  Aged Out     MENINGITIS IMMUNIZATION  Aged Out     HEPATITIS B IMMUNIZATION  Aged Out       Review of Systems  Constitutional, HEENT, cardiovascular, pulmonary, GI, , musculoskeletal, neuro, skin, endocrine and psych systems are negative, except as otherwise noted.  Patient is otherwise doing well  She is  "using omeprazole which helps her  She does not want any endoscopy or colonoscopy  She is willing to schedule her mammogram and bone density  OBJECTIVE:   BP (!) 150/70   Pulse 72   Temp 97.3  F (36.3  C) (Temporal)   Ht 1.575 m (5' 2\")   Wt 83.9 kg (185 lb)   SpO2 96%   BMI 33.84 kg/m   Estimated body mass index is 33.84 kg/m  as calculated from the following:    Height as of this encounter: 1.575 m (5' 2\").    Weight as of this encounter: 83.9 kg (185 lb).  Physical Exam  GENERAL: healthy, alert and no distress  EYES: Eyes grossly normal to inspection, PERRL and conjunctivae and sclerae normal  HENT: ear canals and TM's normal, nose and mouth without ulcers or lesions  NECK: no adenopathy,   RESP: lungs clear to auscultation - no rales, rhonchi or wheezes  BREAST: normal without masses, tenderness or nipple discharge and no palpable axillary masses or adenopathy  CV: regular rate and rhythm, normal S1 S2, no S3 or S4, no murmur, click or rub, no peripheral edema and peripheral pulses strong  ABDOMEN: soft, nontender, no hepatosplenomegaly, no masses and bowel sounds normal  MS: no gross musculoskeletal defects noted, no edema  SKIN: no suspicious lesions or rashes  She has seborrheic keratosis lesion on her breast which is there for long period of time and it is not changing  This is on right breast  Patient has seen a skin specialist regarding that  PSYCH: mentation appears normal, affect normal/bright        ASSESSMENT / PLAN:   Carmen was seen today for physical.    Diagnoses and all orders for this visit:    Encounter for Medicare annual wellness exam  Preventive health counseling was also done.  You for mammogram and bone density  Due for Shingrix    Screening for thyroid disorder  -     TSH with free T4 reflex    Upper back pain  -     tiZANidine (ZANAFLEX) 2 MG tablet; Take 1-2 tablets (2-4 mg) by mouth daily as needed for muscle spasms    Abdominal pain, epigastric  Comments:  between breast and above " "umblicus   Orders:  -     omeprazole (PRILOSEC OTC) 20 MG EC tablet; Take 1 tablet (20 mg) by mouth daily  Medication is helping her    Primary insomnia  -     QUEtiapine (SEROQUEL) 100 MG tablet; Take 1.5 tablets (150 mg) by mouth At Bedtime for sleep  This works for her    Intermittent asthma, uncomplicated  -     albuterol (PROAIR HFA) 108 (90 Base) MCG/ACT inhaler; Inhale 2 puffs into the lungs every 4 hours as needed for shortness of breath / dyspnea or wheezing  It is well controlled    Osteopenia, unspecified location  -     DX Hip/Pelvis/Spine; Future  Discussed the importance of taking adequate calcium and vitamin D and weightbearing exercises    Asymptomatic postmenopausal status  -     DX Hip/Pelvis/Spine; Future    Anemia, unspecified type  -     CBC with platelets  -     Vitamin B12  -     Ferritin  Does not want endoscopy or colonoscopy for work-up of anemia    Hyperlipidemia, unspecified hyperlipidemia type  -     Lipid panel reflex to direct LDL Non-fasting    Medication management  -     Basic metabolic panel    HTN, goal below 140/90  -     cloNIDine (CATAPRES-TTS1) 0.1 MG/24HR WK patch; Place 1 patch onto the skin once a week For Blood Pressure    Blood pressure is a staying on the higher side  Add clonidine  Continue amlodipine and lisinopril  Monitor blood pressure at home  Follow-up in 4 to 6 weeks      Patient has been advised of split billing requirements and indicates understanding: Yes  COUNSELING:  Reviewed preventive health counseling, as reflected in patient instructions       Regular exercise       Healthy diet/nutrition    Estimated body mass index is 33.84 kg/m  as calculated from the following:    Height as of this encounter: 1.575 m (5' 2\").    Weight as of this encounter: 83.9 kg (185 lb).    Weight management plan: Discussed healthy diet and exercise guidelines    She reports that she quit smoking about 26 years ago. She has a 26.00 pack-year smoking history. She has never used " smokeless tobacco.      Appropriate preventive services were discussed with this patient, including applicable screening as appropriate for cardiovascular disease, diabetes, osteopenia/osteoporosis, and glaucoma.  As appropriate for age/gender, discussed screening for colorectal cancer, prostate cancer, breast cancer, and cervical cancer. Checklist reviewing preventive services available has been given to the patient.    Reviewed patients plan of care and provided an AVS. The Basic Care Plan (routine screening as documented in Health Maintenance) for Carmen meets the Care Plan requirement. This Care Plan has been established and reviewed with the Patient.      Disclaimer: This note consists of symbols derived from keyboarding, dictation and/or voice recognition software. As a result, there may be errors in the script that have gone undetected. Please consider this when interpreting information found in this chart.    Counseling Resources:  ATP IV Guidelines  Pooled Cohorts Equation Calculator  Breast Cancer Risk Calculator  Breast Cancer: Medication to Reduce Risk  FRAX Risk Assessment  ICSI Preventive Guidelines  Dietary Guidelines for Americans, 2010  [employer]'s MyPlate  ASA Prophylaxis  Lung CA Screening    Maribell Castaneda MD  [facility]    Identified Health Risks:

## 2021-02-04 NOTE — LETTER
My Asthma Action Plan    Name: Carmen Bonner   YOB: 1945  Date: 2/4/2021   My doctor: Maribell Castaneda MD   My clinic: [facility]        My Rescue Medicine:   Albuterol inhaler (Proair/Ventolin/Proventil HFA)  2-4 puffs EVERY 4 HOURS as needed. Use a spacer if recommended by your provider.   My Asthma Severity:   Intermittent / Exercise Induced  Know your asthma triggers: cold air  None          GREEN ZONE   Good Control    I feel good    No cough or wheeze    Can work, sleep and play without asthma symptoms       Take your asthma control medicine every day.     1. If exercise triggers your asthma, take your rescue medication    15 minutes before exercise or sports, and    During exercise if you have asthma symptoms  2. Spacer to use with inhaler: If you have a spacer, make sure to use it with your inhaler             YELLOW ZONE Getting Worse  I have ANY of these:    I do not feel good    Cough or wheeze    Chest feels tight    Wake up at night   1. Keep taking your Green Zone medications  2. Start taking your rescue medicine:    every 20 minutes for up to 1 hour. Then every 4 hours for 24-48 hours.  3. If you stay in the Yellow Zone for more than 12-24 hours, contact your doctor.  4. If you do not return to the Green Zone in 12-24 hours or you get worse, start taking your oral steroid medicine if prescribed by your provider.           RED ZONE Medical Alert - Get Help  I have ANY of these:    I feel awful    Medicine is not helping    Breathing getting harder    Trouble walking or talking    Nose opens wide to breathe       1. Take your rescue medicine NOW  2. If your provider has prescribed an oral steroid medicine, start taking it NOW  3. Call your doctor NOW  4. If you are still in the Red Zone after 20 minutes and you have not reached your doctor:    Take your rescue medicine again and    Call 911 or go to the emergency room right away    See your regular doctor within 2  weeks of an Emergency Room or Urgent Care visit for follow-up treatment.          Annual Reminders:  Meet with Asthma Educator,  Flu Shot in the Fall, consider Pneumonia Vaccination for patients with asthma (aged 19 and older).    Pharmacy: [id number] - [place], [place] - [address]    Electronically signed by Maribell Castaneda MD   Date: 02/04/21                    Asthma Triggers  How To Control Things That Make Your Asthma Worse    Triggers are things that make your asthma worse.  Look at the list below to help you find your triggers and   what you can do about them. You can help prevent asthma flare-ups by staying away from your triggers.      Trigger                                                          What you can do   Cigarette Smoke  Tobacco smoke can make asthma worse. Do not allow smoking in your home, car or around you.  Be sure no one smokes at a child s day care or school.  If you smoke, ask your health care provider for ways to help you quit.  Ask family members to quit too.  Ask your health care provider for a referral to Quit Plan to help you quit smoking, or call 2-681-463-PLAN.     Colds, Flu, Bronchitis  These are common triggers of asthma. Wash your hands often.  Don t touch your eyes, nose or mouth.  Get a flu shot every year.     Dust Mites  These are tiny bugs that live in cloth or carpet. They are too small to see. Wash sheets and blankets in hot water every week.   Encase pillows and mattress in dust mite proof covers.  Avoid having carpet if you can. If you have carpet, vacuum weekly.   Use a dust mask and HEPA vacuum.   Pollen and Outdoor Mold  Some people are allergic to trees, grass, or weed pollen, or molds. Try to keep your windows closed.  Limit time out doors when pollen count is high.   Ask you health care provider about taking medicine during allergy season.     Animal Dander  Some people are allergic to skin flakes, urine or saliva from pets with fur or feathers. Keep  pets with fur or feathers out of your home.    If you can t keep the pet outdoors, then keep the pet out of your bedroom.  Keep the bedroom door closed.  Keep pets off cloth furniture and away from stuffed toys.     Mice, Rats, and Cockroaches  Some people are allergic to the waste from these pests.   Cover food and garbage.  Clean up spills and food crumbs.  Store grease in the refrigerator.   Keep food out of the bedroom.   Indoor Mold  This can be a trigger if your home has high moisture. Fix leaking faucets, pipes, or other sources of water.   Clean moldy surfaces.  Dehumidify basement if it is damp and smelly.   Smoke, Strong Odors, and Sprays  These can reduce air quality. Stay away from strong odors and sprays, such as perfume, powder, hair spray, paints, smoke incense, paint, cleaning products, candles and new carpet.   Exercise or Sports  Some people with asthma have this trigger. Be active!  Ask your doctor about taking medicine before sports or exercise to prevent symptoms.    Warm up for 5-10 minutes before and after sports or exercise.     Other Triggers of Asthma  Cold air:  Cover your nose and mouth with a scarf.  Sometimes laughing or crying can be a trigger.  Some medicines and food can trigger asthma.

## 2021-02-04 NOTE — PATIENT INSTRUCTIONS
You are due for mammogram and bone density  Please call the following number to make appointment :  [phone]  It is located in [address]  Labs today  Monitor your blood pressure once a week  at home.  Bring those readings on your next visit.  Notify us if your blood pressure readings consistently stays greater than 140/90.     There is this is a new shingles vaccine available called shingrex  It is a series of 2 shots 2-6 months apart.  Considered more than 90% effective.  Please go to any pharmacy to get the  vaccine    Add clonidine patch for Blood Pressure     Follow up in 6 weeks   Seek sooner medical attention if there is any worsening of symptoms or problems.        Patient Education   Personalized Prevention Plan  You are due for the preventive services outlined below.  Your care team is available to assist you in scheduling these services.  If you have already completed any of these items, please share that information with your care team to update in your medical record.  Health Maintenance Due   Topic Date Due     COVID-19 Vaccine (1 of 2) [birth year]/07/1959     Hepatitis C Screening  11/07/1961     Zoster (Shingles) Vaccine (2 of 3) 02/26/2006     Asthma Action Plan - yearly  11/02/2019     Asthma Control Test  01/29/2020     Preventive Health Recommendations    See your health care provider every year to    Review health changes.     Discuss preventive care.      Review your medicines if your doctor has prescribed any.    You no longer need a yearly Pap test unless you've had an abnormal Pap test in the past 10 years. If you have vaginal symptoms, such as bleeding or discharge, be sure to talk with your provider about a Pap test.    Every 1 to 2 years, have a mammogram.  If you are over 69, talk with your health care provider about whether or not you want to continue having screening mammograms.    Every 10 years, have a colonoscopy. Or, have a yearly FIT test (stool test). These exams will check for colon  cancer.     Have a cholesterol test every 5 years, or more often if your doctor advises it.     Have a diabetes test (fasting glucose) every three years. If you are at risk for diabetes, you should have this test more often.     At age 65, have a bone density scan (DEXA) to check for osteoporosis (brittle bone disease).    Shots:    Get a flu shot each year.    Get a tetanus shot every 10 years.    Talk to your doctor about your pneumonia vaccines. There are now two you should receive - Pneumovax (PPSV 23) and Prevnar (PCV 13).    Talk to your pharmacist about the shingles vaccine.    Talk to your doctor about the hepatitis B vaccine.    Nutrition:     Eat at least 5 servings of fruits and vegetables each day.    Eat whole-grain bread, whole-wheat pasta and brown rice instead of white grains and rice.    Get adequate Calcium and Vitamin D.     Lifestyle    Exercise at least 150 minutes a week (30 minutes a day, 5 days a week). This will help you control your weight and prevent disease.    Limit alcohol to one drink per day.    No smoking.     Wear sunscreen to prevent skin cancer.     See your dentist twice a year for an exam and cleaning.    See your eye doctor every 1 to 2 years to screen for conditions such as glaucoma, macular degeneration and cataracts.    Personalized Prevention Plan  You are due for the preventive services outlined below.  Your care team is available to assist you in scheduling these services.  If you have already completed any of these items, please share that information with your care team to update in your medical record.  Health Maintenance   Topic Date Due     COVID-19 Vaccine (1 of 2) 11/07/1959     HEPATITIS C SCREENING  11/07/1961     ZOSTER IMMUNIZATION (2 of 3) 02/26/2006     ASTHMA ACTION PLAN  11/02/2019     ASTHMA CONTROL TEST  01/29/2020     FALL RISK ASSESSMENT  08/20/2021     MEDICARE ANNUAL WELLNESS VISIT  02/04/2022     DTAP/TDAP/TD IMMUNIZATION (2 - Td) 03/05/2022      LIPID  07/29/2025     ADVANCE CARE PLANNING  02/04/2026     DEXA  09/09/2030     PHQ-2  Completed     INFLUENZA VACCINE  Completed     Pneumococcal Vaccine: 65+ Years  Completed     Pneumococcal Vaccine: Pediatrics (0 to 5 Years) and At-Risk Patients (6 to 64 Years)  Aged Out     IPV IMMUNIZATION  Aged Out     MENINGITIS IMMUNIZATION  Aged Out     HEPATITIS B IMMUNIZATION  Aged Out       Urinary Incontinence, Female (Adult)   Urinary incontinence means loss of bladder control. This problem affects many women, especially as they get older. If you have incontinence, you may be embarrassed to ask for help. But know that this problem can be treated.   Types of Incontinence  There are different types of incontinence. Two of the main types are described here. You can have more than one type.     Stress incontinence. With this type, urine leaks when pressure (stress) is put on the bladder. This may happen when you cough, sneeze, or laugh. Stress incontinence most often occurs because the pelvic floor muscles that support the bladder and urethra are weak. This can happen after pregnancy and vaginal childbirth or a hysterectomy. It can also be due to excess body weight or hormone changes.    Urge incontinence (also called overactive bladder). With this type, a sudden urge to urinate is felt often. This may happen even though there may not be much urine in the bladder. The need to urinate often during the night is common. Urge incontinence most often occurs because of bladder spasms. This may be due to bladder irritation or infection. Damage to bladder nerves or pelvic muscles, constipation, and certain medicines can also lead to urge incontinence.  Treatment depends on the cause. Further evaluation is needed to find the type you have. This will likely include an exam and certain tests. Based on the results, you and your healthcare provider can then plan treatment. Until a diagnosis is made, the home care tips below can help  ease symptoms.   Home care    Do pelvic floor muscle exercises, if they are prescribed. The pelvic floor muscles help support the bladder and urethra. Many women find that their symptoms improve when doing special exercises that strengthen these muscles. To do the exercises, contract the muscles you would use to stop your stream of urine. But do this when you re not urinating. Hold for 10 seconds, then relax. Repeat 10 to 20 times in a row, at least 3 times a day. Your healthcare provider may give you other instructions for how to do the exercises and how often.    Keep a bladder diary. This helps track how often and how much you urinate over a set period of time. Bring this diary with you to your next visit with the provider. The information can help your provider learn more about your bladder problem.    Lose weight, if advised to by your provider. Extra weight puts pressure on the bladder. Your provider can help you create a weight-loss plan that s right for you. This may include exercising more and making certain diet changes.    Don't have foods and drinks that may irritate the bladder. These can include alcohol and caffeinated drinks.    Quit smoking. Smoking and other tobacco use can lead to a long-term (chronic) cough that strains the pelvic floor muscles. Smoking may also damage the bladder and urethra. Talk with your provider about treatments or methods you can use to quit smoking.    If drinking large amounts of fluid makes you have symptoms, you may be advised to limit your fluid intake. You may also be advised to drink most of your fluids during the day and to limit fluids at night.    If you re worried about urine leakage or accidents, you may wear absorbent pads to catch urine. Change the pads often. This helps reduce discomfort. It may also reduce the risk of skin or bladder infections.    Follow-up care  Follow up with your healthcare provider, or as directed. It may take some to find the right  treatment for your problem. But healthy lifestyle changes can be made right away. These include such things as exercising on a regular basis, eating a healthy diet, losing weight (if needed), and quitting smoking. Your treatment plan may include special therapies or medicines. Certain procedures or surgery may also be options. Talk about any questions you have with your provider.   When to seek medical advice  Call the healthcare provider right away if any of these occur:    Fever of 100.4 F (38 C) or higher, or as directed by your provider    Bladder pain or fullness    Belly swelling    Nausea or vomiting    Back pain    Weakness, dizziness, or fainting  Alexandra last reviewed this educational content on 1/1/2020 2000-2020 [employer]. [address] [place] [address], [place] [address]. All rights reserved. This information is not intended as a substitute for professional medical care. Always follow your healthcare professional's instructions.

## 2021-02-05 LAB
ANION GAP SERPL CALCULATED.3IONS-SCNC: 8 MMOL/L (ref 3–14)
BUN SERPL-MCNC: 19 MG/DL (ref 7–30)
CALCIUM SERPL-MCNC: 9.8 MG/DL (ref 8.5–10.1)
CHLORIDE SERPL-SCNC: 103 MMOL/L (ref 94–109)
CHOLEST SERPL-MCNC: 253 MG/DL
CO2 SERPL-SCNC: 24 MMOL/L (ref 20–32)
CREAT SERPL-MCNC: 0.8 MG/DL (ref 0.52–1.04)
FERRITIN SERPL-MCNC: 25 NG/ML (ref 8–252)
GFR SERPL CREATININE-BSD FRML MDRD: 72 ML/MIN/{1.73_M2}
GLUCOSE SERPL-MCNC: 106 MG/DL (ref 70–99)
HDLC SERPL-MCNC: 67 MG/DL
LDLC SERPL CALC-MCNC: 167 MG/DL
NONHDLC SERPL-MCNC: 186 MG/DL
POTASSIUM SERPL-SCNC: 4.3 MMOL/L (ref 3.4–5.3)
SODIUM SERPL-SCNC: 135 MMOL/L (ref 133–144)
TRIGL SERPL-MCNC: 93 MG/DL
TSH SERPL DL<=0.005 MIU/L-ACNC: 1.38 MU/L (ref 0.4–4)
VIT B12 SERPL-MCNC: 1187 PG/ML (ref 193–986)

## 2021-02-05 ASSESSMENT — ASTHMA QUESTIONNAIRES: ACT_TOTALSCORE: 21

## 2021-02-08 NOTE — RESULT ENCOUNTER NOTE
Please notify patient by sending following letter with copy of test results      Mary Morales,    This is to inform you regarding your test result.    Your B12 level has improved .  TSH which is thyroid hormone is normal.  Your total cholesterol is elevated.  HDL which is called good cholesterol is normal.  Your LDL which is called bad cholesterol is elevated.  Eat low cholesterol low fat  diet and do regular physical activity. Avoid high sugar containing food.  You should be on cholesterol lowering medication .  If you agree then let me know and I can send the script of Lipitor to pharmacy.  Basic metabolic panel which includes electrolytes and kidney fucntion is satisfactory   Glucose which is your blood sugar is slightly elevated.  CBC result which includes Hemoglobin and  Platelet Counts is satisfactory.          Sincerely,      Dr.Nasima Tonya MD,FACP

## 2021-03-02 NOTE — LETTER
[place] [address] [facility]  [address]  [place]  [address]  Tel: [phone]    August 30, 2019    Carmen Bonner  [address]  [facility] [address]        Dear Ms. Bonner,    This is to inform you regarding your test result.    Ferritin which is iron stores in the body is on low end of normal.  Eat food rich in iron  We can recheck that in 4 months    Sincerely,      Dr.Nasima Tonya MD,FACP/SML        Enclosure: Lab Results  Results for orders placed or performed in visit on 08/29/19   Lipid panel reflex to direct LDL Fasting   Result Value Ref Range    Cholesterol 241 (H) <200 mg/dL    Triglycerides 126 <150 mg/dL    HDL Cholesterol 55 >49 mg/dL    LDL Cholesterol Calculated 161 (H) <100 mg/dL    Non HDL Cholesterol 186 (H) <130 mg/dL   TSH with free T4 reflex   Result Value Ref Range    TSH 2.15 0.40 - 4.00 mU/L   Glucose   Result Value Ref Range    Glucose 109 (H) 70 - 99 mg/dL   CBC with platelets   Result Value Ref Range    WBC 5.2 4.0 - 11.0 10e9/L    RBC Count 4.01 3.8 - 5.2 10e12/L    Hemoglobin 11.6 (L) 11.7 - 15.7 g/dL    Hematocrit 35.8 35.0 - 47.0 %    MCV 89 78 - 100 fl    MCH 28.9 26.5 - 33.0 pg    MCHC 32.4 31.5 - 36.5 g/dL    RDW 13.5 10.0 - 15.0 %    Platelet Count 225 150 - 450 10e9/L   CRP inflammation   Result Value Ref Range    CRP Inflammation <2.9 0.0 - 8.0 mg/L   Ferritin   Result Value Ref Range    Ferritin 44 8 - 252 ng/mL        no

## 2021-03-16 ENCOUNTER — OFFICE VISIT (OUTPATIENT)
Dept: FAMILY MEDICINE | Facility: CLINIC | Age: 76
End: 2021-03-16
Payer: MEDICARE

## 2021-03-16 VITALS
TEMPERATURE: 96.5 F | HEIGHT: 62 IN | HEART RATE: 67 BPM | WEIGHT: 184 LBS | SYSTOLIC BLOOD PRESSURE: 155 MMHG | BODY MASS INDEX: 33.86 KG/M2 | OXYGEN SATURATION: 98 % | DIASTOLIC BLOOD PRESSURE: 69 MMHG

## 2021-03-16 DIAGNOSIS — Z79.899 MEDICATION MANAGEMENT: ICD-10-CM

## 2021-03-16 DIAGNOSIS — E78.5 HYPERLIPIDEMIA, UNSPECIFIED HYPERLIPIDEMIA TYPE: ICD-10-CM

## 2021-03-16 DIAGNOSIS — I10 HTN, GOAL BELOW 140/90: Primary | ICD-10-CM

## 2021-03-16 PROCEDURE — 99214 OFFICE O/P EST MOD 30 MIN: CPT | Performed by: INTERNAL MEDICINE

## 2021-03-16 RX ORDER — ROSUVASTATIN CALCIUM 5 MG/1
5 TABLET, COATED ORAL DAILY
Qty: 90 TABLET | Refills: 1 | Status: SHIPPED | OUTPATIENT
Start: 2021-03-16 | End: 2021-09-16

## 2021-03-16 RX ORDER — CLONIDINE HYDROCHLORIDE 0.2 MG/1
0.2 TABLET ORAL 2 TIMES DAILY
Qty: 60 TABLET | Refills: 3 | Status: SHIPPED | OUTPATIENT
Start: 2021-03-16 | End: 2021-04-27

## 2021-03-16 ASSESSMENT — MIFFLIN-ST. JEOR: SCORE: 1272.87

## 2021-03-16 NOTE — PROGRESS NOTES
Assessment & Plan     Carmen was seen today for recheck medication.    Diagnoses and all orders for this visit:    HTN, goal below 140/90  -     cloNIDine (CATAPRES) 0.2 MG tablet; Take 1 tablet (0.2 mg) by mouth 2 times daily  -     Basic metabolic panel; Future  Patient says clonidine patch does not stick well and falls off  She has tried all the techniques but it does not stick well  It is not working well either  Blood pressure has improved but it is still on higher side  She requested tablets of clonidine  Cost is also an issue for the patches  She is going to discontinue clonidine patch  She will start clonidine tablets  If blood pressure does not come under control then will titrate the dose    Hyperlipidemia, unspecified hyperlipidemia type  -     rosuvastatin (CRESTOR) 5 MG tablet; Take 1 tablet (5 mg) by mouth daily for cholestrol  -     Lipid panel reflex to direct LDL Non-fasting; Future  She agreed to take cholesterol-lowering medication  Educated her about that  She will follow-up in 6 weeks for blood pressure  She will get her labs done before that appointment    Medication management  -     ALT; Future        See Patient Instructions  Patient Instructions   Discontinue clonidine patch  Start taking clonidine 0.2 mg twice a day for Blood Pressure   Start Crestor 5 mg daily for cholesterol  Monitor your blood pressure once a week  at home.  Bring those readings on your next visit.  Notify us if your blood pressure readings consistently stays greater than 140/90.  Follow up in 6 weeks        Return in about 6 weeks (around 4/27/2021) for Hypertension, medication follow up.    Maribell Castaneda MD  [facility] MILO Morales is a 75 year old who presents for the following health issues     HPI     Medication Followup of Clonidine    Taking Medication as prescribed: yes    Side Effects:  Having issues with the patch staying on the full week - it falls off. Would like to talk  "about taking the pill form.    Medication Helping Symptoms:  Somewhat         Review of Systems   Constitutional, HEENT, cardiovascular, pulmonary, GI, , musculoskeletal, neuro, skin, endocrine and psych systems are negative, except as otherwise noted.      Objective    BP (!) 155/69 (BP Location: Right arm, Cuff Size: Adult Large)   Pulse 67   Temp 96.5  F (35.8  C) (Temporal)   Ht 1.575 m (5' 2\")   Wt 83.5 kg (184 lb)   SpO2 98%   Breastfeeding No   BMI 33.65 kg/m    Body mass index is 33.65 kg/m .  Physical Exam   She is very nice and pleasant.  She is comfortable and not in any kind of distress.  She is fully alert awake oriented.        Disclaimer: This note consists of symbols derived from keyboarding, dictation and/or voice recognition software. As a result, there may be errors in the script that have gone undetected. Please consider this when interpreting information found in this chart.    "

## 2021-03-16 NOTE — PATIENT INSTRUCTIONS
Discontinue clonidine patch  Start taking clonidine 0.2 mg twice a day for Blood Pressure   Start Crestor 5 mg daily for cholesterol  Monitor your blood pressure once a week  at home.  Bring those readings on your next visit.  Notify us if your blood pressure readings consistently stays greater than 140/90.  Follow up in 6 weeks

## 2021-04-22 DIAGNOSIS — E78.5 HYPERLIPIDEMIA, UNSPECIFIED HYPERLIPIDEMIA TYPE: ICD-10-CM

## 2021-04-22 DIAGNOSIS — I10 HTN, GOAL BELOW 140/90: ICD-10-CM

## 2021-04-22 DIAGNOSIS — Z79.899 MEDICATION MANAGEMENT: ICD-10-CM

## 2021-04-22 LAB
ALT SERPL W P-5'-P-CCNC: 27 U/L (ref 0–50)
ANION GAP SERPL CALCULATED.3IONS-SCNC: 1 MMOL/L (ref 3–14)
BUN SERPL-MCNC: 18 MG/DL (ref 7–30)
CALCIUM SERPL-MCNC: 9.2 MG/DL (ref 8.5–10.1)
CHLORIDE SERPL-SCNC: 104 MMOL/L (ref 94–109)
CHOLEST SERPL-MCNC: 163 MG/DL
CO2 SERPL-SCNC: 30 MMOL/L (ref 20–32)
CREAT SERPL-MCNC: 0.81 MG/DL (ref 0.52–1.04)
GFR SERPL CREATININE-BSD FRML MDRD: 70 ML/MIN/{1.73_M2}
GLUCOSE SERPL-MCNC: 96 MG/DL (ref 70–99)
HDLC SERPL-MCNC: 61 MG/DL
LDLC SERPL CALC-MCNC: 78 MG/DL
NONHDLC SERPL-MCNC: 102 MG/DL
POTASSIUM SERPL-SCNC: 4.2 MMOL/L (ref 3.4–5.3)
SODIUM SERPL-SCNC: 135 MMOL/L (ref 133–144)
TRIGL SERPL-MCNC: 118 MG/DL

## 2021-04-22 PROCEDURE — 80048 BASIC METABOLIC PNL TOTAL CA: CPT | Performed by: INTERNAL MEDICINE

## 2021-04-22 PROCEDURE — 36415 COLL VENOUS BLD VENIPUNCTURE: CPT | Performed by: INTERNAL MEDICINE

## 2021-04-22 PROCEDURE — 80061 LIPID PANEL: CPT | Performed by: INTERNAL MEDICINE

## 2021-04-22 PROCEDURE — 84460 ALANINE AMINO (ALT) (SGPT): CPT | Performed by: INTERNAL MEDICINE

## 2021-04-23 NOTE — RESULT ENCOUNTER NOTE
Mary Morales,    This is to inform you regarding your test result.    Your total cholesterol is normal.  HDL which is called good cholesterol is normal.  Your LDL cholesterol is normal.  This is often call bad cholesterol and high levels increase the risk for heart attacks and strokes.  Your triglycerides are normal.  ALT which is liver test is fine.  Basic metabolic panel which includes electrolytes and kidney fucntion is satisfactory .  Glucose which is your blood sugar is normal.  Continue present management       Sincerely,      Dr.Nasima Tonya MD,FACP

## 2021-04-27 ENCOUNTER — OFFICE VISIT (OUTPATIENT)
Dept: FAMILY MEDICINE | Facility: CLINIC | Age: 76
End: 2021-04-27
Payer: MEDICARE

## 2021-04-27 VITALS
HEART RATE: 69 BPM | TEMPERATURE: 96.4 F | OXYGEN SATURATION: 97 % | WEIGHT: 185 LBS | SYSTOLIC BLOOD PRESSURE: 102 MMHG | HEIGHT: 62 IN | DIASTOLIC BLOOD PRESSURE: 60 MMHG | BODY MASS INDEX: 34.04 KG/M2

## 2021-04-27 DIAGNOSIS — I10 ESSENTIAL HYPERTENSION: Primary | ICD-10-CM

## 2021-04-27 DIAGNOSIS — E61.1 IRON DEFICIENCY: ICD-10-CM

## 2021-04-27 PROCEDURE — 99213 OFFICE O/P EST LOW 20 MIN: CPT | Performed by: INTERNAL MEDICINE

## 2021-04-27 RX ORDER — CLONIDINE HYDROCHLORIDE 0.1 MG/1
0.1 TABLET ORAL 2 TIMES DAILY
Qty: 60 TABLET | Refills: 3 | Status: SHIPPED | OUTPATIENT
Start: 2021-04-27 | End: 2021-06-29 | Stop reason: DRUGHIGH

## 2021-04-27 ASSESSMENT — MIFFLIN-ST. JEOR: SCORE: 1277.4

## 2021-04-27 NOTE — PROGRESS NOTES
Assessment & Plan     Carmen was seen today for hypertension.    Diagnoses and all orders for this visit:    Essential hypertension  -     cloNIDine (CATAPRES) 0.1 MG tablet; Take 1 tablet (0.1 mg) by mouth 2 times daily  -     Basic metabolic panel; Future  Patient reported that clonidine worked really good and brought her blood pressure down  But this medication makes her feel tired, fatigued and sleepy.  this could be due to low blood pressure  I recommended to lower the dose of clonidine from 0.2 mg to 0.1 mg twice a day  See how that works  If that does not seem to help then I would recommend switching you to hydralazine  He is in agreement trying lower dose of clonidine  She would let me know how this works  Advised to send either my chart message or speak to one of our nursing staff    Iron deficiency  -     Ferritin; Future  -     CBC with platelets; Future  Patient declines work-up for iron deficiency  She understands the risk  She says this problem runs in her family  This is nothing new for her  Discussed with her about taking iron supplement  She can take 2 or 3 times a week  This will helps to improve her symptoms    Discussed lung cancer screening  She declined for that also    [id number]}     See Patient Instructions  Patient Instructions   Ferritin which is iron stores in the body is low.  We want Ferritin level greater than   Take OTC Ferrous Sulfate 325 mg po three times a week  if you tolerate.  Take with vit C as vit C helps to absorb iron.  Iron can make you constipated so take stool softener.  Lower the dose of clonidine to 0.1 mg twice a day    Follow up in 2 months  Seek sooner medical attention if there is any worsening of symptoms or problems.        Return in about 2 months (around 6/27/2021) for medication follow up, Hypertension.    Maribell Castaneda MD  [facility]    Nehemiah Morales is a 75 year old who presents for the following health issues     HPI  "    Clonidine makes her tired   Declined for lung cancer screening   Declined work up of iron deficiency    Review of Systems   Constitutional, HEENT, cardiovascular, pulmonary, gi and gu systems are negative, except as otherwise noted.      Objective    /60 (BP Location: Right arm, Patient Position: Chair, Cuff Size: Adult Large)   Pulse 69   Temp 96.4  F (35.8  C) (Temporal)   Ht 1.575 m (5' 2\")   Wt 83.9 kg (185 lb)   SpO2 97%   BMI 33.84 kg/m    Body mass index is 33.84 kg/m .  Physical Exam       GENERAL APPEARANCE: healthy, alert and no distress  EYES: Eyes grossly normal to inspection, PERRL and conjunctivae and sclerae normal  HENT: ear canals and TM's normal and nose and mouth without ulcers or lesions  NECK: no adenopathy  RESP: lungs clear to auscultation - no rales, rhonchi or wheezes  CV: regular rates and rhythm, normal S1 S2, no S3      Disclaimer: This note consists of symbols derived from keyboarding, dictation and/or voice recognition software. As a result, there may be errors in the script that have gone undetected. Please consider this when interpreting information found in this chart.            "

## 2021-04-27 NOTE — PATIENT INSTRUCTIONS
Ferritin which is iron stores in the body is low.  We want Ferritin level greater than   Take OTC Ferrous Sulfate 325 mg po three times a week  if you tolerate.  Take with vit C as vit C helps to absorb iron.  Iron can make you constipated so take stool softener.  Lower the dose of clonidine to 0.1 mg twice a day    Follow up in 2 months  Seek sooner medical attention if there is any worsening of symptoms or problems.

## 2021-06-25 DIAGNOSIS — E61.1 IRON DEFICIENCY: ICD-10-CM

## 2021-06-25 DIAGNOSIS — I10 ESSENTIAL HYPERTENSION: ICD-10-CM

## 2021-06-25 LAB
ERYTHROCYTE [DISTWIDTH] IN BLOOD BY AUTOMATED COUNT: 13.8 % (ref 10–15)
HCT VFR BLD AUTO: 40.9 % (ref 35–47)
HGB BLD-MCNC: 13.4 G/DL (ref 11.7–15.7)
MCH RBC QN AUTO: 27.5 PG (ref 26.5–33)
MCHC RBC AUTO-ENTMCNC: 32.8 G/DL (ref 31.5–36.5)
MCV RBC AUTO: 84 FL (ref 78–100)
PLATELET # BLD AUTO: 199 10E9/L (ref 150–450)
RBC # BLD AUTO: 4.88 10E12/L (ref 3.8–5.2)
WBC # BLD AUTO: 5.8 10E9/L (ref 4–11)

## 2021-06-25 PROCEDURE — 80048 BASIC METABOLIC PNL TOTAL CA: CPT | Performed by: INTERNAL MEDICINE

## 2021-06-25 PROCEDURE — 85027 COMPLETE CBC AUTOMATED: CPT | Performed by: INTERNAL MEDICINE

## 2021-06-25 PROCEDURE — 36415 COLL VENOUS BLD VENIPUNCTURE: CPT | Performed by: INTERNAL MEDICINE

## 2021-06-25 PROCEDURE — 82728 ASSAY OF FERRITIN: CPT | Performed by: INTERNAL MEDICINE

## 2021-06-26 LAB
ANION GAP SERPL CALCULATED.3IONS-SCNC: 4 MMOL/L (ref 3–14)
BUN SERPL-MCNC: 13 MG/DL (ref 7–30)
CALCIUM SERPL-MCNC: 9.4 MG/DL (ref 8.5–10.1)
CHLORIDE SERPL-SCNC: 103 MMOL/L (ref 94–109)
CO2 SERPL-SCNC: 29 MMOL/L (ref 20–32)
CREAT SERPL-MCNC: 0.76 MG/DL (ref 0.52–1.04)
FERRITIN SERPL-MCNC: 25 NG/ML (ref 8–252)
GFR SERPL CREATININE-BSD FRML MDRD: 75 ML/MIN/{1.73_M2}
GLUCOSE SERPL-MCNC: 107 MG/DL (ref 70–99)
POTASSIUM SERPL-SCNC: 4.5 MMOL/L (ref 3.4–5.3)
SODIUM SERPL-SCNC: 136 MMOL/L (ref 133–144)

## 2021-06-26 NOTE — RESULT ENCOUNTER NOTE
Mray Morales,    This is to inform you regarding your test result.    CBC result which includes white count Hemoglobin and  Platelet Counts is normal.    Other test results are pending.        Sincerely,      Dr.Nasima Tonya MD,FACP

## 2021-06-27 NOTE — RESULT ENCOUNTER NOTE
Mary Morales,    This is to inform you regarding your test result.    Ferritin which is iron stores in the body is low.  We want Ferritin level greater than 100  Take OTC Ferrous Sulfate 325 mg po once daily or every other day if you tolerate.  Take with vit C as vit C helps to absorb iron.  Iron can make you constipated so take stool softener.  Recheck ferritin in 4 months  We had discussion about iron deficiency during last office visit.  Basic metabolic panel which includes electrolytes and kidney fucntion is normal  Glucose which is your blood sugar is slightly elevated.  CBC result which includes white count Hemoglobin and  Platelet Counts is normal.       Sincerely,      Dr.Nasima Tonya MD,FACP

## 2021-06-29 ENCOUNTER — OFFICE VISIT (OUTPATIENT)
Dept: FAMILY MEDICINE | Facility: CLINIC | Age: 76
End: 2021-06-29
Payer: MEDICARE

## 2021-06-29 VITALS
BODY MASS INDEX: 34.04 KG/M2 | SYSTOLIC BLOOD PRESSURE: 113 MMHG | TEMPERATURE: 96.9 F | OXYGEN SATURATION: 98 % | DIASTOLIC BLOOD PRESSURE: 50 MMHG | HEIGHT: 62 IN | WEIGHT: 185 LBS | HEART RATE: 67 BPM

## 2021-06-29 DIAGNOSIS — E61.1 IRON DEFICIENCY: ICD-10-CM

## 2021-06-29 DIAGNOSIS — I10 ESSENTIAL HYPERTENSION: Primary | ICD-10-CM

## 2021-06-29 DIAGNOSIS — E61.1 IRON DEFICIENCY: Primary | ICD-10-CM

## 2021-06-29 DIAGNOSIS — E78.5 HYPERLIPIDEMIA, UNSPECIFIED HYPERLIPIDEMIA TYPE: ICD-10-CM

## 2021-06-29 DIAGNOSIS — Z11.59 NEED FOR HEPATITIS C SCREENING TEST: ICD-10-CM

## 2021-06-29 PROCEDURE — 99214 OFFICE O/P EST MOD 30 MIN: CPT | Performed by: INTERNAL MEDICINE

## 2021-06-29 RX ORDER — CLONIDINE HYDROCHLORIDE 0.2 MG/1
0.2 TABLET ORAL 2 TIMES DAILY
Qty: 180 TABLET | Refills: 1 | Status: SHIPPED | OUTPATIENT
Start: 2021-06-29 | End: 2021-11-16

## 2021-06-29 RX ORDER — CLONIDINE HYDROCHLORIDE 0.2 MG/1
1 TABLET ORAL 2 TIMES DAILY
COMMUNITY
Start: 2021-06-13 | End: 2021-06-29

## 2021-06-29 ASSESSMENT — MIFFLIN-ST. JEOR: SCORE: 1277.4

## 2021-06-29 NOTE — PROGRESS NOTES
Assessment & Plan     Carmen was seen today for follow up.    Diagnoses and all orders for this visit:    Essential hypertension  -     cloNIDine (CATAPRES) 0.2 MG tablet; Take 1 tablet (0.2 mg) by mouth 2 times daily for Blood Pressure  -     Basic metabolic panel; Future    Patient mention that 0.2 mg of clonidine sometimes makes her tired  So she started using 0.1mg sometime  But 0.1mg  does not control her blood pressure  I discussed with her that either she should go on 0.1 mg twice a day consistently or 0.2  You cannot change the dose based on urinate  I wanted to switch her to 0.1  But she insisted about staying on 0.2 mg twice a day  She says even though it is a blood pressure medication but it helps her anxiety also  She does not want to take anything else as this medication helps her  She does not worry about feeling little tired has controlling anxiety is more important for her  So we agreed about continuing 0.2  Otherwise I gave her the option of adding beta-blocker  At this point she declined for that  She has been monitoring her blood pressure at home  Most of the time her blood pressure is under excellent control once in a while it goes higher  She was told by me that she should not stop clonidine suddenly as it can cause rebound hypertension  Monitor your blood pressure once a week  at home.  Bring those readings on your next visit.  Notify us if your blood pressure readings consistently stays greater than 140/90.    Iron deficiency  -     Cancel: CBC with platelets  -     Ferritin; Future  Patient has declined for work-up  This runs in her family  Her numbers are stable  Will recheck in 4 months  But hemoglobin has improved  Iron level remains the same compared to last time    Hyperlipidemia, unspecified hyperlipidemia type  Low-cholesterol low-fat diet  She is on Crestor    Need for hepatitis C screening test  -     Hepatitis C antibody; Future    Other orders  -     REVIEW OF HEALTH MAINTENANCE  "PROTOCOL ORDERS    Labs were reviewed and discussed with the patient      :[id number]}     See Patient Instructions  Patient Instructions   Amlodipine is discontinued  You agreed for clonidine 0.2 mg twice a day  Follow up in 4 months  Labs before appointment   Seek sooner medical attention if there is any worsening of symptoms or problems.        Return in about 4 months (around 10/29/2021) for medication follow up, Hypertension.    Maribell Castaneda MD  [facility] MILO Morales is a 75 year old who presents for the following health issues     HPI     Quit smoking > 15 years ago in 1995  Clonidine helps with anxiety  Wants to continue 0.2 mg   Sometimes uses 0.1 as 0.2 makes her tired  Quit amlodipine due to swelling of legs    Review of Systems   Constitutional, HEENT, cardiovascular, pulmonary, GI, , musculoskeletal, neuro, skin, endocrine and psych systems are negative, except as otherwise noted.      Objective    /50   Pulse 67   Temp 96.9  F (36.1  C) (Temporal)   Ht 1.575 m (5' 2\")   Wt 83.9 kg (185 lb)   SpO2 98%   BMI 33.84 kg/m    Body mass index is 33.84 kg/m .  Physical Exam   GENERAL: healthy, alert and no distress  PSYCH: mentation appears normal, affect normal/bright    Disclaimer: This note consists of symbols derived from keyboarding, dictation and/or voice recognition software. As a result, there may be errors in the script that have gone undetected. Please consider this when interpreting information found in this chart.  30 minutes spent on the date of the encounter doing chart review, history and exam, documentation and further activities as noted above    "

## 2021-06-29 NOTE — PATIENT INSTRUCTIONS
Amlodipine is discontinued  You agreed for clonidine 0.2 mg twice a day  Follow up in 4 months  Labs before appointment   Seek sooner medical attention if there is any worsening of symptoms or problems.

## 2021-06-30 ASSESSMENT — ASTHMA QUESTIONNAIRES: ACT_TOTALSCORE: 22

## 2021-09-15 DIAGNOSIS — E78.5 HYPERLIPIDEMIA, UNSPECIFIED HYPERLIPIDEMIA TYPE: ICD-10-CM

## 2021-09-16 RX ORDER — ROSUVASTATIN CALCIUM 5 MG/1
5 TABLET, COATED ORAL DAILY
Qty: 90 TABLET | Refills: 2 | Status: SHIPPED | OUTPATIENT
Start: 2021-09-16 | End: 2021-12-21

## 2021-10-23 ENCOUNTER — HEALTH MAINTENANCE LETTER (OUTPATIENT)
Age: 76
End: 2021-10-23

## 2021-11-05 ENCOUNTER — LAB (OUTPATIENT)
Dept: LAB | Facility: CLINIC | Age: 76
End: 2021-11-05
Payer: MEDICARE

## 2021-11-05 DIAGNOSIS — Z11.59 NEED FOR HEPATITIS C SCREENING TEST: ICD-10-CM

## 2021-11-05 DIAGNOSIS — I10 ESSENTIAL HYPERTENSION: ICD-10-CM

## 2021-11-05 DIAGNOSIS — E61.1 IRON DEFICIENCY: ICD-10-CM

## 2021-11-05 LAB
ANION GAP SERPL CALCULATED.3IONS-SCNC: 6 MMOL/L (ref 3–14)
BUN SERPL-MCNC: 15 MG/DL (ref 7–30)
CALCIUM SERPL-MCNC: 9 MG/DL (ref 8.5–10.1)
CHLORIDE BLD-SCNC: 101 MMOL/L (ref 94–109)
CO2 SERPL-SCNC: 26 MMOL/L (ref 20–32)
CREAT SERPL-MCNC: 0.75 MG/DL (ref 0.52–1.04)
ERYTHROCYTE [DISTWIDTH] IN BLOOD BY AUTOMATED COUNT: 13.9 % (ref 10–15)
FERRITIN SERPL-MCNC: 34 NG/ML (ref 8–252)
GFR SERPL CREATININE-BSD FRML MDRD: 77 ML/MIN/1.73M2
GLUCOSE BLD-MCNC: 109 MG/DL (ref 70–99)
HCT VFR BLD AUTO: 38.8 % (ref 35–47)
HCV AB SERPL QL IA: NONREACTIVE
HGB BLD-MCNC: 12.9 G/DL (ref 11.7–15.7)
MCH RBC QN AUTO: 29.1 PG (ref 26.5–33)
MCHC RBC AUTO-ENTMCNC: 33.2 G/DL (ref 31.5–36.5)
MCV RBC AUTO: 87 FL (ref 78–100)
PLATELET # BLD AUTO: 192 10E3/UL (ref 150–450)
POTASSIUM BLD-SCNC: 3.7 MMOL/L (ref 3.4–5.3)
RBC # BLD AUTO: 4.44 10E6/UL (ref 3.8–5.2)
SODIUM SERPL-SCNC: 133 MMOL/L (ref 133–144)
WBC # BLD AUTO: 5.9 10E3/UL (ref 4–11)

## 2021-11-05 PROCEDURE — 86803 HEPATITIS C AB TEST: CPT

## 2021-11-05 PROCEDURE — 85027 COMPLETE CBC AUTOMATED: CPT

## 2021-11-05 PROCEDURE — 80048 BASIC METABOLIC PNL TOTAL CA: CPT

## 2021-11-05 PROCEDURE — 82728 ASSAY OF FERRITIN: CPT

## 2021-11-05 PROCEDURE — 36415 COLL VENOUS BLD VENIPUNCTURE: CPT

## 2021-11-08 NOTE — RESULT ENCOUNTER NOTE
Mary Morales,    This is to inform you regarding your test result.    Hepatitis C Antibody is negative.  Ferritin which is iron stores in the body is low.  We want Ferritin level greater than 100  Take OTC Ferrous Sulfate 325 mg po once daily or every other day if you tolerate.  Take with vit C as vit C helps to absorb iron.  Iron can make you constipated so take stool softener.  Recheck ferritin in 4 months  Basic metabolic panel which includes electrolytes and kidney fucntion is normal  Glucose which is your blood sugar is slightly elevated.  CBC result which includes Hemoglobin and  Platelet Counts is satisfactory.          Sincerely,      Dr.Nasima Tonya MD,FACP

## 2021-11-15 DIAGNOSIS — J45.20 INTERMITTENT ASTHMA, UNCOMPLICATED: ICD-10-CM

## 2021-11-15 DIAGNOSIS — F51.01 PRIMARY INSOMNIA: ICD-10-CM

## 2021-11-15 DIAGNOSIS — I10 ESSENTIAL HYPERTENSION: ICD-10-CM

## 2021-11-16 RX ORDER — MONTELUKAST SODIUM 10 MG/1
TABLET ORAL
Qty: 90 TABLET | Refills: 0 | Status: SHIPPED | OUTPATIENT
Start: 2021-11-16 | End: 2021-12-21

## 2021-11-16 RX ORDER — QUETIAPINE FUMARATE 100 MG/1
150 TABLET, FILM COATED ORAL AT BEDTIME
Qty: 135 TABLET | Refills: 0 | Status: SHIPPED | OUTPATIENT
Start: 2021-11-16 | End: 2021-12-21

## 2021-11-16 RX ORDER — CLONIDINE HYDROCHLORIDE 0.2 MG/1
0.2 TABLET ORAL 2 TIMES DAILY
Qty: 180 TABLET | Refills: 0 | Status: SHIPPED | OUTPATIENT
Start: 2021-11-16 | End: 2021-12-21

## 2021-12-21 ENCOUNTER — OFFICE VISIT (OUTPATIENT)
Dept: FAMILY MEDICINE | Facility: CLINIC | Age: 76
End: 2021-12-21
Payer: MEDICARE

## 2021-12-21 VITALS
TEMPERATURE: 97.8 F | HEART RATE: 59 BPM | HEIGHT: 62 IN | WEIGHT: 173 LBS | SYSTOLIC BLOOD PRESSURE: 134 MMHG | OXYGEN SATURATION: 97 % | RESPIRATION RATE: 16 BRPM | DIASTOLIC BLOOD PRESSURE: 79 MMHG | BODY MASS INDEX: 31.83 KG/M2

## 2021-12-21 DIAGNOSIS — E61.1 IRON DEFICIENCY: ICD-10-CM

## 2021-12-21 DIAGNOSIS — F51.01 PRIMARY INSOMNIA: ICD-10-CM

## 2021-12-21 DIAGNOSIS — M54.9 UPPER BACK PAIN: ICD-10-CM

## 2021-12-21 DIAGNOSIS — I10 ESSENTIAL HYPERTENSION: Primary | ICD-10-CM

## 2021-12-21 DIAGNOSIS — Z12.11 SCREEN FOR COLON CANCER: ICD-10-CM

## 2021-12-21 DIAGNOSIS — E78.5 HYPERLIPIDEMIA, UNSPECIFIED HYPERLIPIDEMIA TYPE: ICD-10-CM

## 2021-12-21 DIAGNOSIS — J45.20 INTERMITTENT ASTHMA, UNCOMPLICATED: ICD-10-CM

## 2021-12-21 PROCEDURE — 99214 OFFICE O/P EST MOD 30 MIN: CPT | Performed by: INTERNAL MEDICINE

## 2021-12-21 RX ORDER — QUETIAPINE FUMARATE 100 MG/1
150 TABLET, FILM COATED ORAL AT BEDTIME
Qty: 135 TABLET | Refills: 3 | Status: SHIPPED | OUTPATIENT
Start: 2021-12-21 | End: 2023-03-20

## 2021-12-21 RX ORDER — LISINOPRIL 40 MG/1
40 TABLET ORAL DAILY
Qty: 90 TABLET | Refills: 3 | Status: SHIPPED | OUTPATIENT
Start: 2021-12-21 | End: 2023-02-07

## 2021-12-21 RX ORDER — TIZANIDINE 2 MG/1
2-4 TABLET ORAL DAILY PRN
Qty: 60 TABLET | Refills: 3 | Status: SHIPPED | OUTPATIENT
Start: 2021-12-21 | End: 2022-04-11

## 2021-12-21 RX ORDER — CLONIDINE HYDROCHLORIDE 0.2 MG/1
0.2 TABLET ORAL 2 TIMES DAILY
Qty: 180 TABLET | Refills: 1 | Status: SHIPPED | OUTPATIENT
Start: 2021-12-21 | End: 2022-09-12

## 2021-12-21 RX ORDER — ROSUVASTATIN CALCIUM 5 MG/1
5 TABLET, COATED ORAL DAILY
Qty: 90 TABLET | Refills: 3 | Status: SHIPPED | OUTPATIENT
Start: 2021-12-21 | End: 2023-03-20

## 2021-12-21 RX ORDER — MONTELUKAST SODIUM 10 MG/1
1 TABLET ORAL AT BEDTIME
Qty: 90 TABLET | Refills: 3 | Status: SHIPPED | OUTPATIENT
Start: 2021-12-21 | End: 2023-03-06

## 2021-12-21 ASSESSMENT — PAIN SCALES - GENERAL: PAINLEVEL: NO PAIN (0)

## 2021-12-21 ASSESSMENT — MIFFLIN-ST. JEOR: SCORE: 1217.97

## 2021-12-21 NOTE — PATIENT INSTRUCTIONS
Monitor your blood pressure once a week  at home.  Bring those readings on your next visit.  Notify us if your blood pressure readings consistently stays greater than 140/90.  There is this is a new shingles vaccine available called shingrex  It is a series of 2 shots 2-6 months apart.  Considered more than 90% effective.  Please go to any pharmacy to get the  vaccine   You are due for mammogram.  Please call the following number to make appointment :  [phone]  It is located in [address]

## 2021-12-21 NOTE — PROGRESS NOTES
"  Assessment & Plan   Carmen was seen today for follow up and hypertension.    Diagnoses and all orders for this visit:    Essential hypertension  -     cloNIDine (CATAPRES) 0.2 MG tablet; Take 1 tablet (0.2 mg) by mouth 2 times daily for Blood Pressure  -     lisinopril (ZESTRIL) 40 MG tablet; Take 1 tablet (40 mg) by mouth daily for Blood Pressure  At home it is under very good control  She brought her home blood pressure readings and I reviewed those and they are under very good control  She is tolerating that well   continue present treatment      Intermittent asthma, uncomplicated  -     montelukast (SINGULAIR) 10 MG tablet; Take 1 tablet (10 mg) by mouth At Bedtime   well controlled    Primary insomnia  -     QUEtiapine (SEROQUEL) 100 MG tablet; Take 1.5 tablets (150 mg) by mouth At Bedtime for sleep  Doing well with current medication    Hyperlipidemia, unspecified hyperlipidemia type  -     rosuvastatin (CRESTOR) 5 MG tablet; Take 1 tablet (5 mg) by mouth daily for cholestrol    Iron deficiency  Comments:  declined for work up  She is eating diet iron as she cannot tolerate oral iron even she tried lower dose    Upper back pain  -     tiZANidine (ZANAFLEX) 2 MG tablet; Take 1-2 tablets (2-4 mg) by mouth daily as needed for muscle spasms    Screen for colon cancer  -     Fecal colorectal cancer screen (FIT); Future  -     Fecal colorectal cancer screen (FIT)    She has declined work-up for iron deficiency but agreed for doing fit test which she has done in the past and were negative      BMI:   Estimated body mass index is 31.64 kg/m  as calculated from the following:    Height as of this encounter: 1.575 m (5' 2\").    Weight as of this encounter: 78.5 kg (173 lb).   Weight management plan: Discussed healthy diet and exercise guidelines    See Patient Instructions  Patient Instructions   Monitor your blood pressure once a week  at home.  Bring those readings on your next visit.  Notify us if your blood " "pressure readings consistently stays greater than 140/90.  There is this is a new shingles vaccine available called shingrex  It is a series of 2 shots 2-6 months apart.  Considered more than 90% effective.  Please go to any pharmacy to get the  vaccine   You are due for mammogram.  Please call the following number to make appointment :  [phone]  It is located in [address]            Return in about 4 months (around 4/21/2022) for wellness visit.    Maribell Castaneda MD  [facility] MILO Morales is a 76 year old who presents for the following health issues    History of Present Illness       She eats 2-3 servings of fruits and vegetables daily.She consumes 0 sweetened beverage(s) daily.She exercises with enough effort to increase her heart rate 10 to 19 minutes per day.  She exercises with enough effort to increase her heart rate 6 days per week.   She is taking medications regularly.     Does not want iron pills  Eating food rich in iron        Review of Systems   Constitutional, HEENT, cardiovascular, pulmonary, GI, , musculoskeletal, neuro, skin, endocrine and psych systems are negative, except as otherwise noted.      Objective    /79   Pulse 59   Temp 97.8  F (36.6  C) (Temporal)   Resp 16   Ht 1.575 m (5' 2\")   Wt 78.5 kg (173 lb)   SpO2 97%   BMI 31.64 kg/m    Body mass index is 31.64 kg/m .  Physical Exam       She is very nice and pleasant.  She is comfortable and not in any kind of distress.  She is fully alert awake oriented.      Disclaimer: This note consists of symbols derived from keyboarding, dictation and/or voice recognition software. As a result, there may be errors in the script that have gone undetected. Please consider this when interpreting information found in this chart.              "

## 2021-12-22 ASSESSMENT — ASTHMA QUESTIONNAIRES: ACT_TOTALSCORE: 23

## 2021-12-29 ENCOUNTER — E-VISIT (OUTPATIENT)
Dept: FAMILY MEDICINE | Facility: CLINIC | Age: 76
End: 2021-12-29
Payer: MEDICARE

## 2021-12-29 DIAGNOSIS — N39.0 ACUTE UTI (URINARY TRACT INFECTION): Primary | ICD-10-CM

## 2021-12-29 PROCEDURE — 99421 OL DIG E/M SVC 5-10 MIN: CPT | Performed by: INTERNAL MEDICINE

## 2021-12-30 NOTE — PATIENT INSTRUCTIONS
Dear Carmen Bonner    After reviewing your responses, I've been able to diagnose you with a urinary tract infection, which is a common infection of the bladder with bacteria.  This is not a sexually transmitted infection, though urinating immediately after intercourse can help prevent infections.  Drinking lots of fluids is also helpful to clear your current infection and prevent the next one.      I have sent a prescription for antibiotics to your pharmacy to treat this infection.    It is important that you take all of your prescribed medication even if your symptoms are improving after a few doses.  Taking all of your medicine helps prevent the symptoms from returning.     If your symptoms worsen, you develop pain in your back or stomach, develop fevers, or are not improving in 5 days, please contact your primary care provider for an appointment or visit any of our convenient Walk-in or Urgent Care Centers to be seen, which can be found on our website here.    Thanks again for choosing us as your health care partner,    DIXON LEIGH MD

## 2021-12-31 ENCOUNTER — LAB (OUTPATIENT)
Dept: LAB | Facility: CLINIC | Age: 76
End: 2021-12-31
Payer: MEDICARE

## 2021-12-31 DIAGNOSIS — N39.0 ACUTE UTI (URINARY TRACT INFECTION): ICD-10-CM

## 2021-12-31 LAB
ALBUMIN UR-MCNC: NEGATIVE MG/DL
APPEARANCE UR: CLEAR
BACTERIA #/AREA URNS HPF: ABNORMAL /HPF
BILIRUB UR QL STRIP: NEGATIVE
COLOR UR AUTO: YELLOW
GLUCOSE UR STRIP-MCNC: NEGATIVE MG/DL
HGB UR QL STRIP: ABNORMAL
KETONES UR STRIP-MCNC: NEGATIVE MG/DL
LEUKOCYTE ESTERASE UR QL STRIP: ABNORMAL
NITRATE UR QL: NEGATIVE
PH UR STRIP: 6 [PH] (ref 5–7)
RBC #/AREA URNS AUTO: ABNORMAL /HPF
SP GR UR STRIP: 1.01 (ref 1–1.03)
SQUAMOUS #/AREA URNS AUTO: ABNORMAL /LPF
UROBILINOGEN UR STRIP-ACNC: 0.2 E.U./DL
WBC #/AREA URNS AUTO: ABNORMAL /HPF

## 2021-12-31 PROCEDURE — 81001 URINALYSIS AUTO W/SCOPE: CPT

## 2022-01-03 ENCOUNTER — E-VISIT (OUTPATIENT)
Dept: FAMILY MEDICINE | Facility: CLINIC | Age: 77
End: 2022-01-03
Payer: MEDICARE

## 2022-01-03 DIAGNOSIS — J02.9 SORE THROAT: Primary | ICD-10-CM

## 2022-01-03 DIAGNOSIS — Z11.52 ENCOUNTER FOR SCREENING FOR COVID-19: ICD-10-CM

## 2022-01-03 PROCEDURE — 99421 OL DIG E/M SVC 5-10 MIN: CPT | Performed by: INTERNAL MEDICINE

## 2022-01-03 NOTE — TELEPHONE ENCOUNTER
Provider E-Visit time total (minutes): 7 minutes    I spoke to the patient   Complaining of sore throat for 5 days  Wet cough  Temp is 97  Someone had covid in her building   She would like to make sure that she does not have a Covid  I explained the results of urinalysis  I recommended symptomatic treatment  Drink enough fluid  Do warm saline gargles  Take Tylenol as needed  Let me know if the color of phlegm changes and is colored and symptoms persist we may consider giving you antibiotic  Please a schedule your Covid test and rapid strep test  Dr.Nasima Tonya MD

## 2022-01-06 ENCOUNTER — LAB (OUTPATIENT)
Dept: URGENT CARE | Facility: URGENT CARE | Age: 77
End: 2022-01-06
Attending: INTERNAL MEDICINE
Payer: MEDICARE

## 2022-01-06 DIAGNOSIS — J02.9 SORE THROAT: ICD-10-CM

## 2022-01-06 DIAGNOSIS — Z11.52 ENCOUNTER FOR SCREENING FOR COVID-19: ICD-10-CM

## 2022-01-06 LAB
DEPRECATED S PYO AG THROAT QL EIA: NEGATIVE
GROUP A STREP BY PCR: NOT DETECTED
SARS-COV-2 RNA RESP QL NAA+PROBE: NEGATIVE

## 2022-01-06 PROCEDURE — U0005 INFEC AGEN DETEC AMPLI PROBE: HCPCS

## 2022-01-06 PROCEDURE — 87651 STREP A DNA AMP PROBE: CPT

## 2022-01-06 PROCEDURE — U0003 INFECTIOUS AGENT DETECTION BY NUCLEIC ACID (DNA OR RNA); SEVERE ACUTE RESPIRATORY SYNDROME CORONAVIRUS 2 (SARS-COV-2) (CORONAVIRUS DISEASE [COVID-19]), AMPLIFIED PROBE TECHNIQUE, MAKING USE OF HIGH THROUGHPUT TECHNOLOGIES AS DESCRIBED BY CMS-2020-01-R: HCPCS

## 2022-01-06 NOTE — RESULT ENCOUNTER NOTE
Mary Morales,    This is to inform you regarding your test result.    Rapid strep test is negative    Sincerely,      Dr.Nasima Tonya MD,FACP

## 2022-01-07 NOTE — RESULT ENCOUNTER NOTE
Mary Morales,    This is to inform you regarding your test result.    Your PCR test for Covid-19 was negative .          Sincerely,      Dr.Nasima Tonya MD,FACP

## 2022-01-28 ENCOUNTER — VIRTUAL VISIT (OUTPATIENT)
Dept: FAMILY MEDICINE | Facility: CLINIC | Age: 77
End: 2022-01-28
Payer: MEDICARE

## 2022-01-28 DIAGNOSIS — N95.2 ATROPHIC VAGINITIS: ICD-10-CM

## 2022-01-28 DIAGNOSIS — N39.0 RECURRENT URINARY TRACT INFECTION: Primary | ICD-10-CM

## 2022-01-28 DIAGNOSIS — B37.31 YEAST INFECTION OF THE VAGINA: ICD-10-CM

## 2022-01-28 PROCEDURE — 99441 PR PHYSICIAN TELEPHONE EVALUATION 5-10 MIN: CPT | Mod: 59 | Performed by: INTERNAL MEDICINE

## 2022-01-28 RX ORDER — FLUCONAZOLE 150 MG/1
150 TABLET ORAL
Qty: 1 TABLET | Refills: 1 | Status: SHIPPED | OUTPATIENT
Start: 2022-01-28 | End: 2022-04-25

## 2022-01-28 NOTE — PROGRESS NOTES
Carmen is a 76 year old who is being evaluated via a billable telephone visit.      What phone number would you like to be contacted at? [phone]  How would you like to obtain your AVS? Verbal instructions given    Assessment & Plan     Carmen was seen today for referral and uti.    Diagnoses and all orders for this visit:    Recurrent urinary tract infection  -     Adult Urology Referral; Future  -     conjugated estrogens (PREMARIN) 0.625 MG/GM vaginal cream; Place 1 g vaginally twice a week  Patient had recurrent UTI  She had at least 4 UTI in the last few months  She was seen at [facility] also  She also has vaginal yeast infection  She is done with her antibiotics  UTI symptoms have improved  But due to recurrent UTI [facility] recommended her that she should see a urologist  Referral is placed and phone number provided  I also prescribed Premarin cream to help to prevent UTIs  Educated her to use half to 1 applicator twice a week  Put little bit around urethra also    Yeast infection of the vagina  -     fluconazole (DIFLUCAN) 150 MG tablet; Take 1 tablet (150 mg) by mouth once as needed  This she can use as needed    Atrophic vaginitis  -     conjugated estrogens (PREMARIN) 0.625 MG/GM vaginal cream; Place 1 g vaginally twice a week    Disclaimer: This note consists of symbols derived from keyboarding, dictation and/or voice recognition software. As a result, there may be errors in the script that have gone undetected. Please consider this when interpreting information found in this chart.      Verbal instructions given  See Patient Instructions  Patient Instructions   Stay well-hydrated  Avoid dehydration  Use Diflucan 150 mg single dose as needed for yeast infection  Referred you to urology for recurrent UTI  I have prescribed Premarin vaginal cream half to 1 applicator twice a week  You can put a small quantity around the urethra also with tip of your finger  Keep your appointment for April 21 as a  scheduled  Seek sooner medical attention if there is any worsening of symptoms or problems.        Return in about 3 months (around 4/21/2022) for medication follow up.    Maribell Castaneda MD  [facility] MILO Morales is a 76 year old who presents for the following health issues     HPI     Discuss frequent UTI's and recent yeast infection. Was seen at [facility] on 1/13/22, given fluconazole (2 tablets) and doxycycline. Still having issues with yeast infection, feels that treatment was not enough, requesting additional tablets of fluconazole if appropriate. Also discuss referral to urology due to recurring UTI's.           Review of Systems   Constitutional, HEENT, cardiovascular, pulmonary, gi and gu systems are negative, except as otherwise noted.      Objective           Vitals:  No vitals were obtained today due to virtual visit.    Physical Exam   healthy, alert and no distress  PSYCH: Alert and oriented times 3; coherent speech, normal   rate and volume, able to articulate logical thoughts, able   to abstract reason, no tangential thoughts, no hallucinations   or delusions  Her affect is normal  RESP: No cough, no audible wheezing, able to talk in full sentences  Remainder of exam unable to be completed due to telephone visits                Phone call duration: 7 minutes

## 2022-01-28 NOTE — PATIENT INSTRUCTIONS
Stay well-hydrated  Avoid dehydration  Use Diflucan 150 mg single dose as needed for yeast infection  Referred you to urology for recurrent UTI  I have prescribed Premarin vaginal cream half to 1 applicator twice a week  You can put a small quantity around the urethra also with tip of your finger  Keep your appointment for April 21 as a scheduled  Seek sooner medical attention if there is any worsening of symptoms or problems.

## 2022-03-02 ENCOUNTER — OFFICE VISIT (OUTPATIENT)
Dept: UROLOGY | Facility: CLINIC | Age: 77
End: 2022-03-02
Attending: INTERNAL MEDICINE
Payer: MEDICARE

## 2022-03-02 VITALS
WEIGHT: 166 LBS | HEIGHT: 62 IN | OXYGEN SATURATION: 99 % | DIASTOLIC BLOOD PRESSURE: 86 MMHG | SYSTOLIC BLOOD PRESSURE: 162 MMHG | BODY MASS INDEX: 30.55 KG/M2 | HEART RATE: 74 BPM

## 2022-03-02 DIAGNOSIS — R30.0 DYSURIA: Primary | ICD-10-CM

## 2022-03-02 DIAGNOSIS — N39.0 RECURRENT URINARY TRACT INFECTION: ICD-10-CM

## 2022-03-02 LAB
ALBUMIN UR-MCNC: 100 MG/DL
APPEARANCE UR: CLEAR
BILIRUB UR QL STRIP: NEGATIVE
COLOR UR AUTO: YELLOW
GLUCOSE UR STRIP-MCNC: NEGATIVE MG/DL
HGB UR QL STRIP: ABNORMAL
KETONES UR STRIP-MCNC: NEGATIVE MG/DL
LEUKOCYTE ESTERASE UR QL STRIP: ABNORMAL
NITRATE UR QL: NEGATIVE
PH UR STRIP: 7 [PH] (ref 5–7)
RESIDUAL VOLUME (RV) (EXTERNAL): 47
SP GR UR STRIP: 1.01 (ref 1–1.03)
UROBILINOGEN UR STRIP-ACNC: 0.2 E.U./DL

## 2022-03-02 PROCEDURE — 51798 US URINE CAPACITY MEASURE: CPT | Performed by: PHYSICIAN ASSISTANT

## 2022-03-02 PROCEDURE — 81003 URINALYSIS AUTO W/O SCOPE: CPT | Mod: QW | Performed by: PHYSICIAN ASSISTANT

## 2022-03-02 PROCEDURE — 99203 OFFICE O/P NEW LOW 30 MIN: CPT | Mod: 25 | Performed by: PHYSICIAN ASSISTANT

## 2022-03-02 ASSESSMENT — PAIN SCALES - GENERAL: PAINLEVEL: NO PAIN (0)

## 2022-03-02 NOTE — PROGRESS NOTES
"2022      CC: Painful urination    HPI:  Carmen Bonner is a 76 year old female who presents for consultation from Dr. Castaneda for evaluation of the above.  Has had several [facility] and [facility] visits with no cultures avail. She thinks there may have been some e coli cultures. Lived in [place] and [place] recently and had similar issues there. Saw a urologist and had a cysto (hx micro hematuria). \"No reason for the blood in the urine\".    No gross hematuria, pelvic or abdominal pain, hx of stones. The one urine culture avail is negative. Started on Premarin 1 mo ago and BID cran tabs. Feeling overall better from urinary standpoint.     Past Medical History:   Diagnosis Date     Allergic rhinitis      Anxiety     on seroquel     Asthma      Cervicalgia      Chronic pain      Diabetes (H)     now diet controlled     DJD (degenerative joint disease) of lumbar spine     CHRONIC HAS SEEN PAIN CLINIC- put on tizanidine      Endometriosis      Finger pain      Finger swelling      Flank pain      GERD (gastroesophageal reflux disease)      Hematuria      Herpes      Hypertension      Hyponatremia      Insomnia     on seroquel     Insomnia      Knee pain      Mumps      Purpura (H)     SAW DERM      Recurrent UTI      Skin cancer     basal cell      Stress incontinence      Upper back pain        Past Surgical History:   Procedure Laterality Date     CHOLECYSTECTOMY       CYSTOSCOPY       HYSTERECTOMY TOTAL ABDOMINAL         Social History     Socioeconomic History     Marital status: Single     Spouse name: Not on file     Number of children: Not on file     Years of education: Not on file     Highest education level: Not on file   Occupational History     Not on file   Tobacco Use     Smoking status: Former Smoker     Packs/day: 1.00     Years: 26.00     Pack years: 26.00     Quit date:      Years since quittin.1     Smokeless tobacco: Never Used   Substance and Sexual Activity     Alcohol use: No     Alcohol/week: " 0.0 standard drinks     Comment: Attends AA Meetings, sober since 1996     Drug use: No     Sexual activity: Never   Other Topics Concern     Parent/sibling w/ CABG, MI or angioplasty before 65F 55M? No   Social History Narrative    Health maintenance             ADVANCED DIRECTIVE PAMPHLET     COLONOSCOPY     DEXA     ANNUAL WELLNESS VISIT    ASTHMA CONTROL TEST/ ACTION PLAN             LOOSE STOOLS       Duration: frequent looser stools / gurgling noises        No improvement with the fodmaps diet        Better off nexium/         Now on prevacid          Associated flank pain: None      Intensity:  moderate      Accompanying signs and symptoms:         Fever/Chills: no         Gas/Bloating: YES         Nausea/vomitting: no         Diarrhea: YES         Dysuria or Hematuria: no      Now regulating it with diet      Precipitating or alleviating factors:         Pain worse with eating/BM/urination: yes            Therapies tried and outcome: fodmap diet        LMP:  not applicable                         Back Pain            Duration:      HAS CHRONIC LOW BACK PAIN HAS BEEN TO PAIN CLINIC  WAS PUT ON TIZANIDINE  DOES REGULAR BACK EXERCISES  Takes up to 2  A day of tizanidine with tylenol MRI - showed mild to moderate degenerative changes        NEW PAIN UPPER BACK  Had it a few time over the last 2-3 years        3 months, worsening with yoga         Specific cause: none      Description (location/character/radiation): mid back        Intensity:  moderate, severe SHARP PAIN  SAME ON BOTH SIDE        Accompanying signs and symptoms (weakness/fever/urinary symptoms): none      History (similar episodes/surgery/injury):        Precipitating or alleviating factors: YOGA        Therapies tried and outcome: exercise, tylenol       Social Determinants of Health     Financial Resource Strain: Not on file   Food Insecurity: Not on file   Transportation Needs: Not on file   Physical Activity: Not on file   Stress: Not on file    Social Connections: Not on file   Intimate Partner Violence: Not on file   Housing Stability: Not on file       Family History   Problem Relation Age of Onset     Cancer Mother      Osteoporosis Mother      Cerebrovascular Disease Father      Alzheimer Disease Father      Genitourinary Problems Maternal Grandmother      Heart Disease Paternal Grandfather      Osteoporosis Sister      Arthritis Sister      Breast Cancer Paternal Grandmother      Colon Cancer No family hx of        ROS:14 point ROS neg other than the symptoms noted above in the HPI.    Allergies   Allergen Reactions     Hctz [Hydrochlorothiazide]      Low sodiume     Lexapro [Escitalopram]      nausea     Omeprazole Diarrhea     Sulfa Drugs Itching     Venlafaxine Nausea     Amlodipine Swelling     swelling of foot       Current Outpatient Medications   Medication     acetaminophen (ACETAMINOPHEN EXTRA STRENGTH) 500 MG tablet     albuterol (PROAIR HFA) 108 (90 Base) MCG/ACT inhaler     cholecalciferol 25 MCG (1000 UT) CHEW     cloNIDine (CATAPRES) 0.2 MG tablet     conjugated estrogens (PREMARIN) 0.625 MG/GM vaginal cream     Cranberry 140-100-3 MG-MG-UNIT CAPS     estradiol (ESTRACE) 0.1 MG/GM vaginal cream     fluconazole (DIFLUCAN) 150 MG tablet     lisinopril (ZESTRIL) 40 MG tablet     mometasone (NASONEX) 50 MCG/ACT nasal spray     montelukast (SINGULAIR) 10 MG tablet     omeprazole (PRILOSEC OTC) 20 MG EC tablet     QUEtiapine (SEROQUEL) 100 MG tablet     rosuvastatin (CRESTOR) 5 MG tablet     tiZANidine (ZANAFLEX) 2 MG tablet     triamcinolone (KENALOG) 0.1 % cream     UNABLE TO FIND     valACYclovir (VALTREX) 1000 mg tablet     No current facility-administered medications for this visit.         PEx:   not currently breastfeeding.  GEN: NAD  EYES: EOMI  MOUTH: MMM  NECK: Supple    NEURO: AAO    Urine: negative  PVR: cc    A/P: Carmen Bonner is a 76 year old female with dysuria, poss rUTIs.  - U/A, UC when symptoms  -Continue estrogen cream,  this will treat atrophic vaginitis and urethral changes which may impact her pelvic discomfort and improve her urinary urgency. Added bonus of UTI prevention. Estrace vs cmpd with refill to lessen her cost.   - continue BID cran tabs  - Hydrate  - Lifestyle and hygiene modifications were reviewed today in clinic, including wiping front to back, wearing cotton breathable underwear, voiding before and after intercourse to flush the urethra, minimizing baths and opting for showers, and appropriate perineal hygiene.   -3 mo recheck  Sarah Herr PA-C  [facility]        28 minutes spent on the date of the encounter doing chart review, review of outside records, review of test results, interpretation of tests, patient visit and documentation

## 2022-03-02 NOTE — LETTER
"3/2/2022       RE: Carmen Bonner  [address]  [facility] [address]     Dear Colleague,    Thank you for referring your patient, Carmen Bonner, to the [facility] [place] at [facility]. Please see a copy of my visit note below.    March 2, 2022      CC: Painful urination    HPI:  Carmen Bonner is a 76 year old female who presents for consultation from Dr. Castaneda for evaluation of the above.  Has had several [facility] and [facility] visits with no cultures avail. She thinks there may have been some e coli cultures. Lived in [place] and [place] recently and had similar issues there. Saw a urologist and had a cysto (hx micro hematuria). \"No reason for the blood in the urine\".    No gross hematuria, pelvic or abdominal pain, hx of stones. The one urine culture avail is negative. Started on Premarin 1 mo ago and BID cran tabs. Feeling overall better from urinary standpoint.     Past Medical History:   Diagnosis Date     Allergic rhinitis      Anxiety     on seroquel     Asthma      Cervicalgia      Chronic pain      Diabetes (H)     now diet controlled     DJD (degenerative joint disease) of lumbar spine     CHRONIC HAS SEEN PAIN CLINIC- put on tizanidine      Endometriosis      Finger pain      Finger swelling      Flank pain      GERD (gastroesophageal reflux disease)      Hematuria      Herpes      Hypertension      Hyponatremia      Insomnia     on seroquel     Insomnia      Knee pain      Mumps      Purpura (H)     SAW DERM      Recurrent UTI      Skin cancer     basal cell      Stress incontinence      Upper back pain        Past Surgical History:   Procedure Laterality Date     CHOLECYSTECTOMY       CYSTOSCOPY       HYSTERECTOMY TOTAL ABDOMINAL         Social History     Socioeconomic History     Marital status: Single     Spouse name: Not on file     Number of children: Not on file     Years of education: Not on file     Highest education level: " Not on file   Occupational History     Not on file   Tobacco Use     Smoking status: Former Smoker     Packs/day: 1.00     Years: 26.00     Pack years: 26.00     Quit date:      Years since quittin.1     Smokeless tobacco: Never Used   Substance and Sexual Activity     Alcohol use: No     Alcohol/week: 0.0 standard drinks     Comment: Attends AA Meetings, sober since      Drug use: No     Sexual activity: Never   Other Topics Concern     Parent/sibling w/ CABG, MI or angioplasty before 65F 55M? No   Social History Narrative    Health maintenance             ADVANCED DIRECTIVE PAMPHLET     COLONOSCOPY     DEXA     ANNUAL WELLNESS VISIT    ASTHMA CONTROL TEST/ ACTION PLAN             LOOSE STOOLS       Duration: frequent looser stools / gurgling noises        No improvement with the fodmaps diet        Better off nexium/         Now on prevacid          Associated flank pain: None      Intensity:  moderate      Accompanying signs and symptoms:         Fever/Chills: no         Gas/Bloating: YES         Nausea/vomitting: no         Diarrhea: YES         Dysuria or Hematuria: no      Now regulating it with diet      Precipitating or alleviating factors:         Pain worse with eating/BM/urination: yes            Therapies tried and outcome: fodmap diet        LMP:  not applicable                         Back Pain            Duration:      HAS CHRONIC LOW BACK PAIN HAS BEEN TO PAIN CLINIC  WAS PUT ON TIZANIDINE  DOES REGULAR BACK EXERCISES  Takes up to 2  A day of tizanidine with tylenol MRI - showed mild to moderate degenerative changes        NEW PAIN UPPER BACK  Had it a few time over the last 2-3 years        3 months, worsening with yoga         Specific cause: none      Description (location/character/radiation): mid back        Intensity:  moderate, severe SHARP PAIN  SAME ON BOTH SIDE        Accompanying signs and symptoms (weakness/fever/urinary symptoms): none      History (similar  episodes/surgery/injury):        Precipitating or alleviating factors: YOGA        Therapies tried and outcome: exercise, tylenol       Social Determinants of Health     Financial Resource Strain: Not on file   Food Insecurity: Not on file   Transportation Needs: Not on file   Physical Activity: Not on file   Stress: Not on file   Social Connections: Not on file   Intimate Partner Violence: Not on file   Housing Stability: Not on file       Family History   Problem Relation Age of Onset     Cancer Mother      Osteoporosis Mother      Cerebrovascular Disease Father      Alzheimer Disease Father      Genitourinary Problems Maternal Grandmother      Heart Disease Paternal Grandfather      Osteoporosis Sister      Arthritis Sister      Breast Cancer Paternal Grandmother      Colon Cancer No family hx of        ROS:14 point ROS neg other than the symptoms noted above in the HPI.    Allergies   Allergen Reactions     Hctz [Hydrochlorothiazide]      Low sodiume     Lexapro [Escitalopram]      nausea     Omeprazole Diarrhea     Sulfa Drugs Itching     Venlafaxine Nausea     Amlodipine Swelling     swelling of foot       Current Outpatient Medications   Medication     acetaminophen (ACETAMINOPHEN EXTRA STRENGTH) 500 MG tablet     albuterol (PROAIR HFA) 108 (90 Base) MCG/ACT inhaler     cholecalciferol 25 MCG (1000 UT) CHEW     cloNIDine (CATAPRES) 0.2 MG tablet     conjugated estrogens (PREMARIN) 0.625 MG/GM vaginal cream     Cranberry 140-100-3 MG-MG-UNIT CAPS     estradiol (ESTRACE) 0.1 MG/GM vaginal cream     fluconazole (DIFLUCAN) 150 MG tablet     lisinopril (ZESTRIL) 40 MG tablet     mometasone (NASONEX) 50 MCG/ACT nasal spray     montelukast (SINGULAIR) 10 MG tablet     omeprazole (PRILOSEC OTC) 20 MG EC tablet     QUEtiapine (SEROQUEL) 100 MG tablet     rosuvastatin (CRESTOR) 5 MG tablet     tiZANidine (ZANAFLEX) 2 MG tablet     triamcinolone (KENALOG) 0.1 % cream     UNABLE TO FIND     valACYclovir (VALTREX) 1000 mg  tablet     No current facility-administered medications for this visit.         PEx:   not currently breastfeeding.  GEN: NAD  EYES: EOMI  MOUTH: MMM  NECK: Supple    NEURO: AAO    Urine: negative  PVR: cc    A/P: Carmen Bonner is a 76 year old female with dysuria, poss rUTIs.  - U/A, UC when symptoms  -Continue estrogen cream, this will treat atrophic vaginitis and urethral changes which may impact her pelvic discomfort and improve her urinary urgency. Added bonus of UTI prevention. Estrace vs cmpd with refill to lessen her cost.   - continue BID cran tabs  - Hydrate  - Lifestyle and hygiene modifications were reviewed today in clinic, including wiping front to back, wearing cotton breathable underwear, voiding before and after intercourse to flush the urethra, minimizing baths and opting for showers, and appropriate perineal hygiene.   -3 mo recheck  Sarah Herr PA-C  [facility]        28 minutes spent on the date of the encounter doing chart review, review of outside records, review of test results, interpretation of tests, patient visit and documentation

## 2022-03-02 NOTE — NURSING NOTE
Chief Complaint   Patient presents with     Recurring UTIs   Patient denies having UTI symptoms today. PVR was 47 ml.  Hailey Corey LPN

## 2022-03-02 NOTE — PATIENT INSTRUCTIONS
Estrogen cream three times a week near urethra (pea-sized amount): Let me know if you need a refill through pharmacy. If >$50, let me know and we can get via a compound pharmacy.    Standing order for UA/Urine culture at any [facility] lab.

## 2022-03-17 DIAGNOSIS — M54.9 UPPER BACK PAIN: ICD-10-CM

## 2022-03-17 RX ORDER — TIZANIDINE 2 MG/1
TABLET ORAL
Qty: 180 TABLET | Refills: 1 | OUTPATIENT
Start: 2022-03-17

## 2022-03-31 ENCOUNTER — LAB (OUTPATIENT)
Dept: LAB | Facility: CLINIC | Age: 77
End: 2022-03-31
Payer: MEDICARE

## 2022-03-31 DIAGNOSIS — N39.0 RECURRENT URINARY TRACT INFECTION: ICD-10-CM

## 2022-03-31 DIAGNOSIS — R30.0 DYSURIA: ICD-10-CM

## 2022-03-31 LAB
ALBUMIN UR-MCNC: NEGATIVE MG/DL
APPEARANCE UR: CLEAR
BILIRUB UR QL STRIP: NEGATIVE
COLOR UR AUTO: YELLOW
GLUCOSE UR STRIP-MCNC: NEGATIVE MG/DL
HGB UR QL STRIP: ABNORMAL
KETONES UR STRIP-MCNC: NEGATIVE MG/DL
LEUKOCYTE ESTERASE UR QL STRIP: ABNORMAL
NITRATE UR QL: NEGATIVE
PH UR STRIP: 7 [PH] (ref 5–7)
SP GR UR STRIP: 1.01 (ref 1–1.03)
UROBILINOGEN UR STRIP-ACNC: 0.2 E.U./DL

## 2022-03-31 PROCEDURE — 81003 URINALYSIS AUTO W/O SCOPE: CPT

## 2022-03-31 PROCEDURE — 87086 URINE CULTURE/COLONY COUNT: CPT

## 2022-04-01 ENCOUNTER — TELEPHONE (OUTPATIENT)
Dept: UROLOGY | Facility: CLINIC | Age: 77
End: 2022-04-01
Payer: MEDICARE

## 2022-04-01 DIAGNOSIS — R30.0 DYSURIA: Primary | ICD-10-CM

## 2022-04-01 RX ORDER — NITROFURANTOIN 25; 75 MG/1; MG/1
100 CAPSULE ORAL 2 TIMES DAILY
Qty: 6 CAPSULE | Refills: 0 | Status: SHIPPED | OUTPATIENT
Start: 2022-04-01 | End: 2022-04-25

## 2022-04-01 NOTE — TELEPHONE ENCOUNTER
[facility] Call Center    Phone Message    May a detailed message be left on voicemail: yes     Reason for Call: Requesting Results   Name/type of test: UA/UC  Date of test: 03/31/22  Was test done at a location other than [facility] (Please fill in the location if not [facility])?: No    Pt called and is waiting for a call back from her UA/UC yesterday, stated she has been taking Pyridium to get her through the day, pt would like her medication be sent to the following pharmacy:  [id number] - [place], [place] - [address]      Action Taken: Message routed to:  Other: UA Uro    Travel Screening: Not Applicable

## 2022-04-01 NOTE — TELEPHONE ENCOUNTER
Spoke to patient  She is having dysuria . Darryl Smith can start on Macrobid  Bid x 3 days as we wait for culture results . RX sent in

## 2022-04-02 LAB — BACTERIA UR CULT: NORMAL

## 2022-04-09 ENCOUNTER — HEALTH MAINTENANCE LETTER (OUTPATIENT)
Age: 77
End: 2022-04-09

## 2022-04-14 ENCOUNTER — LAB (OUTPATIENT)
Dept: LAB | Facility: CLINIC | Age: 77
End: 2022-04-14
Payer: MEDICARE

## 2022-04-14 PROCEDURE — 82274 ASSAY TEST FOR BLOOD FECAL: CPT | Performed by: INTERNAL MEDICINE

## 2022-04-16 LAB — HEMOCCULT STL QL IA: POSITIVE

## 2022-04-18 ENCOUNTER — TELEPHONE (OUTPATIENT)
Dept: FAMILY MEDICINE | Facility: CLINIC | Age: 77
End: 2022-04-18
Payer: MEDICARE

## 2022-04-18 ENCOUNTER — TELEPHONE (OUTPATIENT)
Dept: GASTROENTEROLOGY | Facility: CLINIC | Age: 77
End: 2022-04-18
Payer: MEDICARE

## 2022-04-18 DIAGNOSIS — R19.5 POSITIVE FIT (FECAL IMMUNOCHEMICAL TEST): Primary | ICD-10-CM

## 2022-04-18 DIAGNOSIS — Z11.59 ENCOUNTER FOR SCREENING FOR OTHER VIRAL DISEASES: Primary | ICD-10-CM

## 2022-04-18 NOTE — TELEPHONE ENCOUNTER
Screening Questions  BlueKIND OF PREP RedLOCATION [review exclusion criteria] GreenSEDATION TYPE  1. Have you had a positive covid test in the last 90 days? N     2. Do you have a legal guardian or medical Power of ?  Are you able to give consent for your medical care?Y (Sedation review/consideration needed)    3. Are you active on mychart? Y    4. What insurance is in the chart? MEDICARE & AARP     3.   Ordering/Referring Provider: PEARL    4. BMI 30.5 [BMI OVER 40-EXTENDED PREP]  If greater than 40 review exclusion criteria [PAC APPT IF @ [facility]]        5.  Respiratory Screening :  [If yes to any of the following HOSPITAL setting only]     Do you use daily home oxygen? N    Do you have mod to severe Obstructive Sleep Apnea? ?  [OKAY @ [facility] SH PH RI]   Do you have Pulmonary Hypertension? N     Do you have UNCONTROLLED asthma? N        6.   Have you had a heart or lung transplant? N      7.   Are you currently on dialysis? N [ If yes, G-PREP & HOSPITAL setting only]     8.   Do you have chronic kidney disease? N [ If yes, G-PREP ]    9.   Have you had a stroke or Transient ischemic attack (TIA - aka  mini stroke ) within 6 months?  N (If yes, please review exclusion criteria)    10.   In the past 6 months, have you had any heart related issues including cardiomyopathy or heart attack? N           If yes, did it require cardiac stenting or other implantable device?       11.   Do you have any implantable devices in your body (pacemaker, defib, LVAD)? N (If yes, please review exclusion criteria)    12.   Do you take nitroglycerin? N           If yes, how often?   (if yes, HOSPITAL setting ONLY)    13.   Are you currently taking any blood thinners? N           [IF YES, INFORM PATIENT TO FOLLOW UP W/ ORDERING PROVIDER FOR BRIDGING INSTRUCTIONS]     14.   Do you have a diagnosis of diabetes? N   [ If yes, G-PREP ]    15.   [FEMALES] Are you currently pregnant?     If yes, how many weeks?     16.   Are  you taking any prescription pain medications on a routine schedule?  N  [ If yes, EXTENDED PREP.] [If yes, MAC]    17.   Do you have any chemical dependencies such as alcohol, street drugs, or methadone?  N [If yes, MAC]    18.   Do you have any history of post-traumatic stress syndrome, severe anxiety or history of psychosis?  HIGH ANXIETY  [If yes, MAC]    19.   Do you transfer independently?  Y    20.  On a regular basis do you go 3-5 days between bowel movements? Y   [ If yes, EXTENDED PREP.]    21.   Preferred LOCAL Pharmacy for Pre Prescription   [id number] - [place], [place] - [address]      Scheduling Details      Caller : Carmen Bonner    (Please ask for phone number if not scheduled by patient)    Type of Procedure Scheduled: COLON  Which Colonoscopy Prep was Sent?: EXTENDED  KHORUTS CF PATIENTS & GROEN'S PATIENTS NEEDS EXTENDED PREP  Surgeon: JOSE  Date of Procedure: 4/28  Location:       Sedation Type: MAC  Conscious Sedation- Needs  for 6 hours after the procedure  MAC/General-Needs  for 24 hours after procedure    Pre-op Required at [facility] [place] [place] and OR for MAC sedation: Y  (advise patient they will need a pre-op prior to procedure -)      Informed patient they will need an adult  Y  Cannot take any type of public or medical transportation alone    Pre-Procedure Cov[facility] or Externally: 4/25/22 @    Confirmed Nurse will call to complete assessment Y    Additional comments: PATIENT'S LAST COLONOSCOPY WAS DIFFICULT DUE TO DIVERTICULI, THEY WERE UNABLE TO VIEW EVERYTHING AND SHE REQUIRED A SECOND PROCEDURE WITH BARIUM

## 2022-04-18 NOTE — RESULT ENCOUNTER NOTE
Mary Morales,    This is to inform you regarding your test result.    I spoke to you on phone but sending you a result note also.  Fecal colorectal cancer screen (FIT) is positive .  I recommend colonoscopy exam  Referral for colonoscopy is placed  Schedule colonoscopy at your earliest convenience by calling the following number:  Carly Pateierge :[phone].  I recommend   You can request him to do your colonoscopy as he is a gastroenterologist .      Sincerely,      Dr.Nasima Tonya MD,FACP

## 2022-04-21 ENCOUNTER — TELEPHONE (OUTPATIENT)
Dept: FAMILY MEDICINE | Facility: CLINIC | Age: 77
End: 2022-04-21
Payer: MEDICARE

## 2022-04-21 NOTE — TELEPHONE ENCOUNTER
Called patient     Can't do VV, she wants to be seen in person so rescheduled annual exam     Appointments in Next Year    Apr 25, 2022  3:00 PM  Pre-procedure Covid with BL COVID TESTING HUB 1  [facility] ([facility] ) [phone]   Jun 08, 2022  1:30 PM  Telephone Visit with Sarah Herr PA-C  [facility] ([facility] ) [phone]   Aug 23, 2022  1:30 PM  (Arrive by 1:10 PM)  Annual Wellness Visit with Maribell Castaneda MD  [facility] ([facility] ) [phone]        Qiana Bone RN  [facility]

## 2022-04-21 NOTE — TELEPHONE ENCOUNTER
Please advise whether patient should keep today's appt or reshedule    Patient has appt today at 1:40 with Dr. Castaneda.     Patient is having cold symptoms: Scratchy throat, runny nose, sinus congestion, and occasional sneezing. Denies fevers or cough.    Advised patient to take home covid test and call clinic back with results.

## 2022-04-25 ENCOUNTER — OFFICE VISIT (OUTPATIENT)
Dept: FAMILY MEDICINE | Facility: CLINIC | Age: 77
End: 2022-04-25
Payer: MEDICARE

## 2022-04-25 VITALS
HEART RATE: 66 BPM | WEIGHT: 170 LBS | DIASTOLIC BLOOD PRESSURE: 68 MMHG | RESPIRATION RATE: 20 BRPM | SYSTOLIC BLOOD PRESSURE: 142 MMHG | TEMPERATURE: 98 F | BODY MASS INDEX: 31.28 KG/M2 | OXYGEN SATURATION: 97 % | HEIGHT: 62 IN

## 2022-04-25 DIAGNOSIS — R19.5 POSITIVE FIT (FECAL IMMUNOCHEMICAL TEST): ICD-10-CM

## 2022-04-25 DIAGNOSIS — R21 RASH: ICD-10-CM

## 2022-04-25 DIAGNOSIS — J45.20 INTERMITTENT ASTHMA, UNCOMPLICATED: ICD-10-CM

## 2022-04-25 DIAGNOSIS — Z01.818 PREOP GENERAL PHYSICAL EXAM: Primary | ICD-10-CM

## 2022-04-25 DIAGNOSIS — G47.30 SLEEP APNEA, UNSPECIFIED TYPE: ICD-10-CM

## 2022-04-25 DIAGNOSIS — N39.0 RECURRENT URINARY TRACT INFECTION: ICD-10-CM

## 2022-04-25 DIAGNOSIS — I10 ESSENTIAL HYPERTENSION: ICD-10-CM

## 2022-04-25 DIAGNOSIS — E78.5 HYPERLIPIDEMIA, UNSPECIFIED HYPERLIPIDEMIA TYPE: ICD-10-CM

## 2022-04-25 DIAGNOSIS — Z86.79 HISTORY OF PAROXYSMAL SUPRAVENTRICULAR TACHYCARDIA: ICD-10-CM

## 2022-04-25 LAB — SARS-COV-2 RNA RESP QL NAA+PROBE: NEGATIVE

## 2022-04-25 PROCEDURE — 80048 BASIC METABOLIC PNL TOTAL CA: CPT | Performed by: PHYSICIAN ASSISTANT

## 2022-04-25 PROCEDURE — 90715 TDAP VACCINE 7 YRS/> IM: CPT | Performed by: PHYSICIAN ASSISTANT

## 2022-04-25 PROCEDURE — U0003 INFECTIOUS AGENT DETECTION BY NUCLEIC ACID (DNA OR RNA); SEVERE ACUTE RESPIRATORY SYNDROME CORONAVIRUS 2 (SARS-COV-2) (CORONAVIRUS DISEASE [COVID-19]), AMPLIFIED PROBE TECHNIQUE, MAKING USE OF HIGH THROUGHPUT TECHNOLOGIES AS DESCRIBED BY CMS-2020-01-R: HCPCS | Performed by: PHYSICIAN ASSISTANT

## 2022-04-25 PROCEDURE — 99214 OFFICE O/P EST MOD 30 MIN: CPT | Mod: 25 | Performed by: PHYSICIAN ASSISTANT

## 2022-04-25 PROCEDURE — 90471 IMMUNIZATION ADMIN: CPT | Performed by: PHYSICIAN ASSISTANT

## 2022-04-25 PROCEDURE — 36415 COLL VENOUS BLD VENIPUNCTURE: CPT | Performed by: PHYSICIAN ASSISTANT

## 2022-04-25 PROCEDURE — U0005 INFEC AGEN DETEC AMPLI PROBE: HCPCS | Performed by: PHYSICIAN ASSISTANT

## 2022-04-25 RX ORDER — NYSTATIN 100000 [USP'U]/G
POWDER TOPICAL 2 TIMES DAILY
Qty: 60 G | Refills: 0 | Status: SHIPPED | OUTPATIENT
Start: 2022-04-25

## 2022-04-25 NOTE — PATIENT INSTRUCTIONS
Stop all NSAIDs (motrin, ibuprofen, naproxen, aleve, advil, naprosyn, aspirin)  for at least 5 days prior to surgery.    Tylenol is safe prior to surgery.    Stop all supplements now.    Take clonidine and lisinopril with a sip of water the am of surgery.    Do not take tizanidine the am of surgery.    Skip vitamins and supplements the am of surgery.    Take all other evening medications as usual the night before surgery.

## 2022-04-25 NOTE — PROGRESS NOTES
[address], [address]  [facility] [address]  Phone: [phone]  Primary Provider: Maribell Castaneda  Pre-op Performing Provider: CORNEL MEJIA      PREOPERATIVE EVALUATION:  Today's date: 4/25/2022    Carmen Bonner is a 76 year old female who presents for a preoperative evaluation.    Surgical Information:  Surgery/Procedure: Colonoscopy  Surgery Location: [facility]  Surgeon: Dr. Pearl  Surgery Date: 4/28/22  Time of Surgery: 9:30am  Where patient plans to recover: At home with family  Fax number for surgical facility: Note does not need to be faxed, will be available electronically in Epic.    Type of Anesthesia Anticipated: Local with MAC    Assessment & Plan     The proposed surgical procedure is considered LOW risk.    Assessment and Plan:     (Z01.818) Preop general physical exam  (primary encounter diagnosis)  Comment: able to do 4 METs  Plan: Basic metabolic panel  (Ca, Cl, CO2, Creat,         Gluc, K, Na, BUN), Asymptomatic COVID-19 Virus         (Coronavirus) by PCR Nose  Cleared for procedure     (R19.5) Positive FIT (fecal immunochemical test)  Comment: 4/2022  Plan: colonoscopy as planned     (E78.5) Hyperlipidemia, unspecified hyperlipidemia type  Comment: on crestor  Plan:     (G47.30) Sleep apnea, unspecified type  Comment: could not tolerated CPAP  Plan: please monitor 02 sats and cariopulm status closely in cielo-op period     (R21) Rash  Comment: left axilla, looks like yeast, mild  Plan: nystatin (MYCOSTATIN) [id number] UNIT/GM external         powder    (I10) Essential hypertension  Comment: on clonidine and lisinopril  Plan: take am of surgery to avoid hypertension    (J45.20) Intermittent asthma, uncomplicated  Comment: uses alb prn  Plan:     (Z86.79) History of paroxysmal supraventricular tachycardia  Comment: rare episodes on zio patch  Plan:     (N39.0) Recurrent urinary tract infection  Comment: follows with urology, asymptomatic  currently  Plan:       Risks and Recommendations:  The patient has the following additional risks and recommendations for perioperative complications:   - Consult Hospitalist / IM to assist with post-op medical management    Medication Instructions:  Stop all NSAIDs (motrin, ibuprofen, naproxen, aleve, advil, naprosyn, aspirin)  for at least 5 days prior to surgery.    Tylenol is safe prior to surgery.    Stop all supplements now.    Take clonidine and lisinopril with a sip of water the am of surgery.    Do not take tizanidine the am of surgery.    Skip vitamins and supplements the am of surgery.    Take all other evening medications as usual the night before surgery.    RECOMMENDATION:  APPROVAL GIVEN to proceed with proposed procedure, without further diagnostic evaluation.      Latoya Griggs PA-C  30 minutes on the day of the encounter doing chart review, history and exam, documentation and further activities as noted above.      Subjective     HPI related to upcoming procedure:  She had a positive FIT test and will have colonoscopy on 4/28/22.    She otherwise feels pretty well.  She walks almost daily in the hallways of her [employer] building, 5-7 minutes briskly.  She denies chest pain or significant dyspnea with walking.    She notes that she has had a rash in her left axilla x weeks.  It is not painful, started after shaving.    See ROS below   Preop Questions 4/21/2022   1. Have you ever had a heart attack or stroke? No   2. Have you ever had surgery on your heart or blood vessels, such as a stent placement, a coronary artery bypass, or surgery on an artery in your head, neck, heart, or legs? No   3. Do you have chest pain with activity? No   4. Do you have a history of  heart failure? No   5. Do you currently have a cold, bronchitis or symptoms of other infection? UNKNOWN -    6. Do you have a cough, shortness of breath, or wheezing? No   7. Do you or anyone in your family have previous history of  blood clots? No   8. Do you or does anyone in your family have a serious bleeding problem such as prolonged bleeding following surgeries or cuts? No   9. Have you ever had problems with anemia or been told to take iron pills? YES -    10. Have you had any abnormal blood loss such as black, tarry or bloody stools, or abnormal vaginal bleeding? No   11. Have you ever had a blood transfusion? No   12. Are you willing to have a blood transfusion if it is medically needed before, during, or after your surgery? Yes   13. Have you or any of your relatives ever had problems with anesthesia? No   14. Do you have sleep apnea, excessive snoring or daytime drowsiness? UNKNOWN - diagnosed with KAR[facility] in [place]   15. Do you have any artifical heart valves or other implanted medical devices like a pacemaker, defibrillator, or continuous glucose monitor? No   16. Do you have artificial joints? No   17. Are you allergic to latex? YES:        Health Care Directive:  Patient does not have a Health Care Directive or Living Will: Advance Directive received and scanned. Click on Code in the patient header to view.    Review of Systems  CONSTITUTIONAL: NEGATIVE for fever, chills, change in weight  INTEGUMENTARY/SKIN: NEGATIVE for worrisome rashes, moles or lesions  EYES: NEGATIVE for vision changes or irritation  ENT/MOUTH: NEGATIVE for ear, mouth and throat problems  RESP: NEGATIVE for significant cough or SOB  CV: NEGATIVE for chest pain, palpitations or peripheral edema  GI: NEGATIVE for nausea, abdominal pain, heartburn, or change in bowel habits  : NEGATIVE for frequency, dysuria, or hematuria  MUSCULOSKELETAL: NEGATIVE for significant arthralgias or myalgia  NEURO: NEGATIVE for weakness, dizziness or paresthesias  ENDOCRINE: NEGATIVE for temperature intolerance, skin/hair changes  HEME: NEGATIVE for bleeding problems    Patient Active Problem List    Diagnosis Date Noted     Recurrent urinary tract infection 01/28/2022     Priority:  Medium     Iron deficiency 04/27/2021     Priority: Medium     declined for work up        Osteopenia, unspecified location 02/04/2021     Priority: Medium     Benign essential hypertension 07/23/2020     Priority: Medium     Primary insomnia 07/23/2020     Priority: Medium     Vitamin D deficiency 11/05/2019     Priority: Medium     Joint stiffness 09/24/2019     Priority: Medium     Acute midline thoracic back pain 09/24/2019     Priority: Medium     Physical deconditioning 09/24/2019     Priority: Medium     Anemia, unspecified type 08/29/2019     Priority: Medium     Generalized anxiety disorder 03/19/2019     Priority: Medium     History of paroxysmal supraventricular tachycardia 01/21/2019     Priority: Medium     Hyperlipidemia, unspecified hyperlipidemia type 01/09/2017     Priority: Medium     Allergic rhinitis      Priority: Medium     Advanced directives, counseling/discussion 09/01/2015     Priority: Medium     Advance Care Planning 9/1/2015: ACP Review and Resources Provided:  Reviewed chart for advance care plan.  Carmen PEACOCK [place] has no plan or code status on file. Discussed available resources and provided with information. Confirmed code status reflects current choices pending further ACP discussions.  Confirmed/documented legally designated decision maker(s). Added by Divine Epperson           Stool incontinence 11/28/2014     Priority: Medium     Pain in thoracic spine 04/02/2014     Priority: Medium     Lumbar spondylosis      Priority: Medium     Do you wish to do the replacement in the background? yes         Upper back pain      Priority: Medium     Hypertension      Priority: Medium     Arthritis 01/28/2014     Priority: Medium     -Causing chronic pain, unable to take NSAIDs secondary to gastritis.  Takes tizanidine 1-2 times daily for pain.       GERD (gastroesophageal reflux disease) 01/27/2014     Priority: Medium     Worsened by NSAIDs       Insomnia due to other mental disorder  01/27/2014     Priority: Medium     BMI 31 01/22/2014     Priority: Medium     CARDIOVASCULAR SCREENING; LDL GOAL LESS THAN 130 01/22/2014     Priority: Medium     HTN, goal below 140/90 01/22/2014     Priority: Medium     Intermittent asthma, uncomplicated 01/27/2014     Priority: Low      Past Medical History:   Diagnosis Date     Allergic rhinitis      Anxiety     on seroquel     Asthma      Cervicalgia      Chronic pain      Diabetes (H)     now diet controlled     DJD (degenerative joint disease) of lumbar spine     CHRONIC HAS SEEN PAIN CLINIC- put on tizanidine      Endometriosis      Finger pain      Finger swelling      Flank pain      GERD (gastroesophageal reflux disease)      Hematuria      Herpes      Hypertension      Hyponatremia      Insomnia     on seroquel     Insomnia      Knee pain      Mumps      Purpura (H)     SAW DERM      Recurrent UTI      Skin cancer     basal cell      Stress incontinence      Upper back pain      Past Surgical History:   Procedure Laterality Date     CHOLECYSTECTOMY       CYSTOSCOPY       HYSTERECTOMY TOTAL ABDOMINAL       Current Outpatient Medications   Medication Sig Dispense Refill     acetaminophen (ACETAMINOPHEN EXTRA STRENGTH) 500 MG tablet Take 500-1,000 mg by mouth every 6 hours as needed for mild pain       albuterol (PROAIR HFA) 108 (90 Base) MCG/ACT inhaler Inhale 2 puffs into the lungs every 4 hours as needed for shortness of breath / dyspnea or wheezing 8.5 Inhaler 11     cholecalciferol 25 MCG (1000 UT) CHEW Take 1 chew tab by mouth daily       cloNIDine (CATAPRES) 0.2 MG tablet Take 1 tablet (0.2 mg) by mouth 2 times daily for Blood Pressure 180 tablet 1     conjugated estrogens (PREMARIN) 0.625 MG/GM vaginal cream Place 1 g vaginally twice a week 30 g 1     Cranberry 140-100-3 MG-MG-UNIT CAPS Take 1 capsule by mouth daily       estradiol (ESTRACE) 0.1 MG/GM vaginal cream Place 2 g vaginally twice a week 42.5 g 1     fluconazole (DIFLUCAN) 150 MG tablet  Take 1 tablet (150 mg) by mouth once as needed 1 tablet 1     lisinopril (ZESTRIL) 40 MG tablet Take 1 tablet (40 mg) by mouth daily for Blood Pressure 90 tablet 3     mometasone (NASONEX) 50 MCG/ACT nasal spray Spray 2 sprays into both nostrils daily 1 Box 3     montelukast (SINGULAIR) 10 MG tablet Take 1 tablet (10 mg) by mouth At Bedtime 90 tablet 3     nitroFURantoin macrocrystal-monohydrate (MACROBID) 100 MG capsule Take 1 capsule (100 mg) by mouth 2 times daily 6 capsule 0     omeprazole (PRILOSEC OTC) 20 MG EC tablet Take 1 tablet (20 mg) by mouth daily 90 tablet 3     QUEtiapine (SEROQUEL) 100 MG tablet Take 1.5 tablets (150 mg) by mouth At Bedtime for sleep 135 tablet 3     rosuvastatin (CRESTOR) 5 MG tablet Take 1 tablet (5 mg) by mouth daily for cholestrol 90 tablet 3     tiZANidine (ZANAFLEX) 2 MG tablet TAKE 1-2 TABLETS BY MOUTH DAILY AS NEEDED FOR MUSCLE SPASMS 180 tablet 1     triamcinolone (KENALOG) 0.1 % cream Apply topically 2 times daily 45 g 1     UNABLE TO FIND Take 2 capsules by mouth daily MEDICATION NAME: Energy & Fat Metabolism Factors       valACYclovir (VALTREX) 1000 mg tablet Take 1 tablet (1,000 mg) by mouth as needed Herpes Outbreak 12 tablet 1       Allergies   Allergen Reactions     Hctz [Hydrochlorothiazide]      Low sodiume     Lexapro [Escitalopram]      nausea     Omeprazole Diarrhea     Sulfa Drugs Itching     Venlafaxine Nausea     Amlodipine Swelling     swelling of foot        Social History     Tobacco Use     Smoking status: Former Smoker     Packs/day: 1.00     Years: 26.00     Pack years: 26.00     Quit date:      Years since quittin.3     Smokeless tobacco: Never Used   Substance Use Topics     Alcohol use: No     Alcohol/week: 0.0 standard drinks     Comment: Attends AA Meetings, sober since      Family History   Problem Relation Age of Onset     Cancer Mother      Osteoporosis Mother      Cerebrovascular Disease Father      Alzheimer Disease Father       "Genitourinary Problems Maternal Grandmother      Heart Disease Paternal Grandfather      Osteoporosis Sister      Arthritis Sister      Breast Cancer Paternal Grandmother      Colon Cancer No family hx of      History   Drug Use No         Objective     BP (!) 142/68   Pulse 66   Temp 98  F (36.7  C) (Oral)   Resp 20   Ht 1.575 m (5' 2\")   Wt 77.1 kg (170 lb)   SpO2 97%   BMI 31.09 kg/m        Physical Exam     Left axilla rash c/w mild yeast    GENERAL APPEARANCE: healthy, alert and no distress     EYES: no scleral icterus     HENT: OP clear mouth without ulcers or lesions     NECK: supple, no bruits     RESP: lungs clear to auscultation - no rales, rhonchi or wheezes     CV: regular rates and rhythm, normal S1 S2, no S3 or S4 and no murmur, click or rub     ABDOMEN:  soft, nontender, no HSM or masses and bowel sounds normal     MS: extremities normal- no gross deformities noted, no edema     SKIN:small rash left axilla, erythematous patch in fold of skin c/w yeast     NEURO: Normal mentation, gait and speechnormal     PSYCH: mentation appears normal. and affect normal/bright      Recent Labs   Lab Test 11/05/21  1535 06/25/21  1534   HGB 12.9 13.4    199    136   POTASSIUM 3.7 4.5   CR 0.75 0.76        Diagnostics:  CBC and BMP pending   No EKG required, no history of coronary heart disease, significant arrhythmia, peripheral arterial disease or other structural heart disease.    Revised Cardiac Risk Index (RCRI):  The patient has the following serious cardiovascular risks for perioperative complications:   - No serious cardiac risks = 0 points     RCRI Interpretation: 0 points: Class I (very low risk - 0.4% complication rate)           Signed Electronically by: Latoya Griggs PA-C  Copy of this evaluation report is provided to requesting physician.      "

## 2022-04-25 NOTE — H&P (VIEW-ONLY)
[address], [address]  [facility] [address]  Phone: [phone]  Primary Provider: Maribell Castaneda  Pre-op Performing Provider: CORNEL MEJIA      PREOPERATIVE EVALUATION:  Today's date: 4/25/2022    Carmen Bonner is a 76 year old female who presents for a preoperative evaluation.    Surgical Information:  Surgery/Procedure: Colonoscopy  Surgery Location: [facility]  Surgeon: Dr. Pearl  Surgery Date: 4/28/22  Time of Surgery: 9:30am  Where patient plans to recover: At home with family  Fax number for surgical facility: Note does not need to be faxed, will be available electronically in Epic.    Type of Anesthesia Anticipated: Local with MAC    Assessment & Plan     The proposed surgical procedure is considered LOW risk.    Assessment and Plan:     (Z01.818) Preop general physical exam  (primary encounter diagnosis)  Comment: able to do 4 METs  Plan: Basic metabolic panel  (Ca, Cl, CO2, Creat,         Gluc, K, Na, BUN), Asymptomatic COVID-19 Virus         (Coronavirus) by PCR Nose  Cleared for procedure     (R19.5) Positive FIT (fecal immunochemical test)  Comment: 4/2022  Plan: colonoscopy as planned     (E78.5) Hyperlipidemia, unspecified hyperlipidemia type  Comment: on crestor  Plan:     (G47.30) Sleep apnea, unspecified type  Comment: could not tolerated CPAP  Plan: please monitor 02 sats and cariopulm status closely in cielo-op period     (R21) Rash  Comment: left axilla, looks like yeast, mild  Plan: nystatin (MYCOSTATIN) 614158 UNIT/GM external         powder    (I10) Essential hypertension  Comment: on clonidine and lisinopril  Plan: take am of surgery to avoid hypertension    (J45.20) Intermittent asthma, uncomplicated  Comment: uses alb prn  Plan:     (Z86.79) History of paroxysmal supraventricular tachycardia  Comment: rare episodes on zio patch  Plan:     (N39.0) Recurrent urinary tract infection  Comment: follows with urology, asymptomatic  currently  Plan:       Risks and Recommendations:  The patient has the following additional risks and recommendations for perioperative complications:   - Consult Hospitalist / IM to assist with post-op medical management    Medication Instructions:  Stop all NSAIDs (motrin, ibuprofen, naproxen, aleve, advil, naprosyn, aspirin)  for at least 5 days prior to surgery.    Tylenol is safe prior to surgery.    Stop all supplements now.    Take clonidine and lisinopril with a sip of water the am of surgery.    Do not take tizanidine the am of surgery.    Skip vitamins and supplements the am of surgery.    Take all other evening medications as usual the night before surgery.    RECOMMENDATION:  APPROVAL GIVEN to proceed with proposed procedure, without further diagnostic evaluation.      Latoya Griggs PA-C  30 minutes on the day of the encounter doing chart review, history and exam, documentation and further activities as noted above.      Subjective     HPI related to upcoming procedure:  She had a positive FIT test and will have colonoscopy on 4/28/22.    She otherwise feels pretty well.  She walks almost daily in the hallways of her [employer] building, 5-7 minutes briskly.  She denies chest pain or significant dyspnea with walking.    She notes that she has had a rash in her left axilla x weeks.  It is not painful, started after shaving.    See ROS below   Preop Questions 4/21/2022   1. Have you ever had a heart attack or stroke? No   2. Have you ever had surgery on your heart or blood vessels, such as a stent placement, a coronary artery bypass, or surgery on an artery in your head, neck, heart, or legs? No   3. Do you have chest pain with activity? No   4. Do you have a history of  heart failure? No   5. Do you currently have a cold, bronchitis or symptoms of other infection? UNKNOWN -    6. Do you have a cough, shortness of breath, or wheezing? No   7. Do you or anyone in your family have previous history of  blood clots? No   8. Do you or does anyone in your family have a serious bleeding problem such as prolonged bleeding following surgeries or cuts? No   9. Have you ever had problems with anemia or been told to take iron pills? YES -    10. Have you had any abnormal blood loss such as black, tarry or bloody stools, or abnormal vaginal bleeding? No   11. Have you ever had a blood transfusion? No   12. Are you willing to have a blood transfusion if it is medically needed before, during, or after your surgery? Yes   13. Have you or any of your relatives ever had problems with anesthesia? No   14. Do you have sleep apnea, excessive snoring or daytime drowsiness? UNKNOWN - diagnosed with [facility] in [place]   15. Do you have any artifical heart valves or other implanted medical devices like a pacemaker, defibrillator, or continuous glucose monitor? No   16. Do you have artificial joints? No   17. Are you allergic to latex? YES:        Health Care Directive:  Patient does not have a Health Care Directive or Living Will: Advance Directive received and scanned. Click on Code in the patient header to view.    Review of Systems  CONSTITUTIONAL: NEGATIVE for fever, chills, change in weight  INTEGUMENTARY/SKIN: NEGATIVE for worrisome rashes, moles or lesions  EYES: NEGATIVE for vision changes or irritation  ENT/MOUTH: NEGATIVE for ear, mouth and throat problems  RESP: NEGATIVE for significant cough or SOB  CV: NEGATIVE for chest pain, palpitations or peripheral edema  GI: NEGATIVE for nausea, abdominal pain, heartburn, or change in bowel habits  : NEGATIVE for frequency, dysuria, or hematuria  MUSCULOSKELETAL: NEGATIVE for significant arthralgias or myalgia  NEURO: NEGATIVE for weakness, dizziness or paresthesias  ENDOCRINE: NEGATIVE for temperature intolerance, skin/hair changes  HEME: NEGATIVE for bleeding problems    Patient Active Problem List    Diagnosis Date Noted     Recurrent urinary tract infection 01/28/2022     Priority:  Medium     Iron deficiency 04/27/2021     Priority: Medium     declined for work up        Osteopenia, unspecified location 02/04/2021     Priority: Medium     Benign essential hypertension 07/23/2020     Priority: Medium     Primary insomnia 07/23/2020     Priority: Medium     Vitamin D deficiency 11/05/2019     Priority: Medium     Joint stiffness 09/24/2019     Priority: Medium     Acute midline thoracic back pain 09/24/2019     Priority: Medium     Physical deconditioning 09/24/2019     Priority: Medium     Anemia, unspecified type 08/29/2019     Priority: Medium     Generalized anxiety disorder 03/19/2019     Priority: Medium     History of paroxysmal supraventricular tachycardia 01/21/2019     Priority: Medium     Hyperlipidemia, unspecified hyperlipidemia type 01/09/2017     Priority: Medium     Allergic rhinitis      Priority: Medium     Advanced directives, counseling/discussion 09/01/2015     Priority: Medium     Advance Care Planning 9/1/2015: ACP Review and Resources Provided:  Reviewed chart for advance care plan.  Carmen [place] has no plan or code status on file. Discussed available resources and provided with information. Confirmed code status reflects current choices pending further ACP discussions.  Confirmed/documented legally designated decision maker(s). Added by Divine Epperson           Stool incontinence 11/28/2014     Priority: Medium     Pain in thoracic spine 04/02/2014     Priority: Medium     Lumbar spondylosis      Priority: Medium     Do you wish to do the replacement in the background? yes         Upper back pain      Priority: Medium     Hypertension      Priority: Medium     Arthritis 01/28/2014     Priority: Medium     -Causing chronic pain, unable to take NSAIDs secondary to gastritis.  Takes tizanidine 1-2 times daily for pain.       GERD (gastroesophageal reflux disease) 01/27/2014     Priority: Medium     Worsened by NSAIDs       Insomnia due to other mental disorder  01/27/2014     Priority: Medium     BMI 31 01/22/2014     Priority: Medium     CARDIOVASCULAR SCREENING; LDL GOAL LESS THAN 130 01/22/2014     Priority: Medium     HTN, goal below 140/90 01/22/2014     Priority: Medium     Intermittent asthma, uncomplicated 01/27/2014     Priority: Low      Past Medical History:   Diagnosis Date     Allergic rhinitis      Anxiety     on seroquel     Asthma      Cervicalgia      Chronic pain      Diabetes (H)     now diet controlled     DJD (degenerative joint disease) of lumbar spine     CHRONIC HAS SEEN PAIN CLINIC- put on tizanidine      Endometriosis      Finger pain      Finger swelling      Flank pain      GERD (gastroesophageal reflux disease)      Hematuria      Herpes      Hypertension      Hyponatremia      Insomnia     on seroquel     Insomnia      Knee pain      Mumps      Purpura (H)     SAW DERM      Recurrent UTI      Skin cancer     basal cell      Stress incontinence      Upper back pain      Past Surgical History:   Procedure Laterality Date     CHOLECYSTECTOMY       CYSTOSCOPY       HYSTERECTOMY TOTAL ABDOMINAL       Current Outpatient Medications   Medication Sig Dispense Refill     acetaminophen (ACETAMINOPHEN EXTRA STRENGTH) 500 MG tablet Take 500-1,000 mg by mouth every 6 hours as needed for mild pain       albuterol (PROAIR HFA) 108 (90 Base) MCG/ACT inhaler Inhale 2 puffs into the lungs every 4 hours as needed for shortness of breath / dyspnea or wheezing 8.5 Inhaler 11     cholecalciferol 25 MCG (1000 UT) CHEW Take 1 chew tab by mouth daily       cloNIDine (CATAPRES) 0.2 MG tablet Take 1 tablet (0.2 mg) by mouth 2 times daily for Blood Pressure 180 tablet 1     conjugated estrogens (PREMARIN) 0.625 MG/GM vaginal cream Place 1 g vaginally twice a week 30 g 1     Cranberry 140-100-3 MG-MG-UNIT CAPS Take 1 capsule by mouth daily       estradiol (ESTRACE) 0.1 MG/GM vaginal cream Place 2 g vaginally twice a week 42.5 g 1     fluconazole (DIFLUCAN) 150 MG tablet  Take 1 tablet (150 mg) by mouth once as needed 1 tablet 1     lisinopril (ZESTRIL) 40 MG tablet Take 1 tablet (40 mg) by mouth daily for Blood Pressure 90 tablet 3     mometasone (NASONEX) 50 MCG/ACT nasal spray Spray 2 sprays into both nostrils daily 1 Box 3     montelukast (SINGULAIR) 10 MG tablet Take 1 tablet (10 mg) by mouth At Bedtime 90 tablet 3     nitroFURantoin macrocrystal-monohydrate (MACROBID) 100 MG capsule Take 1 capsule (100 mg) by mouth 2 times daily 6 capsule 0     omeprazole (PRILOSEC OTC) 20 MG EC tablet Take 1 tablet (20 mg) by mouth daily 90 tablet 3     QUEtiapine (SEROQUEL) 100 MG tablet Take 1.5 tablets (150 mg) by mouth At Bedtime for sleep 135 tablet 3     rosuvastatin (CRESTOR) 5 MG tablet Take 1 tablet (5 mg) by mouth daily for cholestrol 90 tablet 3     tiZANidine (ZANAFLEX) 2 MG tablet TAKE 1-2 TABLETS BY MOUTH DAILY AS NEEDED FOR MUSCLE SPASMS 180 tablet 1     triamcinolone (KENALOG) 0.1 % cream Apply topically 2 times daily 45 g 1     UNABLE TO FIND Take 2 capsules by mouth daily MEDICATION NAME: Energy & Fat Metabolism Factors       valACYclovir (VALTREX) 1000 mg tablet Take 1 tablet (1,000 mg) by mouth as needed Herpes Outbreak 12 tablet 1       Allergies   Allergen Reactions     Hctz [Hydrochlorothiazide]      Low sodiume     Lexapro [Escitalopram]      nausea     Omeprazole Diarrhea     Sulfa Drugs Itching     Venlafaxine Nausea     Amlodipine Swelling     swelling of foot        Social History     Tobacco Use     Smoking status: Former Smoker     Packs/day: 1.00     Years: 26.00     Pack years: 26.00     Quit date:      Years since quittin.3     Smokeless tobacco: Never Used   Substance Use Topics     Alcohol use: No     Alcohol/week: 0.0 standard drinks     Comment: Attends AA Meetings, sober since      Family History   Problem Relation Age of Onset     Cancer Mother      Osteoporosis Mother      Cerebrovascular Disease Father      Alzheimer Disease Father       "Genitourinary Problems Maternal Grandmother      Heart Disease Paternal Grandfather      Osteoporosis Sister      Arthritis Sister      Breast Cancer Paternal Grandmother      Colon Cancer No family hx of      History   Drug Use No         Objective     BP (!) 142/68   Pulse 66   Temp 98  F (36.7  C) (Oral)   Resp 20   Ht 1.575 m (5' 2\")   Wt 77.1 kg (170 lb)   SpO2 97%   BMI 31.09 kg/m        Physical Exam     Left axilla rash c/w mild yeast    GENERAL APPEARANCE: healthy, alert and no distress     EYES: no scleral icterus     HENT: OP clear mouth without ulcers or lesions     NECK: supple, no bruits     RESP: lungs clear to auscultation - no rales, rhonchi or wheezes     CV: regular rates and rhythm, normal S1 S2, no S3 or S4 and no murmur, click or rub     ABDOMEN:  soft, nontender, no HSM or masses and bowel sounds normal     MS: extremities normal- no gross deformities noted, no edema     SKIN:small rash left axilla, erythematous patch in fold of skin c/w yeast     NEURO: Normal mentation, gait and speechnormal     PSYCH: mentation appears normal. and affect normal/bright      Recent Labs   Lab Test 11/05/21  1535 06/25/21  1534   HGB 12.9 13.4    199    136   POTASSIUM 3.7 4.5   CR 0.75 0.76        Diagnostics:  CBC and BMP pending   No EKG required, no history of coronary heart disease, significant arrhythmia, peripheral arterial disease or other structural heart disease.    Revised Cardiac Risk Index (RCRI):  The patient has the following serious cardiovascular risks for perioperative complications:   - No serious cardiac risks = 0 points     RCRI Interpretation: 0 points: Class I (very low risk - 0.4% complication rate)           Signed Electronically by: Latoya Griggs PA-C  Copy of this evaluation report is provided to requesting physician.      "

## 2022-04-26 LAB
ANION GAP SERPL CALCULATED.3IONS-SCNC: 2 MMOL/L (ref 3–14)
BUN SERPL-MCNC: 16 MG/DL (ref 7–30)
CALCIUM SERPL-MCNC: 9 MG/DL (ref 8.5–10.1)
CHLORIDE BLD-SCNC: 103 MMOL/L (ref 94–109)
CO2 SERPL-SCNC: 30 MMOL/L (ref 20–32)
CREAT SERPL-MCNC: 0.76 MG/DL (ref 0.52–1.04)
GFR SERPL CREATININE-BSD FRML MDRD: 80 ML/MIN/1.73M2
GLUCOSE BLD-MCNC: 100 MG/DL (ref 70–99)
POTASSIUM BLD-SCNC: 4.1 MMOL/L (ref 3.4–5.3)
SODIUM SERPL-SCNC: 135 MMOL/L (ref 133–144)

## 2022-04-26 NOTE — RESULT ENCOUNTER NOTE
Dear Carmen,     Here are your recent basic metabolic panel results which are normal.  Your covid test was negative.    Please let us know if you have any questions or concerns.    Regards,  Latoya Griggs PA-C

## 2022-04-28 ENCOUNTER — ANESTHESIA (OUTPATIENT)
Dept: GASTROENTEROLOGY | Facility: CLINIC | Age: 77
End: 2022-04-28
Payer: MEDICARE

## 2022-04-28 ENCOUNTER — ANESTHESIA EVENT (OUTPATIENT)
Dept: GASTROENTEROLOGY | Facility: CLINIC | Age: 77
End: 2022-04-28
Payer: MEDICARE

## 2022-04-28 ENCOUNTER — HOSPITAL ENCOUNTER (OUTPATIENT)
Facility: CLINIC | Age: 77
Discharge: HOME | End: 2022-04-28
Attending: INTERNAL MEDICINE | Admitting: INTERNAL MEDICINE
Payer: MEDICARE

## 2022-04-28 VITALS
OXYGEN SATURATION: 97 % | WEIGHT: 170 LBS | SYSTOLIC BLOOD PRESSURE: 193 MMHG | HEIGHT: 62 IN | HEART RATE: 61 BPM | RESPIRATION RATE: 17 BRPM | BODY MASS INDEX: 31.28 KG/M2 | DIASTOLIC BLOOD PRESSURE: 106 MMHG

## 2022-04-28 DIAGNOSIS — Z12.11 ENCOUNTER FOR SCREENING COLONOSCOPY: Primary | ICD-10-CM

## 2022-04-28 LAB
COLONOSCOPY: NORMAL
UPPER GI ENDOSCOPY: NORMAL

## 2022-04-28 PROCEDURE — 258N000003 HC RX IP 258 OP 636: Performed by: NURSE ANESTHETIST, CERTIFIED REGISTERED

## 2022-04-28 PROCEDURE — 250N000011 HC RX IP 250 OP 636: Performed by: NURSE ANESTHETIST, CERTIFIED REGISTERED

## 2022-04-28 PROCEDURE — 45378 DIAGNOSTIC COLONOSCOPY: CPT | Performed by: INTERNAL MEDICINE

## 2022-04-28 PROCEDURE — 370N000017 HC ANESTHESIA TECHNICAL FEE, PER MIN: Performed by: INTERNAL MEDICINE

## 2022-04-28 PROCEDURE — 43239 EGD BIOPSY SINGLE/MULTIPLE: CPT | Performed by: INTERNAL MEDICINE

## 2022-04-28 PROCEDURE — 250N000009 HC RX 250: Performed by: NURSE ANESTHETIST, CERTIFIED REGISTERED

## 2022-04-28 PROCEDURE — 88305 TISSUE EXAM BY PATHOLOGIST: CPT | Mod: TC | Performed by: INTERNAL MEDICINE

## 2022-04-28 PROCEDURE — 999N000010 HC STATISTIC ANES STAT CODE-CRNA PER MINUTE: Performed by: INTERNAL MEDICINE

## 2022-04-28 RX ORDER — LIDOCAINE HYDROCHLORIDE 20 MG/ML
SOLUTION OROPHARYNGEAL PRN
Status: DISCONTINUED | OUTPATIENT
Start: 2022-04-28 | End: 2022-04-28

## 2022-04-28 RX ORDER — SODIUM CHLORIDE, SODIUM LACTATE, POTASSIUM CHLORIDE, CALCIUM CHLORIDE 600; 310; 30; 20 MG/100ML; MG/100ML; MG/100ML; MG/100ML
INJECTION, SOLUTION INTRAVENOUS CONTINUOUS PRN
Status: DISCONTINUED | OUTPATIENT
Start: 2022-04-28 | End: 2022-04-28

## 2022-04-28 RX ORDER — ONDANSETRON 2 MG/ML
INJECTION INTRAMUSCULAR; INTRAVENOUS PRN
Status: DISCONTINUED | OUTPATIENT
Start: 2022-04-28 | End: 2022-04-28

## 2022-04-28 RX ORDER — LIDOCAINE HYDROCHLORIDE 20 MG/ML
INJECTION, SOLUTION INFILTRATION; PERINEURAL PRN
Status: DISCONTINUED | OUTPATIENT
Start: 2022-04-28 | End: 2022-04-28

## 2022-04-28 RX ORDER — PROPOFOL 10 MG/ML
INJECTION, EMULSION INTRAVENOUS CONTINUOUS PRN
Status: DISCONTINUED | OUTPATIENT
Start: 2022-04-28 | End: 2022-04-28

## 2022-04-28 RX ADMIN — PROPOFOL 150 MCG/KG/MIN: 10 INJECTION, EMULSION INTRAVENOUS at 10:15

## 2022-04-28 RX ADMIN — LIDOCAINE HYDROCHLORIDE 60 MG: 20 INJECTION, SOLUTION INFILTRATION; PERINEURAL at 10:15

## 2022-04-28 RX ADMIN — SODIUM CHLORIDE, POTASSIUM CHLORIDE, SODIUM LACTATE AND CALCIUM CHLORIDE: 600; 310; 30; 20 INJECTION, SOLUTION INTRAVENOUS at 10:15

## 2022-04-28 RX ADMIN — LIDOCAINE HYDROCHLORIDE 5 ML: 20 SOLUTION ORAL; TOPICAL at 10:15

## 2022-04-28 RX ADMIN — ONDANSETRON 4 MG: 2 INJECTION INTRAMUSCULAR; INTRAVENOUS at 10:15

## 2022-04-28 NOTE — ANESTHESIA PREPROCEDURE EVALUATION
Anesthesia Pre-Procedure Evaluation    Patient: Carmen Bonner   MRN: [id number] : 1945        Procedure : Procedure(s):  COLONOSCOPY  Esophagoscopy, gastroscopy, duodenoscopy (EGD), combined          Past Medical History:   Diagnosis Date     Allergic rhinitis      Anxiety     on seroquel     Asthma      Cervicalgia      Chronic pain      Diabetes (H)     now diet controlled     DJD (degenerative joint disease) of lumbar spine     CHRONIC HAS SEEN PAIN CLINIC- put on tizanidine      Endometriosis      Finger pain      Finger swelling      Flank pain      GERD (gastroesophageal reflux disease)      Hematuria      Herpes      Hypertension      Hyponatremia      Insomnia     on seroquel     Insomnia      Knee pain      Mumps      Purpura (H)     SAW DERM      Recurrent UTI      Skin cancer     basal cell      Stress incontinence      Upper back pain       Past Surgical History:   Procedure Laterality Date     CHOLECYSTECTOMY       CYSTOSCOPY       HYSTERECTOMY TOTAL ABDOMINAL        Allergies   Allergen Reactions     Hctz [Hydrochlorothiazide]      Low sodiume     Lexapro [Escitalopram]      nausea     Omeprazole Diarrhea     Sulfa Drugs Itching     Venlafaxine Nausea     Amlodipine Swelling     swelling of foot      Social History     Tobacco Use     Smoking status: Former Smoker     Packs/day: 1.00     Years: 26.00     Pack years: 26.00     Quit date:      Years since quittin.3     Smokeless tobacco: Never Used   Substance Use Topics     Alcohol use: No     Alcohol/week: 0.0 standard drinks     Comment: Attends AA Meetings, sober since       Wt Readings from Last 1 Encounters:   22 77.1 kg (170 lb)        Anesthesia Evaluation            ROS/MED HX  ENT/Pulmonary:     (+) sleep apnea, doesn't use CPAP, allergic rhinitis, asthma     Neurologic:       Cardiovascular: Comment: Hx PSVT;    (+) Dyslipidemia hypertension-----    METS/Exercise Tolerance:     Hematologic:       Musculoskeletal:    (+) arthritis,     GI/Hepatic:     (+) GERD,     Renal/Genitourinary:       Endo:     (+) Obesity,     Psychiatric/Substance Use:     (+) psychiatric history anxiety     Infectious Disease:       Malignancy:       Other:            Physical Exam    Airway        Mallampati: III   TM distance: > 3 FB   Neck ROM: full   Mouth opening: > 3 cm    Respiratory Devices and Support         Dental       (+) missing and partials      Cardiovascular   cardiovascular exam normal          Pulmonary   pulmonary exam normal                OUTSIDE LABS:  CBC:   Lab Results   Component Value Date    WBC 5.9 11/05/2021    WBC 5.8 06/25/2021    HGB 12.9 11/05/2021    HGB 13.4 06/25/2021    HCT 38.8 11/05/2021    HCT 40.9 06/25/2021     11/05/2021     06/25/2021     BMP:   Lab Results   Component Value Date     04/25/2022     11/05/2021    POTASSIUM 4.1 04/25/2022    POTASSIUM 3.7 11/05/2021    CHLORIDE 103 04/25/2022    CHLORIDE 101 11/05/2021    CO2 30 04/25/2022    CO2 26 11/05/2021    BUN 16 04/25/2022    BUN 15 11/05/2021    CR 0.76 04/25/2022    CR 0.75 11/05/2021     (H) 04/25/2022     (H) 11/05/2021     COAGS: No results found for: PTT, INR, FIBR  POC: No results found for: BGM, HCG, HCGS  HEPATIC:   Lab Results   Component Value Date    ALBUMIN 4.0 07/29/2020    PROTTOTAL 8.2 07/29/2020    ALT 27 04/22/2021    AST 15 07/29/2020    ALKPHOS 67 07/29/2020    BILITOTAL 0.4 07/29/2020     OTHER:   Lab Results   Component Value Date    A1C 5.4 01/28/2013    FABY 9.0 04/25/2022    MAG 2.3 10/31/2018    TSH 1.38 02/04/2021    CRP <2.9 08/29/2019       Anesthesia Plan    ASA Status:  2   NPO Status:  NPO Appropriate    Anesthesia Type: MAC.              Consents    Anesthesia Plan(s) and associated risks, benefits, and realistic alternatives discussed. Questions answered and patient/representative(s) expressed understanding.    - Discussed:     - Discussed with:  Patient         Postoperative  Care       PONV prophylaxis: Ondansetron (or other 5HT-3)     Comments:                LAMONTE GRACE MD

## 2022-04-28 NOTE — ANESTHESIA POSTPROCEDURE EVALUATION
Patient: Carmen Bonner    Procedure: Procedure(s):  COLONOSCOPY  Esophagoscopy, gastroscopy, duodenoscopy (EGD), combined       Anesthesia Type:  MAC    Note:  Disposition: Outpatient   Postop Pain Control: Uneventful            Sign Out: Well controlled pain   PONV: No   Neuro/Psych: Uneventful            Sign Out: Acceptable/Baseline neuro status   Airway/Respiratory: Uneventful            Sign Out: Acceptable/Baseline resp. status   CV/Hemodynamics: Uneventful            Sign Out: Acceptable CV status; No obvious hypovolemia; No obvious fluid overload   Other NRE: NONE   DID A NON-ROUTINE EVENT OCCUR? No           Last vitals:  Vitals Value Taken Time   /106 04/28/22 1110   Temp     Pulse 64 04/28/22 1103   Resp 30 04/28/22 1104   SpO2 97 % 04/28/22 1103   Vitals shown include unvalidated device data.    Electronically Signed By: LAMONTE GRACE MD  April 28, 2022  11:56 AM

## 2022-04-28 NOTE — ANESTHESIA CARE TRANSFER NOTE
Patient: Carmen Bonner    Procedure: Procedure(s):  COLONOSCOPY  Esophagoscopy, gastroscopy, duodenoscopy (EGD), combined       Diagnosis: Positive FIT (fecal immunochemical test) [R19.5]  Diagnosis Additional Information: No value filed.    Anesthesia Type:   MAC     Note:    Oropharynx: oropharynx clear of all foreign objects and spontaneously breathing  Level of Consciousness: drowsy  Oxygen Supplementation: nasal cannula  Level of Supplemental Oxygen (L/min / FiO2): 3  Independent Airway: airway patency satisfactory and stable  Dentition: dentition unchanged  Vital Signs Stable: post-procedure vital signs reviewed and stable  Report to RN Given: handoff report given  Patient transferred to: PACU  Comments: At end of procedure, spontaneous respirations, patient alert to voice, able to follow commands. Oxygen via nasal cannula at 3 liters per minute to Endoscopy Recovery. Oxygen tubing connected to wall O2 in PACU, SpO2, NiBP, and EKG monitors and alarms on and functioning, report on patient's clinical status given to Endoscopy RN, RN questions answered.  Handoff Report: Identifed the Patient, Identified the Reponsible Provider, Reviewed the pertinent medical history, Discussed the surgical course, Reviewed Intra-OP anesthesia mangement and issues during anesthesia, Set expectations for post-procedure period and Allowed opportunity for questions and acknowledgement of understanding      Vitals:  Vitals Value Taken Time   BP     Temp     Pulse     Resp     SpO2         Electronically Signed By: BARNEY Burns CRNA  April 28, 2022  10:37 AM

## 2022-04-28 NOTE — INTERVAL H&P NOTE
"I have reviewed the surgical (or preoperative) H&P that is linked to this encounter, and examined the patient. There are no significant changes    Clinical Conditions Present on Arrival:  Clinically Significant Risk Factors Present on Admission                   # Obesity: Estimated body mass index is 31.09 kg/m  as calculated from the following:    Height as of 4/25/22: 1.575 m (5' 2\").    Weight as of 4/25/22: 77.1 kg (170 lb).       "

## 2022-04-29 LAB
PATH REPORT.COMMENTS IMP SPEC: NORMAL
PATH REPORT.COMMENTS IMP SPEC: NORMAL
PATH REPORT.FINAL DX SPEC: NORMAL
PATH REPORT.GROSS SPEC: NORMAL
PATH REPORT.MICROSCOPIC SPEC OTHER STN: NORMAL
PATH REPORT.RELEVANT HX SPEC: NORMAL
PHOTO IMAGE: NORMAL

## 2022-04-29 PROCEDURE — 88305 TISSUE EXAM BY PATHOLOGIST: CPT | Mod: 26 | Performed by: PATHOLOGY

## 2022-05-03 ENCOUNTER — TRANSFERRED RECORDS (OUTPATIENT)
Dept: HEALTH INFORMATION MANAGEMENT | Facility: CLINIC | Age: 77
End: 2022-05-03

## 2022-05-03 ENCOUNTER — LAB REQUISITION (OUTPATIENT)
Dept: LAB | Facility: CLINIC | Age: 77
End: 2022-05-03
Payer: MEDICARE

## 2022-05-03 DIAGNOSIS — J32.9 CHRONIC SINUSITIS, UNSPECIFIED: ICD-10-CM

## 2022-05-03 PROCEDURE — 87077 CULTURE AEROBIC IDENTIFY: CPT | Mod: ORL,59 | Performed by: OTOLARYNGOLOGY

## 2022-05-07 LAB
BACTERIA SPEC CULT: ABNORMAL
BACTERIA SPEC CULT: ABNORMAL

## 2022-05-27 ENCOUNTER — TRANSFERRED RECORDS (OUTPATIENT)
Dept: HEALTH INFORMATION MANAGEMENT | Facility: CLINIC | Age: 77
End: 2022-05-27
Payer: MEDICARE

## 2022-06-06 ENCOUNTER — TRANSFERRED RECORDS (OUTPATIENT)
Dept: HEALTH INFORMATION MANAGEMENT | Facility: CLINIC | Age: 77
End: 2022-06-06
Payer: MEDICARE

## 2022-06-07 ENCOUNTER — IMMUNIZATION (OUTPATIENT)
Dept: NURSING | Facility: CLINIC | Age: 77
End: 2022-06-07
Payer: MEDICARE

## 2022-06-07 PROCEDURE — 91305 COVID-19,PF,PFIZER (12+ YRS): CPT

## 2022-06-07 PROCEDURE — 0054A COVID-19,PF,PFIZER (12+ YRS): CPT

## 2022-06-08 ENCOUNTER — VIRTUAL VISIT (OUTPATIENT)
Dept: UROLOGY | Facility: CLINIC | Age: 77
End: 2022-06-08
Payer: MEDICARE

## 2022-06-08 VITALS — HEIGHT: 62 IN | WEIGHT: 163 LBS | BODY MASS INDEX: 30 KG/M2

## 2022-06-08 DIAGNOSIS — R30.0 DYSURIA: Primary | ICD-10-CM

## 2022-06-08 PROCEDURE — 99443 PR PHYSICIAN TELEPHONE EVALUATION 21-30 MIN: CPT | Performed by: PHYSICIAN ASSISTANT

## 2022-06-08 ASSESSMENT — PAIN SCALES - GENERAL: PAINLEVEL: NO PAIN (0)

## 2022-06-08 NOTE — LETTER
"6/8/2022       RE: Carmen Bonner  [address]  [facility] [address]     Dear Colleague,    Thank you for referring your patient, Carmen Bonner, to the [facility] at [facility]. Please see a copy of my visit note below.    PT HAS SOME STOMACH ISSUES      Carmen is a 76 year old who is being evaluated via a billable telephone visit.      What phone number would you like to be contacted at?   [phone]  How would you like to obtain your AVS? [employer]  Phone call duration: 6 minutes    CC: Painful urination     HPI:  Carmen Bonner is a pleasant 76 year old female who presents in follow-up of the above.  Has had several [facility] and [facility] visits with no cultures avail. She thinks there may have been some e coli cultures. Lived in [place] and [place] recently and had similar issues there. Saw a urologist and had a cysto (hx micro hematuria). \"No reason for the blood in the urine\".     No gross hematuria, pelvic or abdominal pain, hx of stones. The one urine culture avail is negative. Started on Premarin 1 mo ago and BID and cran tabs. Feeling overall better from urinary standpoint. Having more \"stomach issues\". Wears a light pad at times for security.      Past Medical History        Past Medical History:   Diagnosis Date     Allergic rhinitis       Anxiety       on seroquel     Asthma       Cervicalgia       Chronic pain       Diabetes (H)       now diet controlled     DJD (degenerative joint disease) of lumbar spine       CHRONIC HAS SEEN PAIN CLINIC- put on tizanidine      Endometriosis       Finger pain       Finger swelling       Flank pain       GERD (gastroesophageal reflux disease)       Hematuria       Herpes       Hypertension       Hyponatremia       Insomnia       on seroquel     Insomnia       Knee pain       Mumps       Purpura (H)       SAW DERM      Recurrent UTI       Skin cancer       basal cell      Stress incontinence   "     Upper back pain              Past Surgical History         Past Surgical History:   Procedure Laterality Date     CHOLECYSTECTOMY         CYSTOSCOPY         HYSTERECTOMY TOTAL ABDOMINAL                Social History   Social History            Socioeconomic History     Marital status: Single       Spouse name: Not on file     Number of children: Not on file     Years of education: Not on file     Highest education level: Not on file   Occupational History     Not on file   Tobacco Use     Smoking status: Former Smoker       Packs/day: 1.00       Years: 26.00       Pack years: 26.00       Quit date:        Years since quittin.1     Smokeless tobacco: Never Used   Substance and Sexual Activity     Alcohol use: No       Alcohol/week: 0.0 standard drinks       Comment: Attends AA Meetings, sober since      Drug use: No     Sexual activity: Never   Other Topics Concern     Parent/sibling w/ CABG, MI or angioplasty before 65F 55M? No   Social History Narrative     Health maintenance                  ADVANCED DIRECTIVE PAMPHLET      COLONOSCOPY      DEXA      ANNUAL WELLNESS VISIT     ASTHMA CONTROL TEST/ ACTION PLAN                  LOOSE STOOLS                   Duration: frequent looser stools / gurgling noises                    No improvement with the fodmaps diet                    Better off nexium/                     Now on prevacid           Associated flank pain: None                  Intensity:  moderate                  Accompanying signs and symptoms:          Fever/Chills: no          Gas/Bloating: YES          Nausea/vomitting: no          Diarrhea: YES          Dysuria or Hematuria: no                  Now regulating it with diet                  Precipitating or alleviating factors:          Pain worse with eating/BM/urination: yes                         Therapies tried and outcome: fodmap diet                    LMP:  not applicable                                    Back Pain                          Duration:                  HAS CHRONIC LOW BACK PAIN HAS BEEN TO PAIN CLINIC  WAS PUT ON TIZANIDINE  DOES REGULAR BACK EXERCISES  Takes up to 2  A day of tizanidine with tylenol MRI - showed mild to moderate degenerative changes                    NEW PAIN UPPER BACK  Had it a few time over the last 2-3 years                    3 months, worsening with yoga          Specific cause: none                  Description (location/character/radiation): mid back                    Intensity:  moderate, severe SHARP PAIN  SAME ON BOTH SIDE                    Accompanying signs and symptoms (weakness/fever/urinary symptoms): none                  History (similar episodes/surgery/injury):                    Precipitating or alleviating factors: YOGA                    Therapies tried and outcome: exercise, tylenol        Social Determinants of Health      Financial Resource Strain: Not on file   Food Insecurity: Not on file   Transportation Needs: Not on file   Physical Activity: Not on file   Stress: Not on file   Social Connections: Not on file   Intimate Partner Violence: Not on file   Housing Stability: Not on file            Family History         Family History   Problem Relation Age of Onset     Cancer Mother       Osteoporosis Mother       Cerebrovascular Disease Father       Alzheimer Disease Father       Genitourinary Problems Maternal Grandmother       Heart Disease Paternal Grandfather       Osteoporosis Sister       Arthritis Sister       Breast Cancer Paternal Grandmother       Colon Cancer No family hx of              ROS:14 point ROS neg other than the symptoms noted above in the HPI.           Allergies   Allergen Reactions     Hctz [Hydrochlorothiazide]         Low sodiume     Lexapro [Escitalopram]         nausea     Omeprazole Diarrhea     Sulfa Drugs Itching     Venlafaxine Nausea     Amlodipine Swelling       swelling of foot             Current Outpatient Medications   Medication      acetaminophen (ACETAMINOPHEN EXTRA STRENGTH) 500 MG tablet     albuterol (PROAIR HFA) 108 (90 Base) MCG/ACT inhaler     cholecalciferol 25 MCG (1000 UT) CHEW     cloNIDine (CATAPRES) 0.2 MG tablet     conjugated estrogens (PREMARIN) 0.625 MG/GM vaginal cream     Cranberry 140-100-3 MG-MG-UNIT CAPS     estradiol (ESTRACE) 0.1 MG/GM vaginal cream     fluconazole (DIFLUCAN) 150 MG tablet     lisinopril (ZESTRIL) 40 MG tablet     mometasone (NASONEX) 50 MCG/ACT nasal spray     montelukast (SINGULAIR) 10 MG tablet     omeprazole (PRILOSEC OTC) 20 MG EC tablet     QUEtiapine (SEROQUEL) 100 MG tablet     rosuvastatin (CRESTOR) 5 MG tablet     tiZANidine (ZANAFLEX) 2 MG tablet     triamcinolone (KENALOG) 0.1 % cream     UNABLE TO FIND     valACYclovir (VALTREX) 1000 mg tablet      No current facility-administered medications for this visit.            PEx:     GEN: NAD       Urine: negative       A/P: Carmen Bonner is a 76 year old female with dysuria, poss rUTIs.  - U/A, UC when symptoms  -Continue estrogen cream, this will treat atrophic vaginitis and urethral changes which may impact her pelvic discomfort and improve her urinary urgency. Added bonus of UTI prevention. Estrace vs cmpd with refill to lessen her cost.   - Hydrate  - Lifestyle and hygiene modifications were reviewed today in clinic, including wiping front to back, wearing cotton breathable underwear, voiding before and after intercourse to flush the urethra, minimizing baths and opting for showers, and appropriate perineal hygiene.   -12 mo recheck or with PCP.     Sarah Herr PA-C  [facility]          28 minutes spent on the date of the encounter doing chart review, review of outside records, review of test results, interpretation of tests, patient visit and documentation

## 2022-06-08 NOTE — PROGRESS NOTES
"PT HAS SOME STOMACH ISSUES      Carmen is a 76 year old who is being evaluated via a billable telephone visit.      What phone number would you like to be contacted at?   [phone]  How would you like to obtain your AVS? [employer]  Phone call duration: 6 minutes    CC: Painful urination     HPI:  Carmen Bonner is a pleasant 76 year old female who presents in follow-up of the above.  Has had several [facility] and [facility] visits with no cultures avail. She thinks there may have been some e coli cultures. Lived in [place] and [place] recently and had similar issues there. Saw a urologist and had a cysto (hx micro hematuria). \"No reason for the blood in the urine\".     No gross hematuria, pelvic or abdominal pain, hx of stones. The one urine culture avail is negative. Started on Premarin 1 mo ago and BID and cran tabs. Feeling overall better from urinary standpoint. Having more \"stomach issues\". Wears a light pad at times for security.      Past Medical History        Past Medical History:   Diagnosis Date     Allergic rhinitis       Anxiety       on seroquel     Asthma       Cervicalgia       Chronic pain       Diabetes (H)       now diet controlled     DJD (degenerative joint disease) of lumbar spine       CHRONIC HAS SEEN PAIN CLINIC- put on tizanidine      Endometriosis       Finger pain       Finger swelling       Flank pain       GERD (gastroesophageal reflux disease)       Hematuria       Herpes       Hypertension       Hyponatremia       Insomnia       on seroquel     Insomnia       Knee pain       Mumps       Purpura (H)       SAW DERM      Recurrent UTI       Skin cancer       basal cell      Stress incontinence       Upper back pain              Past Surgical History         Past Surgical History:   Procedure Laterality Date     CHOLECYSTECTOMY         CYSTOSCOPY         HYSTERECTOMY TOTAL ABDOMINAL                Social History   Social History            Socioeconomic History     Marital status: Single       Spouse " name: Not on file     Number of children: Not on file     Years of education: Not on file     Highest education level: Not on file   Occupational History     Not on file   Tobacco Use     Smoking status: Former Smoker       Packs/day: 1.00       Years: 26.00       Pack years: 26.00       Quit date:        Years since quittin.1     Smokeless tobacco: Never Used   Substance and Sexual Activity     Alcohol use: No       Alcohol/week: 0.0 standard drinks       Comment: Attends AA Meetings, sober since      Drug use: No     Sexual activity: Never   Other Topics Concern     Parent/sibling w/ CABG, MI or angioplasty before 65F 55M? No   Social History Narrative     Health maintenance                  ADVANCED DIRECTIVE PAMPHLET      COLONOSCOPY      DEXA      ANNUAL WELLNESS VISIT     ASTHMA CONTROL TEST/ ACTION PLAN                  LOOSE STOOLS                   Duration: frequent looser stools / gurgling noises                    No improvement with the fodmaps diet                    Better off nexium/                     Now on prevacid           Associated flank pain: None                  Intensity:  moderate                  Accompanying signs and symptoms:          Fever/Chills: no          Gas/Bloating: YES          Nausea/vomitting: no          Diarrhea: YES          Dysuria or Hematuria: no                  Now regulating it with diet                  Precipitating or alleviating factors:          Pain worse with eating/BM/urination: yes                         Therapies tried and outcome: fodmap diet                    LMP:  not applicable                                    Back Pain                         Duration:                  HAS CHRONIC LOW BACK PAIN HAS BEEN TO PAIN CLINIC  WAS PUT ON TIZANIDINE  DOES REGULAR BACK EXERCISES  Takes up to 2  A day of tizanidine with tylenol MRI - showed mild to moderate degenerative changes                    NEW PAIN UPPER BACK  Had it a few time over the  last 2-3 years                    3 months, worsening with yoga          Specific cause: none                  Description (location/character/radiation): mid back                    Intensity:  moderate, severe SHARP PAIN  SAME ON BOTH SIDE                    Accompanying signs and symptoms (weakness/fever/urinary symptoms): none                  History (similar episodes/surgery/injury):                    Precipitating or alleviating factors: YOGA                    Therapies tried and outcome: exercise, tylenol        Social Determinants of Health      Financial Resource Strain: Not on file   Food Insecurity: Not on file   Transportation Needs: Not on file   Physical Activity: Not on file   Stress: Not on file   Social Connections: Not on file   Intimate Partner Violence: Not on file   Housing Stability: Not on file            Family History         Family History   Problem Relation Age of Onset     Cancer Mother       Osteoporosis Mother       Cerebrovascular Disease Father       Alzheimer Disease Father       Genitourinary Problems Maternal Grandmother       Heart Disease Paternal Grandfather       Osteoporosis Sister       Arthritis Sister       Breast Cancer Paternal Grandmother       Colon Cancer No family hx of              ROS:14 point ROS neg other than the symptoms noted above in the HPI.           Allergies   Allergen Reactions     Hctz [Hydrochlorothiazide]         Low sodiume     Lexapro [Escitalopram]         nausea     Omeprazole Diarrhea     Sulfa Drugs Itching     Venlafaxine Nausea     Amlodipine Swelling       swelling of foot             Current Outpatient Medications   Medication     acetaminophen (ACETAMINOPHEN EXTRA STRENGTH) 500 MG tablet     albuterol (PROAIR HFA) 108 (90 Base) MCG/ACT inhaler     cholecalciferol 25 MCG (1000 UT) CHEW     cloNIDine (CATAPRES) 0.2 MG tablet     conjugated estrogens (PREMARIN) 0.625 MG/GM vaginal cream     Cranberry 140-100-3 MG-MG-UNIT CAPS     estradiol  (ESTRACE) 0.1 MG/GM vaginal cream     fluconazole (DIFLUCAN) 150 MG tablet     lisinopril (ZESTRIL) 40 MG tablet     mometasone (NASONEX) 50 MCG/ACT nasal spray     montelukast (SINGULAIR) 10 MG tablet     omeprazole (PRILOSEC OTC) 20 MG EC tablet     QUEtiapine (SEROQUEL) 100 MG tablet     rosuvastatin (CRESTOR) 5 MG tablet     tiZANidine (ZANAFLEX) 2 MG tablet     triamcinolone (KENALOG) 0.1 % cream     UNABLE TO FIND     valACYclovir (VALTREX) 1000 mg tablet      No current facility-administered medications for this visit.            PEx:     GEN: NAD       Urine: negative       A/P: Carmen Bonner is a 76 year old female with dysuria, poss rUTIs.  - U/A, UC when symptoms  -Continue estrogen cream, this will treat atrophic vaginitis and urethral changes which may impact her pelvic discomfort and improve her urinary urgency. Added bonus of UTI prevention. Estrace vs cmpd with refill to lessen her cost.   - Hydrate  - Lifestyle and hygiene modifications were reviewed today in clinic, including wiping front to back, wearing cotton breathable underwear, voiding before and after intercourse to flush the urethra, minimizing baths and opting for showers, and appropriate perineal hygiene.   -12 mo recheck or with PCP.     Sarah Herr PA-C  [facility]          28 minutes spent on the date of the encounter doing chart review, review of outside records, review of test results, interpretation of tests, patient visit and documentation

## 2022-08-02 ENCOUNTER — NURSE TRIAGE (OUTPATIENT)
Dept: FAMILY MEDICINE | Facility: CLINIC | Age: 77
End: 2022-08-02

## 2022-08-02 DIAGNOSIS — M25.561 CHRONIC PAIN OF BOTH KNEES: Primary | ICD-10-CM

## 2022-08-02 DIAGNOSIS — M25.562 CHRONIC PAIN OF BOTH KNEES: Primary | ICD-10-CM

## 2022-08-02 DIAGNOSIS — G89.29 CHRONIC PAIN OF BOTH KNEES: Primary | ICD-10-CM

## 2022-08-02 NOTE — TELEPHONE ENCOUNTER
Refer to orthopedics  Ask her to see Dr. Gagnon  Referral is placed  Please provide the phone number to the patient to call and set up the appointment  [phone]    Dr.Nasima Tonya MD

## 2022-08-02 NOTE — TELEPHONE ENCOUNTER
Writer called patient and reviewed provider's recommendation below. Gave patient phone number to call and schedule.    Patient appreciative of callback.  No further questions/concerns at this time.    Jacquelyn Dodson RN  [facility]

## 2022-08-02 NOTE — TELEPHONE ENCOUNTER
"Dr. Castaneda,    Pt called with ongoing knee pain (ongoing for years), has discussed with Dr. Castaneda before, but is now motivated by upcoming trip to [place] and worry about ambulation. Wanting to know next steps.    Knees were aggravated about 4 months ago by going to confessional/on knees, and has noticed a lot more pain in both knees, especially right knee since then.    \"Feels like  pain from tissue around the knee\"    Pt wondering if she should see a provider here first (openings?), or possibly a Ortho provider or PT?     Discussed that Dr. Castaneda may want her to have visit here first, or may want pt to see Sports Med/PT?        Triage summary:  PAIN  hurts- Not quite sharp, but definitely grabs attention \"felt wobbly\"   SEVERITY:     5, avoids going down stairs, sharp part are only a few seconds, but aggravation lasts  Onset:     About 4 months ago, (started with confessional on knees for a long time) - more pain with movement of knees, frequent sitting/standing.    RECURRENT:       Since Jan 2012 on and off, mild   AGGRAVATING FACTORS:      Sitting/standing repeatedly   ASSOCIATED SYMPTOMS:     Denies swelling/other sx,     Please advise,  Kavita Turner, EYAD  [facility] RN Triage Team        Protocols used: KNEE PAIN-A-OH    Reason for Disposition    MODERATE pain (e.g., symptoms interfere with work or school, limping) and present > 3 days    Additional Information    Negative: Sounds like a life-threatening emergency to the triager    Negative: Followed a knee injury    Negative: Swollen knee joint and fever    Negative: Can't move swollen joint at all    Negative: Thigh or calf pain and only 1 side and present > 1 hour    Negative: Thigh or calf swelling and only 1 side    Negative: Patient sounds very sick or weak to the triager    Negative: SEVERE pain (e.g., excruciating, unable to walk)    Negative: Very swollen joint    Negative: Painful rash with multiple small blisters grouped together " "(i.e., dermatomal distribution or 'band' or 'stripe')    Negative: Looks like a boil, infected sore, deep ulcer, or other infected rash (spreading redness, pus)    Answer Assessment - Initial Assessment Questions  1. LOCATION and RADIATION: \"Where is the pain located?\"      Knees  Mostly right   2. QUALITY: \"What does the pain feel like?\"  (e.g., sharp, dull, aching, burning)      Pain - hurts- Not quite sharp, but definitely grabs attention \"felt wobbly\"   3. SEVERITY: \"How bad is the pain?\" \"What does it keep you from doing?\"   (Scale 1-10; or mild, moderate, severe)    -  MILD (1-3): doesn't interfere with normal activities     -  MODERATE (4-7): interferes with normal activities (e.g., work or school) or awakens from sleep, limping     -  SEVERE (8-10): excruciating pain, unable to do any normal activities, unable to walk      5, avoids going down stairs, sharp part a few seconds   4. ONSET: \"When did the pain start?\" \"Does it come and go, or is it there all the time?\"     About 4 months ago, (started with confessional on knees for a long time) - more pain with movement of knees, frequent sitting/standing.    5. RECURRENT: \"Have you had this pain before?\" If so, ask: \"When, and what happened then?\"      Since Jan 2012 on and off, mild   6. SETTING: \"Has there been any recent work, exercise or other activity that involved that part of the body?\"       Confessional knee incident  7. AGGRAVATING FACTORS: \"What makes the knee pain worse?\" (e.g., walking, climbing stairs, running)      Sitting/standing repeatedly   8. ASSOCIATED SYMPTOMS: \"Is there any swelling or redness of the knee?\"      No swelling   9. OTHER SYMPTOMS: \"Do you have any other symptoms?\" (e.g., chest pain, difficulty breathing, fever, calf pain)  No  10. PREGNANCY: \"Is there any chance you are pregnant?\" \"When was your last menstrual period?\"        no    Protocols used: KNEE PAIN-A-OH      "

## 2022-08-24 ENCOUNTER — ANCILLARY PROCEDURE (OUTPATIENT)
Dept: GENERAL RADIOLOGY | Facility: CLINIC | Age: 77
End: 2022-08-24
Attending: FAMILY MEDICINE
Payer: MEDICARE

## 2022-08-24 ENCOUNTER — OFFICE VISIT (OUTPATIENT)
Dept: ORTHOPEDICS | Facility: CLINIC | Age: 77
End: 2022-08-24
Attending: INTERNAL MEDICINE

## 2022-08-24 VITALS — HEIGHT: 63 IN | WEIGHT: 171.5 LBS | BODY MASS INDEX: 30.39 KG/M2

## 2022-08-24 DIAGNOSIS — G89.29 CHRONIC PAIN OF BOTH KNEES: ICD-10-CM

## 2022-08-24 DIAGNOSIS — M25.562 CHRONIC PAIN OF BOTH KNEES: ICD-10-CM

## 2022-08-24 DIAGNOSIS — M25.561 CHRONIC PAIN OF BOTH KNEES: ICD-10-CM

## 2022-08-24 DIAGNOSIS — M17.11 PRIMARY OSTEOARTHRITIS OF RIGHT KNEE: Primary | ICD-10-CM

## 2022-08-24 PROCEDURE — 73562 X-RAY EXAM OF KNEE 3: CPT | Mod: TC | Performed by: INTERNAL MEDICINE

## 2022-08-24 PROCEDURE — 99203 OFFICE O/P NEW LOW 30 MIN: CPT | Performed by: FAMILY MEDICINE

## 2022-08-24 ASSESSMENT — PAIN SCALES - GENERAL: PAINLEVEL: MILD PAIN (2)

## 2022-08-24 NOTE — PATIENT INSTRUCTIONS
Thank you for choosing [facility]    DR ALVA[facility]S CLINIC LOCATIONS  [place] [address] [address] [facility], 4th [place]   [place], [place], [address] [place] [address]   [phone]       APPOINTMENTS: [phone]    CARE QUESTIONS: [phone],    BILLING QUESTIONS: [phone]    FAX NUMBER: [phone]        Follow up: as needed      1. Bilateral primary osteoarthritis of knee    2. Chronic pain of both knees      Physical Therapy orders have been placed with [facility] (formally [facility])  You can call [phone] to schedule at your convenience.         KNEE OSTEOARTHRITIS    WHAT IS KNEE OSTEOARTHRITIS?    Osteoarthritis is a disease in which the cartilage in your joints breaks down. Cartilage is tissue that lines and cushions the surface of joints. It covers the ends of bones and allows free movement of joints. If joint cartilage gets rough or wears away, the roughened cartilage or bone surfaces grind against each other. The joint gets irritated and swollen (inflamed). Sometimes the irritation causes abnormal growths of bone, called spurs.    Osteoarthritis most often affects joints in the feet, knees, hips, lower back, or fingers. It usually affects only one or a few joints at a time. It is a lifelong problem that can get worse over time. However, you can relieve symptoms and prevent or slow down joint damage by following your healthcare provider s treatment plan and taking care of yourself.    WHAT IS THE CAUSE?    The exact cause of osteoarthritis is not known. Things that may cause or contribute to osteoarthritis are:    Aging. Osteoarthritis slowly gets worse as you get older. Symptoms are usually not noticed until middle age.  Too much wear on joints. Obesity, bad posture, and overuse can cause extra wear on joints.  Injuries to joints from sports or accidents  Pressure.  Heavy lifting or contact sports can put pressure on joints and damage the cartilage.  Genes you have inherited. Genes are inside each cell of your body and are passed from parents to children. They contain the information that tells your body how to develop and work. A family history of osteoarthritis can double your risk of having osteoarthritis.    WHAT ARE THE SYMPTOMS?    Symptoms may include:    Mild to severe pain in a joint, which may be worse after long periods of bending, lifting or not moving  Creaking or grating sound in the joint  Swelling, stiffness, or limited movement of the joint, especially in the morning  Weakness in muscles around the sore joint from lack of use  Changes in the shape of the joint  The pain of osteoarthritis starts with activity and continues after you stop the activity. The stiffness of osteoarthritis wears off quickly (usually within 15 to 30 minutes) once you get moving in the morning.    HOW IS IT DIAGNOSED?    Your healthcare provider will ask about your symptoms and medical history and examine you. If there is a question about what type of arthritis you have or how severe your osteoarthritis is, other tests may include:    Blood tests  Joint aspiration, which uses a needle to take fluid from a joint for testing  X-rays  You may have other tests or scans to check for other possible causes of your symptoms.    HOW IS IT TREATED?    There is no cure for osteoarthritis, but treatment can help:    Relieve pain and stiffness  Reduce swelling  Keep the joints working properly  Stop or slow down damage to the joints  There are many ways to treat osteoarthritis.    Medicine    Nonprescription pain medicine, such as acetaminophen or NSAID creams, and patches.           Acetaminophen may cause liver damage or other problems. Don t take more than 4000 milligrams (mg) in 24 hours. To make sure you don t take too much, check other medicines you take to see if they also contain  acetaminophen. Unless recommended by your healthcare provider, you should not take this medicine for more than 10 days. Ask your provider if you need to avoid drinking alcohol while taking this medicine.    Oral NSAIDs, such as ibuprofen, naproxen, ketoprofen, and aspirin, may cause stomach bleeding and other problems. These risks increase with age. Read the label and take as directed. Unless recommended by your healthcare provider, you should not take this medicine for more than 10 days.    Steroid medicine may be prescribed to decrease pain and swelling. It can be given as a pill, cream or ointment, or shot. Using a steroid for a long time can have serious side effects. Take steroid medicine exactly as your healthcare provider prescribes. Don t take more or less of it than prescribed by your provider and don t take it longer than prescribed. Don t stop taking a steroid without your provider's approval. You may have to lower your dosage slowly before stopping it.  Hyaluronic acid may be injected into your knee if you have arthritis in your knee. It helps your knee move more easily.    Exercise  Three types of exercise may help:    Range-of-motion exercises are gentle stretching exercises that help you move each joint as far as possible. Examples include low-speed bike riding, hannah chi, and yoga. Range-of-motion exercises help you keep or improve your flexibility and relieve stiffness.  Strengthening exercise, such as weight training, makes muscles and tendons stronger. Strong muscles and tendons support joints better. You will be able to move more easily and with less pain.    Aerobic or endurance exercise at a moderate pace, such as walking or bicycle riding, improves your overall health and helps control your weight. Exercise in a warm swimming pool is a very helpful option. The water supports your weight while you move, and the warmth helps improve joint movement.    Talk with your healthcare provider before you  start an exercise program. Too much exercise too soon or even at the wrong time of day may make arthritis worse. Your provider may refer you to a physical therapist to design a program that is right for you.    Surgery    Your provider may advise arthroscopy, which is a type of surgery done with a small scope inserted into your joint. Your provider can look directly at your joint and sometimes make helpful repairs, such as removing bone spurs, without having to cut open the joint.    If you have a joint that is severely damaged, surgery may be done to replace your joint with an artificial joint.    Other treatments    Your healthcare provider may recommend physical or occupational therapy to treat pain and help you have better use of your joints.  Although the evidence is not conclusive, some people seem to benefit from the natural remedies glucosamine and chondroitin sulfate. Acupuncture may also help reduce pain and stiffness in the joints.  Transcutaneous electrical nerve stimulation (TENS) may relieve some types of arthritis pain. TENS directs mild electric pulses through the skin to nerves in the painful area.  Sometimes it may help to use a splint or brace to rest the joint and protect it from injury.    HOW CAN I TAKE CARE OF MYSELF?    Follow the full course of treatment prescribed by your healthcare provider.  Rest your joints when they are hot, swollen, or painful.    Learn how to move in ways that are easier on your joints. Be open to using devices to help you. Helpful devices may include canes and walkers; bath seats and grab bars for the bathtub; and larger  on tools, eating utensils, pens, and pencils. Velcro fasteners on clothes and shoes can be very useful, too.    Rubbing deep-heat creams on a painful joint can give short-term relief. Putting an ice pack on the joint once or twice a day can also help relieve pain. See which works best for you. Some people get relief by alternating heat and cold  packs. Hot paraffin baths can help symptoms in the hands and feet.    Eat a healthy diet. Ask your provider about the benefits of talking to a dietician to learn what you need in a healthy diet.    Try to keep a healthy weight. If you are overweight, lose weight. Losing some weight can reduce the stress on your joints.    If you smoke, try to quit. Talk to your healthcare provider about ways to quit smoking.    Try to get at least 7 to 9 hours of sleep each night.  Join a support group or take classes on how to manage your arthritis.    Ask your healthcare provider:  How and when you will hear your test results  What activities you should avoid and when you can return to normal activities  What symptoms or problems you should watch for and what to do if you have them  Make sure you know when you should come back for a checkup.    Developed by [employer].  Published by [employer].  Copyright  2014 [employer] and/or one of its subsidiaries. All rights reserved.

## 2022-08-24 NOTE — PROGRESS NOTES
"CHIEF COMPLAINT:  Pain of the Right Knee and Pain of the Left Knee       HISTORY OF PRESENT ILLNESS  Ms. Bonner is a pleasant 76 year old year old female who presents to clinic today with bilateral knee pain.  Carmen explains that she began to notice anterior knee pain when she returned to [employer] and was doing more frequent kneeling.     Location: bilateral knee  Quality:  aching and sharp  Duration: 4-5 months   Severity: 3/10 at worst  Timing:intermittent episodes   Modifying factors:  resting and non-use makes it better, movement and use makes it worse  Associated signs & symptoms: anterior knee pain with kneeling, sitting, \"tightness\", episodes of instability, after sit to stand  Previous similar pain: No  Treatments to date: activity modification    Additional history: Strong family history OA in siblings and mother    Review of Systems:A 10-point review of systems was obtained and is negative except for as noted in the HPI.       MEDICAL HISTORY  Patient Active Problem List   Diagnosis     BMI 31     CARDIOVASCULAR SCREENING; LDL GOAL LESS THAN 130     HTN, goal below 140/90     Intermittent asthma, uncomplicated     GERD (gastroesophageal reflux disease)     Insomnia due to other mental disorder     Arthritis     Lumbar spondylosis     Upper back pain     Hypertension     Pain in thoracic spine     Stool incontinence     Advanced directives, counseling/discussion     Allergic rhinitis     Hyperlipidemia, unspecified hyperlipidemia type     History of paroxysmal supraventricular tachycardia     Generalized anxiety disorder     Anemia, unspecified type     Joint stiffness     Acute midline thoracic back pain     Physical deconditioning     Vitamin D deficiency     Benign essential hypertension     Primary insomnia     Osteopenia, unspecified location     Iron deficiency     Recurrent urinary tract infection       Current Outpatient Medications   Medication Sig Dispense Refill     acetaminophen (TYLENOL) 500 MG " tablet Take 500-1,000 mg by mouth every 6 hours as needed for mild pain       albuterol (PROAIR HFA) 108 (90 Base) MCG/ACT inhaler Inhale 2 puffs into the lungs every 4 hours as needed for shortness of breath / dyspnea or wheezing 8.5 Inhaler 11     cloNIDine (CATAPRES) 0.2 MG tablet Take 1 tablet (0.2 mg) by mouth 2 times daily for Blood Pressure 180 tablet 1     conjugated estrogens (PREMARIN) 0.625 MG/GM vaginal cream Place 1 g vaginally twice a week 30 g 1     lisinopril (ZESTRIL) 40 MG tablet Take 1 tablet (40 mg) by mouth daily for Blood Pressure 90 tablet 3     montelukast (SINGULAIR) 10 MG tablet Take 1 tablet (10 mg) by mouth At Bedtime 90 tablet 3     nystatin (MYCOSTATIN) 290626 UNIT/GM external powder Apply topically 2 times daily 60 g 0     QUEtiapine (SEROQUEL) 100 MG tablet Take 1.5 tablets (150 mg) by mouth At Bedtime for sleep 135 tablet 3     tiZANidine (ZANAFLEX) 2 MG tablet TAKE 1-2 TABLETS BY MOUTH DAILY AS NEEDED FOR MUSCLE SPASMS 180 tablet 1     triamcinolone (KENALOG) 0.1 % cream Apply topically 2 times daily 45 g 1     valACYclovir (VALTREX) 1000 mg tablet Take 1 tablet (1,000 mg) by mouth as needed Herpes Outbreak 12 tablet 1     cholecalciferol 25 MCG (1000 UT) CHEW Take 1 chew tab by mouth daily       Cranberry 140-100-3 MG-MG-UNIT CAPS Take 1 capsule by mouth daily       mometasone (NASONEX) 50 MCG/ACT nasal spray Spray 2 sprays into both nostrils daily 1 Box 3     omeprazole (PRILOSEC OTC) 20 MG EC tablet Take 1 tablet (20 mg) by mouth daily (Patient not taking: Reported on 8/24/2022) 90 tablet 3     rosuvastatin (CRESTOR) 5 MG tablet Take 1 tablet (5 mg) by mouth daily for cholestrol (Patient not taking: Reported on 8/24/2022) 90 tablet 3     UNABLE TO FIND Take 2 capsules by mouth daily MEDICATION NAME: Energy & Fat Metabolism Factors         Allergies   Allergen Reactions     Hctz [Hydrochlorothiazide]      Low sodiume     Lexapro [Escitalopram]      nausea     Omeprazole Diarrhea  "    Sulfa Drugs Itching     Venlafaxine Nausea     Amlodipine Swelling     swelling of foot       Family History   Problem Relation Age of Onset     Cancer Mother      Osteoporosis Mother      Cerebrovascular Disease Father      Alzheimer Disease Father      Genitourinary Problems Maternal Grandmother      Heart Disease Paternal Grandfather      Osteoporosis Sister      Arthritis Sister      Breast Cancer Paternal Grandmother      Colon Cancer No family hx of        Additional medical/Social/Surgical histories reviewed in [facility] and updated as appropriate.       PHYSICAL EXAM  Ht 1.588 m (5' 2.5\")   Wt 77.8 kg (171 lb 8 oz)   BMI 30.87 kg/m      General  - normal appearance, in no obvious distress  Musculoskeletal - bilateral knee  - stance: mildly antalgic gait, genu varum on right.  - inspection: generalized swelling, trace effusion  - palpation: medial joint line tenderness on right,  Patellar facets tender on left  - ROM: 100 degrees flexion, 0 degrees extension, painful active ROM on right.  120 to 0 degrees motion on left.  - strength: 5/5 in flexion, 45 in extension bilateral  - special tests:  (-) Lachman  (-) anterior drawer  (-) Alexander  (-) varus at 0 and 30 degrees flexion  (-) valgus at 0 and 30 degrees flexion  Neuro  - no sensory or motor deficit, grossly normal coordination, normal muscle tone  Skin  - no ecchymosis, erythema, warmth, or induration, no obvious rash  Psych  - interactive, appropriate, normal mood and affect    IMAGING : XR bilateral knee 3V. Final results and radiologist's interpretation, available in the [facility] health record. Images were reviewed with the patient/family members in the office today. My personal interpretation of the performed imaging is moderately severe medial compartment narrowing of right knee.       ASSESSMENT & PLAN  Ms. Bonner is a 76 year old year old female who presents to clinic today with right>> left knee pain    Significant DJD of right knee medial " compartment consistent with her pain.  Possibly increasing left knee pain due to favoring and altered gait.    Diagnosis:  1. Primary osteoarthritis of right knee  2. Chronic pain of bilateral knees    Discussed osteoarthritis, pathophysiology, treatments.  At this time, she is happy to know about her diagnosis for her pain first and foremost.  She not interested in pursuing any intervention such as injections, surgery at this time.  I have recommended that she consider Voltaren gel.  We discussed formal physical therapy for strengthening of her knee, learning methods for working around her osteoarthritis and developing a robust home exercise program she can continue for years to come.  She has worked with Edilia Baum in the past and I have recommended that she return for knee OA as well.    She can follow-up in the next 6 to 8 weeks or after PT course if she desires additional intervention or discussion about her progress or lack thereof.    It was a pleasure seeing Carmen today.    Velasquez Gagnon DO, [facility]  Primary Care Sports Medicine

## 2022-08-24 NOTE — LETTER
"    8/24/2022         RE: Carmen Bonner  [address]  [facility] [id number]        Dear Colleague,    Thank you for referring your patient, Carmen Bonner, to the [facility] [place]. Please see a copy of my visit note below.    CHIEF COMPLAINT:  Pain of the Right Knee and Pain of the Left Knee       HISTORY OF PRESENT ILLNESS  Ms. Bonner is a pleasant 76 year old year old female who presents to clinic today with bilateral knee pain.  Carmen explains that she began to notice anterior knee pain when she returned to [employer] and was doing more frequent kneeling.     Location: bilateral knee  Quality:  aching and sharp  Duration: 4-5 months   Severity: 3/10 at worst  Timing:intermittent episodes   Modifying factors:  resting and non-use makes it better, movement and use makes it worse  Associated signs & symptoms: anterior knee pain with kneeling, sitting, \"tightness\", episodes of instability, after sit to stand  Previous similar pain: No  Treatments to date: activity modification    Additional history: Strong family history OA in siblings and mother    Review of Systems:A 10-point review of systems was obtained and is negative except for as noted in the HPI.       MEDICAL HISTORY  Patient Active Problem List   Diagnosis     BMI 31     CARDIOVASCULAR SCREENING; LDL GOAL LESS THAN 130     HTN, goal below 140/90     Intermittent asthma, uncomplicated     GERD (gastroesophageal reflux disease)     Insomnia due to other mental disorder     Arthritis     Lumbar spondylosis     Upper back pain     Hypertension     Pain in thoracic spine     Stool incontinence     Advanced directives, counseling/discussion     Allergic rhinitis     Hyperlipidemia, unspecified hyperlipidemia type     History of paroxysmal supraventricular tachycardia     Generalized anxiety disorder     Anemia, unspecified type     Joint stiffness     Acute midline thoracic back pain     Physical deconditioning     " Vitamin D deficiency     Benign essential hypertension     Primary insomnia     Osteopenia, unspecified location     Iron deficiency     Recurrent urinary tract infection       Current Outpatient Medications   Medication Sig Dispense Refill     acetaminophen (TYLENOL) 500 MG tablet Take 500-1,000 mg by mouth every 6 hours as needed for mild pain       albuterol (PROAIR HFA) 108 (90 Base) MCG/ACT inhaler Inhale 2 puffs into the lungs every 4 hours as needed for shortness of breath / dyspnea or wheezing 8.5 Inhaler 11     cloNIDine (CATAPRES) 0.2 MG tablet Take 1 tablet (0.2 mg) by mouth 2 times daily for Blood Pressure 180 tablet 1     conjugated estrogens (PREMARIN) 0.625 MG/GM vaginal cream Place 1 g vaginally twice a week 30 g 1     lisinopril (ZESTRIL) 40 MG tablet Take 1 tablet (40 mg) by mouth daily for Blood Pressure 90 tablet 3     montelukast (SINGULAIR) 10 MG tablet Take 1 tablet (10 mg) by mouth At Bedtime 90 tablet 3     nystatin (MYCOSTATIN) 342770 UNIT/GM external powder Apply topically 2 times daily 60 g 0     QUEtiapine (SEROQUEL) 100 MG tablet Take 1.5 tablets (150 mg) by mouth At Bedtime for sleep 135 tablet 3     tiZANidine (ZANAFLEX) 2 MG tablet TAKE 1-2 TABLETS BY MOUTH DAILY AS NEEDED FOR MUSCLE SPASMS 180 tablet 1     triamcinolone (KENALOG) 0.1 % cream Apply topically 2 times daily 45 g 1     valACYclovir (VALTREX) 1000 mg tablet Take 1 tablet (1,000 mg) by mouth as needed Herpes Outbreak 12 tablet 1     cholecalciferol 25 MCG (1000 UT) CHEW Take 1 chew tab by mouth daily       Cranberry 140-100-3 MG-MG-UNIT CAPS Take 1 capsule by mouth daily       mometasone (NASONEX) 50 MCG/ACT nasal spray Spray 2 sprays into both nostrils daily 1 Box 3     omeprazole (PRILOSEC OTC) 20 MG EC tablet Take 1 tablet (20 mg) by mouth daily (Patient not taking: Reported on 8/24/2022) 90 tablet 3     rosuvastatin (CRESTOR) 5 MG tablet Take 1 tablet (5 mg) by mouth daily for cholestrol (Patient not taking: Reported  "on 8/24/2022) 90 tablet 3     UNABLE TO FIND Take 2 capsules by mouth daily MEDICATION NAME: Energy & Fat Metabolism Factors         Allergies   Allergen Reactions     Hctz [Hydrochlorothiazide]      Low sodiume     Lexapro [Escitalopram]      nausea     Omeprazole Diarrhea     Sulfa Drugs Itching     Venlafaxine Nausea     Amlodipine Swelling     swelling of foot       Family History   Problem Relation Age of Onset     Cancer Mother      Osteoporosis Mother      Cerebrovascular Disease Father      Alzheimer Disease Father      Genitourinary Problems Maternal Grandmother      Heart Disease Paternal Grandfather      Osteoporosis Sister      Arthritis Sister      Breast Cancer Paternal Grandmother      Colon Cancer No family hx of        Additional medical/Social/Surgical histories reviewed in [facility] and updated as appropriate.       PHYSICAL EXAM  Ht 1.588 m (5' 2.5\")   Wt 77.8 kg (171 lb 8 oz)   BMI 30.87 kg/m      General  - normal appearance, in no obvious distress  Musculoskeletal - bilateral knee  - stance: mildly antalgic gait, genu varum on right.  - inspection: generalized swelling, trace effusion  - palpation: medial joint line tenderness on right,  Patellar facets tender on left  - ROM: 100 degrees flexion, 0 degrees extension, painful active ROM on right.  120 to 0 degrees motion on left.  - strength: 5/5 in flexion, 45 in extension bilateral  - special tests:  (-) Lachman  (-) anterior drawer  (-) Alexander  (-) varus at 0 and 30 degrees flexion  (-) valgus at 0 and 30 degrees flexion  Neuro  - no sensory or motor deficit, grossly normal coordination, normal muscle tone  Skin  - no ecchymosis, erythema, warmth, or induration, no obvious rash  Psych  - interactive, appropriate, normal mood and affect    IMAGING : XR bilateral knee 3V. Final results and radiologist's interpretation, available in the [facility] health record. Images were reviewed with the patient/family members in the office today. My personal " interpretation of the performed imaging is moderately severe medial compartment narrowing of right knee.       ASSESSMENT & PLAN  Ms. Bonner is a 76 year old year old female who presents to clinic today with right>> left knee pain    Significant DJD of right knee medial compartment consistent with her pain.  Possibly increasing left knee pain due to favoring and altered gait.    Diagnosis:  1. Primary osteoarthritis of right knee  2. Chronic pain of bilateral knees    Discussed osteoarthritis, pathophysiology, treatments.  At this time, she is happy to know about her diagnosis for her pain first and foremost.  She not interested in pursuing any intervention such as injections, surgery at this time.  I have recommended that she consider Voltaren gel.  We discussed formal physical therapy for strengthening of her knee, learning methods for working around her osteoarthritis and developing a robust home exercise program she can continue for years to come.  She has worked with Edilia Baum in the past and I have recommended that she return for knee OA as well.    She can follow-up in the next 6 to 8 weeks or after PT course if she desires additional intervention or discussion about her progress or lack thereof.    It was a pleasure seeing Carmen today.    Velasquez Gagnon DO, [facility]  Primary Care Sports Medicine      Again, thank you for allowing me to participate in the care of your patient.        Sincerely,        Velasquez Gagnon DO

## 2022-08-30 ENCOUNTER — THERAPY VISIT (OUTPATIENT)
Dept: PHYSICAL THERAPY | Facility: CLINIC | Age: 77
End: 2022-08-30
Attending: FAMILY MEDICINE
Payer: MEDICARE

## 2022-08-30 DIAGNOSIS — M17.11 PRIMARY OSTEOARTHRITIS OF RIGHT KNEE: ICD-10-CM

## 2022-08-30 PROCEDURE — 97110 THERAPEUTIC EXERCISES: CPT | Mod: GP | Performed by: PHYSICAL THERAPIST

## 2022-08-30 PROCEDURE — 97161 PT EVAL LOW COMPLEX 20 MIN: CPT | Mod: GP | Performed by: PHYSICAL THERAPIST

## 2022-08-30 ASSESSMENT — ACTIVITIES OF DAILY LIVING (ADL)
WALK: ACTIVITY IS MINIMALLY DIFFICULT
HOW_WOULD_YOU_RATE_THE_CURRENT_FUNCTION_OF_YOUR_KNEE_DURING_YOUR_USUAL_DAILY_ACTIVITIES_ON_A_SCALE_FROM_0_TO_100_WITH_100_BEING_YOUR_LEVEL_OF_KNEE_FUNCTION_PRIOR_TO_YOUR_INJURY_AND_0_BEING_THE_INABILITY_TO_PERFORM_ANY_OF_YOUR_USUAL_DAILY_ACTIVITIES?: 85
KNEE_ACTIVITY_OF_DAILY_LIVING_SCORE: 67.69
AS_A_RESULT_OF_YOUR_KNEE_INJURY,_HOW_WOULD_YOU_RATE_YOUR_CURRENT_LEVEL_OF_DAILY_ACTIVITY?: NEARLY NORMAL
PAIN: THE SYMPTOM AFFECTS MY ACTIVITY SLIGHTLY
LIMPING: THE SYMPTOM AFFECTS MY ACTIVITY SLIGHTLY
GO UP STAIRS: ACTIVITY IS SOMEWHAT DIFFICULT
RISE FROM A CHAIR: ACTIVITY IS SOMEWHAT DIFFICULT
SWELLING: I DO NOT HAVE THE SYMPTOM
WEAKNESS: I HAVE THE SYMPTOM BUT IT DOES NOT AFFECT MY ACTIVITY
GIVING WAY, BUCKLING OR SHIFTING OF KNEE: I HAVE THE SYMPTOM BUT IT DOES NOT AFFECT MY ACTIVITY
SQUAT: NOT ANSWERED
SIT WITH YOUR KNEE BENT: ACTIVITY IS MINIMALLY DIFFICULT
STIFFNESS: THE SYMPTOM AFFECTS MY ACTIVITY SLIGHTLY
GO DOWN STAIRS: ACTIVITY IS SOMEWHAT DIFFICULT
KNEE_ACTIVITY_OF_DAILY_LIVING_SUM: 44
KNEEL ON THE FRONT OF YOUR KNEE: ACTIVITY IS VERY DIFFICULT
HOW_WOULD_YOU_RATE_THE_OVERALL_FUNCTION_OF_YOUR_KNEE_DURING_YOUR_USUAL_DAILY_ACTIVITIES?: NEARLY NORMAL
RAW_SCORE: 47.38
STAND: ACTIVITY IS MINIMALLY DIFFICULT

## 2022-08-30 NOTE — PROGRESS NOTES
[facility]    OUTPATIENT Physical Therapy ORTHOPEDIC EVALUATION  PLAN OF TREATMENT FOR OUTPATIENT REHABILITATION  (COMPLETE FOR INITIAL CLAIMS ONLY)  Patient's Last Name, First Name, M.I.  YOB: 1945  Carmen Bonner    Provider s Name:  [facility]   Medical Record No.  [id number]   Start of Care Date:  08/30/22   Onset Date:    (referral 8/24/2022)   Type:     _X__PT   ___OT Medical Diagnosis:    Encounter Diagnosis   Name Primary?    Primary osteoarthritis of right knee         Treatment Diagnosis:  R > L knee pain,        Goals:     08/30/22 0500   Body Part   Goals listed below are for knees   Goal #1   Goal #1 ambulation   Previous Functional Level No restrictions   Current Functional Level Blocks patient can walk   Performance Level 1   STG Target Performance Blocks patient will be able to  walk   Performance Level 2   Rationale for safe community ambulation   Due Date 10/11/22    LTG [employer] Performance Blocks patient will be able to walk   Performance Level 4   Rationale for safe community ambulation   Due Date 11/22/22         Therapy Frequency:  1x/week for 4 weeks tapering to 2x/mo for 8 weeks  Predicted Duration of Therapy Intervention:  12 weeks    Kaylee Khan, PT                 I CERTIFY THE NEED FOR THESE SERVICES FURNISHED UNDER        THIS PLAN OF TREATMENT AND WHILE UNDER MY CARE     (Physician attestation of this document indicates review and certification of the therapy plan).                     Certification Date From:  08/30/22   Certification Date To:  11/22/22    Referring Provider:  Velasquez Gagnon    Initial Assessment        See Epic Evaluation SOC Date: 08/30/22

## 2022-08-30 NOTE — PROGRESS NOTES
Physical Therapy Initial Evaluation  Subjective:  Carmen presents to PT for treatment of bilateral knee pain with referral dated 8/24/2022.  She reports onset on symptoms in mid to late March of 2022 after prolonged kneeling at [employer].  She complains right greater than left anterior knee pain.  Symptoms are worse with sit to stand and patient is avoiding stairs.  Patient also reports having sciatica on there right and right hip soreness.    Past Medical History:  No date: Allergic rhinitis  No date: Anxiety      Comment:  on seroquel  No date: Asthma  No date: Cervicalgia  No date: Chronic pain  No date: Diabetes (H)      Comment:  now diet controlled  No date: DJD (degenerative joint disease) of lumbar spine      Comment:  CHRONIC HAS SEEN PAIN CLINIC- put on tizanidine   No date: Endometriosis  No date: Finger pain  No date: Finger swelling  No date: Flank pain  No date: GERD (gastroesophageal reflux disease)  No date: Hematuria  No date: Herpes  No date: Hypertension  No date: Hyponatremia  No date: Insomnia      Comment:  on seroquel  No date: Insomnia  No date: Knee pain  No date: Mumps  No date: Purpura (H)      Comment:  SAW DERM   No date: Recurrent UTI  No date: Skin cancer      Comment:  basal cell   No date: Stress incontinence  No date: Upper back pain          XR IMPRESSION:       Right: Advanced medial compartment narrowing with minimal hypertrophic  change. Normal patellar alignment. No knee joint effusion.     Left: No joint space narrowing. No knee joint effusion. Normal  patellar alignment.       The history is provided by the patient.   Patient Health History  Carmen Bonner being seen for knee arthritis pain.       Problem occurred: age   Pain is reported as 2/10 on pain scale.  General health as reported by patient is good.  Pertinent medical history includes: chemical dependency, depression, high blood pressure, mental illness, overweight, osteoarthritis and smoking.     Medical allergies:  latex.   Surgeries include:  Other. Other surgery history details: hysterectomy, gall bladder removal.    Current medications:  High blood pressure medication, muscle relaxants and sleep medication.    Current occupation is retired none.   Primary job tasks include:  Other.   Other job/home tasks details: housework.                                  Objective:  Standing Alignment:              Knee deviations alignment: pt stands in mild left knee flexion and full right extension.      Gait:  Mildly antalgic without AD                                                          Knee Evaluation:  ROM:  AROM: normal (knee ROM assessed in sitting as pt unable to get supine on treatment table due to right hip and low back pain)  Strength:  Normal            Ligament Testing:  Not Assessed                Special Tests: Not Assessed      Palpation:      Right knee tenderness present at:  Medial Joint Line  Edema:  Normal      Functional Testing:  : patient requires assist of arms on arm rests for sit to stand with right anterior knee complaint.          Proprioception:   Stork Balance Test:  Left:  3 sec  Right:  3 sec  % of Uninvolved:       Patient relies heavily on table for step up and step down from 8 in stool.    General     ROS    Assessment/Plan:    Patient is a 76 year old female with both sides knee complaints.    Patient has the following significant findings with corresponding treatment plan.                Diagnosis 1:  Right greater than left knee pain  Pain -  self management, education and home program  Impaired gait - gait training  Decreased function - therapeutic activities    Therapy Evaluation Codes:   1) History comprised of:   Personal factors that impact the plan of care:      None.    Comorbidity factors that impact the plan of care are:      None.     Medications impacting care: None.  2) Examination of Body Systems comprised of:   Body structures and functions that impact the plan of care:       Knee.   Activity limitations that impact the plan of care are:      Squatting/kneeling, Stairs, Walking and sit to stand.  3) Clinical presentation characteristics are:   Stable/Uncomplicated.  4) Decision-Making    Low complexity using standardized patient assessment instrument and/or measureable assessment of functional outcome.  Cumulative Therapy Evaluation is: Low complexity.    Previous and current functional limitations:  (See Goal Flow Sheet for this information)    Short term and Long term goals: (See Goal Flow Sheet for this information)     Communication ability:  Patient appears to be able to clearly communicate and understand verbal and written communication and follow directions correctly.  Treatment Explanation - The following has been discussed with the patient:   RX ordered/plan of care  Anticipated outcomes  Possible risks and side effects  This patient would benefit from PT intervention to resume normal activities.   Rehab potential is good.    Frequency:  1 X week, once daily  Duration:  for 4 weeks tapering to 2x/mo for 8 weeks  Discharge Plan:  Achieve all LTG.  Independent in home treatment program.  Reach maximal therapeutic benefit.    Please refer to the daily flowsheet for treatment today, total treatment time and time spent performing 1:1 timed codes.

## 2022-09-08 ASSESSMENT — ENCOUNTER SYMPTOMS
JOINT SWELLING: 0
SHORTNESS OF BREATH: 1
HEMATURIA: 0
COUGH: 0
WEAKNESS: 0
CONSTIPATION: 1
EYE PAIN: 0
NAUSEA: 0
DYSURIA: 0
NERVOUS/ANXIOUS: 1
DIZZINESS: 0
CHILLS: 0
DIARRHEA: 0
HEARTBURN: 1
HEMATOCHEZIA: 0
ARTHRALGIAS: 1
PARESTHESIAS: 0
SORE THROAT: 0
FREQUENCY: 0
HEADACHES: 0
FEVER: 0
PALPITATIONS: 0
BREAST MASS: 0
MYALGIAS: 1
ABDOMINAL PAIN: 0

## 2022-09-08 ASSESSMENT — ASTHMA QUESTIONNAIRES
QUESTION_3 LAST FOUR WEEKS HOW OFTEN DID YOUR ASTHMA SYMPTOMS (WHEEZING, COUGHING, SHORTNESS OF BREATH, CHEST TIGHTNESS OR PAIN) WAKE YOU UP AT NIGHT OR EARLIER THAN USUAL IN THE MORNING: NOT AT ALL
QUESTION_5 LAST FOUR WEEKS HOW WOULD YOU RATE YOUR ASTHMA CONTROL: WELL CONTROLLED
QUESTION_2 LAST FOUR WEEKS HOW OFTEN HAVE YOU HAD SHORTNESS OF BREATH: ONCE OR TWICE A WEEK
ACT_TOTALSCORE: 22
QUESTION_4 LAST FOUR WEEKS HOW OFTEN HAVE YOU USED YOUR RESCUE INHALER OR NEBULIZER MEDICATION (SUCH AS ALBUTEROL): ONCE A WEEK OR LESS
QUESTION_1 LAST FOUR WEEKS HOW MUCH OF THE TIME DID YOUR ASTHMA KEEP YOU FROM GETTING AS MUCH DONE AT WORK, SCHOOL OR AT HOME: NONE OF THE TIME
ACT_TOTALSCORE: 22

## 2022-09-08 ASSESSMENT — ACTIVITIES OF DAILY LIVING (ADL): CURRENT_FUNCTION: NO ASSISTANCE NEEDED

## 2022-09-09 ENCOUNTER — THERAPY VISIT (OUTPATIENT)
Dept: PHYSICAL THERAPY | Facility: CLINIC | Age: 77
End: 2022-09-09
Payer: MEDICARE

## 2022-09-09 DIAGNOSIS — M25.562 BILATERAL KNEE PAIN: Primary | ICD-10-CM

## 2022-09-09 DIAGNOSIS — M25.561 BILATERAL KNEE PAIN: Primary | ICD-10-CM

## 2022-09-09 PROCEDURE — 97110 THERAPEUTIC EXERCISES: CPT | Mod: GP | Performed by: PHYSICAL THERAPIST

## 2022-09-14 ENCOUNTER — TELEPHONE (OUTPATIENT)
Dept: FAMILY MEDICINE | Facility: CLINIC | Age: 77
End: 2022-09-14

## 2022-09-14 DIAGNOSIS — B37.31 YEAST INFECTION OF THE VAGINA: Primary | ICD-10-CM

## 2022-09-14 RX ORDER — FLUCONAZOLE 150 MG/1
150 TABLET ORAL
Qty: 3 TABLET | Refills: 0 | Status: SHIPPED | OUTPATIENT
Start: 2022-09-14

## 2022-09-14 NOTE — TELEPHONE ENCOUNTER
Outpatient Medication Detail     Disp Refills Start End JUAREZ   fluconazole (DIFLUCAN) 150 MG tablet (Discontinued) 1 tablet 1 1/28/2022 4/25/2022 --   Sig - Route: Take 1 tablet (150 mg) by mouth once as needed - Oral   Sent to pharmacy as: Fluconazole 150 MG Oral Tablet (Diflucan)   Class: E-Prescribe   Order: [id number]   E-Prescribing Status: Receipt confirmed by pharmacy (1/28/2022  3:15 PM CST)   E-Cancel Status: Request approved by pharmacy (4/25/2022  1:34 PM CDT)       E-Cancel Status Note: Some or all of Rx received:01/29/22     Pharmacy    [id number] - [place], [place] - [address]       This Order Has Been Discontinued    Order Status Reason By On   Discontinued None Matty Griggs, Latoya Barksdale, TRINIDAD 4/25/22 1334     Associated Diagnoses    Yeast infection of the vagina [B37.3]           Fax from pharmacy - patient requesting Rx for fluconazole    No longer active in chart      Patient scheduled for annual exam 9/15/2022 with Dr Castaneda     Appointments in Next Year    Sep 15, 2022  1:00 PM  (Arrive by 12:40 PM)  Annual Wellness Visit with Maribell Castaneda MD  [facility] ([facility] ) [phone]       RT Latoya (R)

## 2022-09-15 ENCOUNTER — OFFICE VISIT (OUTPATIENT)
Dept: FAMILY MEDICINE | Facility: CLINIC | Age: 77
End: 2022-09-15
Payer: MEDICARE

## 2022-09-15 VITALS
DIASTOLIC BLOOD PRESSURE: 70 MMHG | BODY MASS INDEX: 29.95 KG/M2 | SYSTOLIC BLOOD PRESSURE: 150 MMHG | WEIGHT: 169 LBS | HEART RATE: 55 BPM | HEIGHT: 63 IN | TEMPERATURE: 97.4 F | RESPIRATION RATE: 18 BRPM | OXYGEN SATURATION: 98 %

## 2022-09-15 DIAGNOSIS — Z78.0 ASYMPTOMATIC MENOPAUSAL STATE: ICD-10-CM

## 2022-09-15 DIAGNOSIS — Z23 HIGH PRIORITY FOR 2019-NCOV VACCINE: ICD-10-CM

## 2022-09-15 DIAGNOSIS — E78.5 HYPERLIPIDEMIA, UNSPECIFIED HYPERLIPIDEMIA TYPE: ICD-10-CM

## 2022-09-15 DIAGNOSIS — Z23 NEED FOR PROPHYLACTIC VACCINATION AND INOCULATION AGAINST INFLUENZA: ICD-10-CM

## 2022-09-15 DIAGNOSIS — Z79.899 MEDICATION MANAGEMENT: ICD-10-CM

## 2022-09-15 DIAGNOSIS — M85.80 OSTEOPENIA, UNSPECIFIED LOCATION: ICD-10-CM

## 2022-09-15 DIAGNOSIS — Z00.00 ENCOUNTER FOR MEDICARE ANNUAL WELLNESS EXAM: Primary | ICD-10-CM

## 2022-09-15 DIAGNOSIS — I10 BENIGN ESSENTIAL HYPERTENSION: ICD-10-CM

## 2022-09-15 DIAGNOSIS — E61.1 IRON DEFICIENCY: ICD-10-CM

## 2022-09-15 DIAGNOSIS — Z13.29 SCREENING FOR THYROID DISORDER: ICD-10-CM

## 2022-09-15 DIAGNOSIS — Z13.0 SCREENING FOR DEFICIENCY ANEMIA: ICD-10-CM

## 2022-09-15 LAB
ERYTHROCYTE [DISTWIDTH] IN BLOOD BY AUTOMATED COUNT: 12.2 % (ref 10–15)
HCT VFR BLD AUTO: 40.9 % (ref 35–47)
HGB BLD-MCNC: 13.2 G/DL (ref 11.7–15.7)
MCH RBC QN AUTO: 27.7 PG (ref 26.5–33)
MCHC RBC AUTO-ENTMCNC: 32.3 G/DL (ref 31.5–36.5)
MCV RBC AUTO: 86 FL (ref 78–100)
PLATELET # BLD AUTO: 183 10E3/UL (ref 150–450)
RBC # BLD AUTO: 4.76 10E6/UL (ref 3.8–5.2)
WBC # BLD AUTO: 6 10E3/UL (ref 4–11)

## 2022-09-15 PROCEDURE — 0124A COVID-19,PF,PFIZER BOOSTER BIVALENT: CPT | Performed by: INTERNAL MEDICINE

## 2022-09-15 PROCEDURE — 84443 ASSAY THYROID STIM HORMONE: CPT | Performed by: INTERNAL MEDICINE

## 2022-09-15 PROCEDURE — 91312 COVID-19,PF,PFIZER BOOSTER BIVALENT: CPT | Performed by: INTERNAL MEDICINE

## 2022-09-15 PROCEDURE — 36415 COLL VENOUS BLD VENIPUNCTURE: CPT | Performed by: INTERNAL MEDICINE

## 2022-09-15 PROCEDURE — 99213 OFFICE O/P EST LOW 20 MIN: CPT | Mod: 25 | Performed by: INTERNAL MEDICINE

## 2022-09-15 PROCEDURE — 90662 IIV NO PRSV INCREASED AG IM: CPT | Performed by: INTERNAL MEDICINE

## 2022-09-15 PROCEDURE — 82728 ASSAY OF FERRITIN: CPT | Performed by: INTERNAL MEDICINE

## 2022-09-15 PROCEDURE — G0439 PPPS, SUBSEQ VISIT: HCPCS | Performed by: INTERNAL MEDICINE

## 2022-09-15 PROCEDURE — 85027 COMPLETE CBC AUTOMATED: CPT | Performed by: INTERNAL MEDICINE

## 2022-09-15 PROCEDURE — G0008 ADMIN INFLUENZA VIRUS VAC: HCPCS | Performed by: INTERNAL MEDICINE

## 2022-09-15 PROCEDURE — 80053 COMPREHEN METABOLIC PANEL: CPT | Performed by: INTERNAL MEDICINE

## 2022-09-15 PROCEDURE — 80061 LIPID PANEL: CPT | Performed by: INTERNAL MEDICINE

## 2022-09-15 RX ORDER — LANSOPRAZOLE 30 MG/1
30 CAPSULE, DELAYED RELEASE ORAL DAILY
COMMUNITY
Start: 2022-09-15

## 2022-09-15 ASSESSMENT — ENCOUNTER SYMPTOMS
BREAST MASS: 0
COUGH: 0
SHORTNESS OF BREATH: 1
FREQUENCY: 0
CHILLS: 0
MYALGIAS: 1
DYSURIA: 0
DIZZINESS: 0
NERVOUS/ANXIOUS: 1
HEMATOCHEZIA: 0
HEADACHES: 0
SORE THROAT: 0
FEVER: 0
PARESTHESIAS: 0
ABDOMINAL PAIN: 0
JOINT SWELLING: 0
CONSTIPATION: 1
PALPITATIONS: 0
ARTHRALGIAS: 1
DIARRHEA: 0
HEMATURIA: 0
HEARTBURN: 1
EYE PAIN: 0
NAUSEA: 0
WEAKNESS: 0

## 2022-09-15 ASSESSMENT — PAIN SCALES - GENERAL: PAINLEVEL: NO PAIN (1)

## 2022-09-15 ASSESSMENT — ACTIVITIES OF DAILY LIVING (ADL): CURRENT_FUNCTION: NO ASSISTANCE NEEDED

## 2022-09-15 NOTE — PROGRESS NOTES
"SUBJECTIVE:   Carmen is a 76 year old who presents for Preventive Visit.      Patient has been advised of split billing requirements and indicates understanding: Yes  Are you in the first 12 months of your Medicare coverage?  No    Healthy Habits:     In general, how would you rate your overall health?  Good    Frequency of exercise:  6-7 days/week    Duration of exercise:  15-30 minutes    Do you usually eat at least 4 servings of fruit and vegetables a day, include whole grains    & fiber and avoid regularly eating high fat or \"junk\" foods?  No    Taking medications regularly:  Yes    Ability to successfully perform activities of daily living:  No assistance needed    Home Safety:  No safety concerns identified    Hearing Impairment:  No hearing concerns    In the past 6 months, have you been bothered by leaking of urine? Yes    In general, how would you rate your overall mental or emotional health?  Good      PHQ-2 Total Score: 1    Additional concerns today:  Yes    Do you feel safe in your environment? Yes    Have you ever done Advance Care Planning? (For example, a Health Directive, POLST, or a discussion with a medical provider or your loved ones about your wishes):        Fall risk  Fallen 2 or more times in the past year?: No  Any fall with injury in the past year?: No    Cognitive Screening   1) Repeat 3 items (Leader, Season, Table)    2) Clock draw: NORMAL  3) 3 item recall: Recalls 3 objects  Results: 3 items recalled: COGNITIVE IMPAIRMENT LESS LIKELY    Mini-CogTM Copyright S Lacey. Licensed by the author for use in [facility]; reprinted with permission (gaye@.[facility]). All rights reserved.      Do you have sleep apnea, excessive snoring or daytime drowsiness?: no    Reviewed and updated as needed this visit by clinical staff   Tobacco  Allergies  Meds                Reviewed and updated as needed this visit by Provider                   Social History     Tobacco Use     Smoking status: " Former Smoker     Packs/day: 1.00     Years: 26.00     Pack years: 26.00     Types: Cigarettes     Start date: 1962     Quit date: 1995     Years since quittin.7     Smokeless tobacco: Never Used   Substance Use Topics     Alcohol use: Not Currently     Comment: 26 yrs sober     If you drink alcohol do you typically have >3 drinks per day or >7 drinks per week? No    Alcohol Use 9/15/2022   Prescreen: >3 drinks/day or >7 drinks/week? -   Prescreen: >3 drinks/day or >7 drinks/week? No               Current providers sharing in care for this patient include:   Patient Care Team:  Maribell Castaneda MD as PCP - General (Internal Medicine)  Maribell Castaneda MD as Assigned PCP  Sarah Herr PA-C as Physician Assistant (Urology)  Sarah Herr PA-C as Assigned OBGYN Provider  Velasquez Gagnon DO as Assigned Musculoskeletal Provider    The following health maintenance items are reviewed in Epic and correct as of today:  Health Maintenance   Topic Date Due     ZOSTER IMMUNIZATION (3 of 3) 2022     ASTHMA CONTROL TEST  03/15/2023     MEDICARE ANNUAL WELLNESS VISIT  09/15/2023     ANNUAL REVIEW OF HM ORDERS  09/15/2023     ASTHMA ACTION PLAN  09/15/2023     FALL RISK ASSESSMENT  09/15/2023     ADVANCE CARE PLANNING  2026     LIPID  2026     DEXA  2030     DTAP/TDAP/TD IMMUNIZATION (3 - Td or Tdap) 2032     HEPATITIS C SCREENING  Completed     PHQ-2 (once per calendar year)  Completed     INFLUENZA VACCINE  Completed     Pneumococcal Vaccine: 65+ Years  Completed     COVID-19 Vaccine  Completed     IPV IMMUNIZATION  Aged Out     MENINGITIS IMMUNIZATION  Aged Out     HEPATITIS B IMMUNIZATION  Aged Out             Pertinent mammograms are reviewed under the imaging tab.    Review of Systems   Constitutional: Negative for chills and fever.   HENT: Negative for congestion, ear pain, hearing loss and sore throat.    Eyes: Positive for visual disturbance. Negative for pain.  "  Respiratory: Positive for shortness of breath. Negative for cough.    Cardiovascular: Negative for chest pain, palpitations and peripheral edema.   Gastrointestinal: Positive for constipation and heartburn. Negative for abdominal pain, diarrhea, hematochezia and nausea.   Breasts:  Negative for tenderness, breast mass and discharge.   Genitourinary: Positive for vaginal discharge. Negative for dysuria, frequency, genital sores, hematuria, pelvic pain, urgency and vaginal bleeding.   Musculoskeletal: Positive for arthralgias and myalgias. Negative for joint swelling.   Skin: Negative for rash.   Neurological: Negative for dizziness, weakness, headaches and paresthesias.   Psychiatric/Behavioral: Positive for mood changes. The patient is nervous/anxious.      Home Blood Pressure readings are better   Taking prevacid  Goes to eye doctor  Using premarin cream  She will be travelling to [place]  Not taking crestor yet  Above complaints in the review of system were discussed  Appears like nothing is acute and she is managing those  Like for heartburn she takes over-the-counter Prevacid  OBJECTIVE:   BP (!) 150/70   Pulse 55   Temp 97.4  F (36.3  C) (Temporal)   Resp 18   Ht 1.588 m (5' 2.5\")   Wt 76.7 kg (169 lb)   SpO2 98%   BMI 30.42 kg/m   Estimated body mass index is 30.42 kg/m  as calculated from the following:    Height as of this encounter: 1.588 m (5' 2.5\").    Weight as of this encounter: 76.7 kg (169 lb).  Physical Exam  GENERAL: healthy, alert and no distress  EYES: Eyes grossly normal to inspection, PERRL and conjunctivae and sclerae normal  HENT: ear canals and TM's normal, nose and mouth without ulcers or lesions  NECK: no adenopathy,   RESP: lungs clear to auscultation - no rales, rhonchi or wheezes  BREAST: normal without masses, tenderness or nipple discharge and no palpable axillary masses or adenopathy  CV: regular rate and rhythm, normal S1 S2, no S3 she has peripheral edema and peripheral " pulses strong  ABDOMEN: soft, nontender, no hepatosplenomegaly, no masses and bowel sounds normal  MS: Lots of arthritis changes  SKIN: no suspicious lesions or rashes  NEURO: mentation intact and speech normal  PSYCH: mentation appears normal, affect normal/bright      Very difficult to get to exam table and getting out       ASSESSMENT / PLAN:   Carmen was seen today for physical, imm/inj and imm/inj.    Diagnoses and all orders for this visit:    Encounter for Medicare annual wellness exam  Preventive health counseling was also done.    Screening for deficiency anemia  -     CBC with platelets; Future  -     CBC with platelets    Screening for thyroid disorder  -     TSH with free T4 reflex; Future  -     TSH with free T4 reflex    Osteopenia, unspecified location  -     DX Hip/Pelvis/Spine; Future  Discussed the importance of adequate calcium and vitamin D    Benign essential hypertension  Blood pressure was on higher side  She is on lisinopril and clonidine  Per patient at home it is under much better control  I think the walking from lobby to exam room plus  Getting to the table was difficult due to her back pain and pain in joints  That made her blood pressure go up  Advised to monitor blood pressure at home  Let me know if it is stays above 140/90    Medication management  -     Comprehensive metabolic panel; Future  -     Comprehensive metabolic panel    Hyperlipidemia, unspecified hyperlipidemia type  -     Lipid panel reflex to direct LDL Non-fasting; Future  -     Lipid panel reflex to direct LDL Non-fasting    Asymptomatic menopausal state  -     DX Hip/Pelvis/Spine; Future    Iron deficiency  -     Ferritin; Future  -     Ferritin  She had work-up done including endoscopy and colonoscopy  Those were discussed with the patient  Because of iron deficiency was most likely gastritis    Need for prophylactic vaccination and inoculation against influenza  -     INFLUENZA, QUAD, HIGH DOSE, PF, 65YR + (FLUZONE  "HD)  -     ADMIN INFLUENZA (For MEDICARE Patients ONLY) []    High priority for 2019-nCoV vaccine  -     COVID-19,PF,PFIZER BOOSTER BIVALENT 12+Yrs    Other orders  -     REVIEW OF HEALTH MAINTENANCE PROTOCOL ORDERS    Disclaimer: This note consists of symbols derived from keyboarding, dictation and/or voice recognition software. As a result, there may be errors in the script that have gone undetected. Please consider this when interpreting information found in this chart.      Patient has been advised of split billing requirements and indicates understanding: Yes    COUNSELING:  Reviewed preventive health counseling, as reflected in patient instructions       Regular exercise       Healthy diet/nutrition       Osteoporosis prevention/bone health    Estimated body mass index is 30.42 kg/m  as calculated from the following:    Height as of this encounter: 1.588 m (5' 2.5\").    Weight as of this encounter: 76.7 kg (169 lb).    Weight management plan: Discussed healthy diet and exercise guidelines    She reports that she quit smoking about 27 years ago. Her smoking use included cigarettes. She started smoking about 60 years ago. She has a 26.00 pack-year smoking history. She has never used smokeless tobacco.      Appropriate preventive services were discussed with this patient, including applicable screening as appropriate for cardiovascular disease, diabetes, osteopenia/osteoporosis, and glaucoma.  As appropriate for age/gender, discussed screening for colorectal cancer, prostate cancer, breast cancer, and cervical cancer. Checklist reviewing preventive services available has been given to the patient.    Reviewed patients plan of care and provided an AVS. The Basic Care Plan (routine screening as documented in Health Maintenance) for Carmen meets the Care Plan requirement. This Care Plan has been established and reviewed with the Patient.    Counseling Resources:  ATP IV Guidelines  Pooled Cohorts Equation " Calculator  Breast Cancer Risk Calculator  Breast Cancer: Medication to Reduce Risk  FRAX Risk Assessment  ICSI Preventive Guidelines  Dietary Guidelines for Americans, 2010  USDA's MyPlate  ASA Prophylaxis  Lung CA Screening    Maribell Castaneda MD  [facility]    Identified Health Risks:    The patient was counseled and encouraged to consider modifying their diet and eating habits. She was provided with information on recommended healthy diet options.  Information on urinary incontinence and treatment options given to patient.

## 2022-09-15 NOTE — PATIENT INSTRUCTIONS
Labs today  You are due for mammogram.  Please call the following number to make appointment :  [phone]  It is located in [address]  Labs today  You are due for mammogram and bone density  Please call the following number to make appointment :  [phone]  It is located in [address]    Monitor your blood pressure once a week  at home.  Bring those readings on your next visit.  Notify us if your blood pressure readings consistently stays greater than 140/90.     Follow up in 6 months.  Seek sooner medical attention if there is any worsening of symptoms or problems.     Patient Education   Personalized Prevention Plan  You are due for the preventive services outlined below.  Your care team is available to assist you in scheduling these services.  If you have already completed any of these items, please share that information with your care team to update in your medical record.  Health Maintenance Due   Topic Date Due    Asthma Action Plan - yearly  02/04/2022    Flu Vaccine (1) 09/01/2022    Zoster (Shingles) Vaccine (3 of 3) 09/02/2022       Understanding USDA MyPlate  The USDA has guidelines to help you make healthy food choices. These are called MyPlate. MyPlate shows the food groups that make up healthy meals using the image of a place setting. Before you eat, think about the healthiest choices for what to put on your plate or in your cup or bowl. To learn more about building a healthy plate, visit www.choosemyplate.gov.    The food groups  Fruits. Any fruit or 100% fruit juice counts as part of the Fruit Group. Fruits may be fresh, canned, frozen, or dried, and may be whole, cut-up, or pureed. Make 1/2 of your plate fruits and vegetables.  Vegetables. Any vegetable or 100% vegetable juice counts as a member of the Vegetable Group. Vegetables may be fresh, frozen, canned, or dried. They can be served raw or cooked and may be whole, cut-up, or mashed. Make 1/2 of your plate fruits and vegetables.  Grains.  All foods made from grains are part of the Grains Group. These include wheat, rice, oats, cornmeal, and barley. Grains are often used to make foods such as bread, pasta, oatmeal, cereal, tortillas, and grits. Grains should be no more than 1/4 of your plate. At least half of your grains should be whole grains.  Protein. This group includes meat, poultry, seafood, beans and peas, eggs, processed soy products (such as tofu), nuts (including nut butters), and seeds. Make protein choices no more than 1/4 of your plate. Meat and poultry choices should be lean or low fat.  Dairy. The Dairy Group includes all fluid milk products and foods made from milk that contain calcium, such as yogurt and cheese. (Foods that have little calcium, such as cream, butter, and cream cheese, are not part of this group.) Most dairy choices should be low-fat or fat-free.  Oils. Oils aren't a food group, but they do contain essential nutrients. However it's important to watch your intake of oils. These are fats that are liquid at room temperature. They include canola, corn, olive, soybean, vegetable, and sunflower oil. Foods that are mainly oil include mayonnaise, certain salad dressings, and soft margarines. You likely already get your daily oil allowance from the foods you eat.  Things to limit  Eating healthy also means limiting these things in your diet:     Salt (sodium). Many processed foods have a lot of sodium. To keep sodium intake down, eat fresh vegetables, meats, poultry, and seafood when possible. Purchase low-sodium, reduced-sodium, or no-salt-added food products at the store. And don't add salt to your meals at home. Instead, season them with herbs and spices such as dill, oregano, cumin, and paprika. Or try adding flavor with lemon or lime zest and juice.  Saturated fat. Saturated fats are most often found in animal products such as beef, pork, and chicken. They are often solid at room temperature, such as butter. To reduce your  saturated fat intake, choose leaner cuts of meat and poultry. And try healthier cooking methods such as grilling, broiling, roasting, or baking. For a simple lower-fat swap, use plain nonfat yogurt instead of mayonnaise when making potato salad or macaroni salad.  Added sugars. These are sugars added to foods. They are in foods such as ice cream, candy, soda, fruit drinks, sports drinks, energy drinks, cookies, pastries, jams, and syrups. Cut down on added sugars by sharing sweet treats with a family member or friend. You can also choose fruit for dessert, and drink water or other unsweetened beverages.     [employer] last reviewed this educational content on 6/1/2020 2000-2021 [employer]. All rights reserved. This information is not intended as a substitute for professional medical care. Always follow your healthcare professional's instructions.          Urinary Incontinence, Female (Adult)   Urinary incontinence means loss of bladder control. This problem affects many women, especially as they get older. If you have incontinence, you may be embarrassed to ask for help. But know that this problem can be treated.   Types of Incontinence  There are different types of incontinence. Two of the main types are described here. You can have more than one type.   Stress incontinence. With this type, urine leaks when pressure (stress) is put on the bladder. This may happen when you cough, sneeze, or laugh. Stress incontinence most often occurs because the pelvic floor muscles that support the bladder and urethra are weak. This can happen after pregnancy and vaginal childbirth or a hysterectomy. It can also be due to excess body weight or hormone changes.  Urge incontinence (also called overactive bladder). With this type, a sudden urge to urinate is felt often. This may happen even though there may not be much urine in the bladder. The need to urinate often during the night is common. Urge incontinence most  often occurs because of bladder spasms. This may be due to bladder irritation or infection. Damage to bladder nerves or pelvic muscles, constipation, and certain medicines can also lead to urge incontinence.  Treatment depends on the cause. Further evaluation is needed to find the type you have. This will likely include an exam and certain tests. Based on the results, you and your healthcare provider can then plan treatment. Until a diagnosis is made, the home care tips below can help ease symptoms.   Home care  Do pelvic floor muscle exercises, if they are prescribed. The pelvic floor muscles help support the bladder and urethra. Many women find that their symptoms improve when doing special exercises that strengthen these muscles. To do the exercises, contract the muscles you would use to stop your stream of urine. But do this when you re not urinating. Hold for 10 seconds, then relax. Repeat 10 to 20 times in a row, at least 3 times a day. Your healthcare provider may give you other instructions for how to do the exercises and how often.  Keep a bladder diary. This helps track how often and how much you urinate over a set period of time. Bring this diary with you to your next visit with the provider. The information can help your provider learn more about your bladder problem.  Lose weight, if advised to by your provider. Extra weight puts pressure on the bladder. Your provider can help you create a weight-loss plan that s right for you. This may include exercising more and making certain diet changes.  Don't have foods and drinks that may irritate the bladder. These can include alcohol and caffeinated drinks.  Quit smoking. Smoking and other tobacco use can lead to a long-term (chronic) cough that strains the pelvic floor muscles. Smoking may also damage the bladder and urethra. Talk with your provider about treatments or methods you can use to quit smoking.  If drinking large amounts of fluid makes you have  symptoms, you may be advised to limit your fluid intake. You may also be advised to drink most of your fluids during the day and to limit fluids at night.  If you re worried about urine leakage or accidents, you may wear absorbent pads to catch urine. Change the pads often. This helps reduce discomfort. It may also reduce the risk of skin or bladder infections.    Follow-up care  Follow up with your healthcare provider, or as directed. It may take some to find the right treatment for your problem. But healthy lifestyle changes can be made right away. These include such things as exercising on a regular basis, eating a healthy diet, losing weight (if needed), and quitting smoking. Your treatment plan may include special therapies or medicines. Certain procedures or surgery may also be options. Talk about any questions you have with your provider.   When to seek medical advice  Call the healthcare provider right away if any of these occur:  Fever of 100.4 F (38 C) or higher, or as directed by your provider  Bladder pain or fullness  Belly swelling  Nausea or vomiting  Back pain  Weakness, dizziness, or fainting  Alexandra last reviewed this educational content on 1/1/2020 2000-2021 [employer]. All rights reserved. This information is not intended as a substitute for professional medical care. Always follow your healthcare professional's instructions.

## 2022-09-16 ENCOUNTER — THERAPY VISIT (OUTPATIENT)
Dept: PHYSICAL THERAPY | Facility: CLINIC | Age: 77
End: 2022-09-16
Payer: MEDICARE

## 2022-09-16 DIAGNOSIS — M25.561 BILATERAL KNEE PAIN: Primary | ICD-10-CM

## 2022-09-16 DIAGNOSIS — M25.562 BILATERAL KNEE PAIN: Primary | ICD-10-CM

## 2022-09-16 LAB
ALBUMIN SERPL-MCNC: 4 G/DL (ref 3.4–5)
ALP SERPL-CCNC: 63 U/L (ref 40–150)
ALT SERPL W P-5'-P-CCNC: 20 U/L (ref 0–50)
ANION GAP SERPL CALCULATED.3IONS-SCNC: 5 MMOL/L (ref 3–14)
AST SERPL W P-5'-P-CCNC: 16 U/L (ref 0–45)
BILIRUB SERPL-MCNC: 0.4 MG/DL (ref 0.2–1.3)
BUN SERPL-MCNC: 20 MG/DL (ref 7–30)
CALCIUM SERPL-MCNC: 9.6 MG/DL (ref 8.5–10.1)
CHLORIDE BLD-SCNC: 103 MMOL/L (ref 94–109)
CHOLEST SERPL-MCNC: 256 MG/DL
CO2 SERPL-SCNC: 28 MMOL/L (ref 20–32)
CREAT SERPL-MCNC: 0.75 MG/DL (ref 0.52–1.04)
FASTING STATUS PATIENT QL REPORTED: NO
FERRITIN SERPL-MCNC: 23 NG/ML (ref 8–252)
GFR SERPL CREATININE-BSD FRML MDRD: 81 ML/MIN/1.73M2
GLUCOSE BLD-MCNC: 95 MG/DL (ref 70–99)
HDLC SERPL-MCNC: 43 MG/DL
LDLC SERPL CALC-MCNC: 182 MG/DL
NONHDLC SERPL-MCNC: 213 MG/DL
POTASSIUM BLD-SCNC: 4.2 MMOL/L (ref 3.4–5.3)
PROT SERPL-MCNC: 7.8 G/DL (ref 6.8–8.8)
SODIUM SERPL-SCNC: 136 MMOL/L (ref 133–144)
TRIGL SERPL-MCNC: 155 MG/DL
TSH SERPL DL<=0.005 MIU/L-ACNC: 2.17 MU/L (ref 0.4–4)

## 2022-09-16 PROCEDURE — 97140 MANUAL THERAPY 1/> REGIONS: CPT | Mod: GP | Performed by: PHYSICAL THERAPIST

## 2022-09-16 PROCEDURE — 97110 THERAPEUTIC EXERCISES: CPT | Mod: GP | Performed by: PHYSICAL THERAPIST

## 2022-09-16 NOTE — RESULT ENCOUNTER NOTE
Mary Morales,    This is to inform you regarding your test result.    CBC result which includes white count Hemoglobin and  Platelet Counts is normal.   Other test results are pending.          Sincerely,      Dr.Nasima Tonya MD,FACP

## 2022-09-18 NOTE — RESULT ENCOUNTER NOTE
Mary Morales,    This is to inform you regarding your test result.    Your total cholesterol is elevated.  The triglycerides are high. Lowering  the amount of sugar ,alcohol and sweets in the diet helps to control this.Exercise and weight loss helps.  HDL which is called good cholesterol is low.  Your LDL which is called bad cholesterol is elevated.  Eat low cholesterol low fat  diet and do regular physical activity. Avoid high sugar containing food.  You should be on cholesterol lowering medication .  If you agree then let me know and I can send the script of crestor to pharmacy.  TSH which is thyroid hormone is normal.  Ferritin which is iron stores in the body is low.  We want Ferritin level greater than 50  Take OTC Ferrous Sulfate 325 mg po once daily or every other day if you tolerate.  Take with vit C as vit C helps to absorb iron.  Iron can make you constipated so take stool softener.  Recheck ferritin in 4 months  The testing of your blood sugar, kidney function, liver function and electrolytes was normal.  CBC result which includes white count Hemoglobin and  Platelet Counts is normal.           Sincerely,      Dr.Nasima Tonya MD,FACP

## 2022-09-23 ENCOUNTER — THERAPY VISIT (OUTPATIENT)
Dept: PHYSICAL THERAPY | Facility: CLINIC | Age: 77
End: 2022-09-23
Payer: MEDICARE

## 2022-09-23 DIAGNOSIS — M25.562 BILATERAL KNEE PAIN: Primary | ICD-10-CM

## 2022-09-23 DIAGNOSIS — M25.561 BILATERAL KNEE PAIN: Primary | ICD-10-CM

## 2022-09-23 PROCEDURE — 97140 MANUAL THERAPY 1/> REGIONS: CPT | Mod: GP | Performed by: PHYSICAL THERAPIST

## 2022-09-23 PROCEDURE — 97110 THERAPEUTIC EXERCISES: CPT | Mod: GP | Performed by: PHYSICAL THERAPIST

## 2022-09-26 ENCOUNTER — OFFICE VISIT (OUTPATIENT)
Dept: URGENT CARE | Facility: URGENT CARE | Age: 77
End: 2022-09-26
Payer: MEDICARE

## 2022-09-26 VITALS
SYSTOLIC BLOOD PRESSURE: 216 MMHG | WEIGHT: 168 LBS | DIASTOLIC BLOOD PRESSURE: 84 MMHG | BODY MASS INDEX: 30.24 KG/M2 | OXYGEN SATURATION: 97 % | TEMPERATURE: 99.1 F | HEART RATE: 81 BPM

## 2022-09-26 DIAGNOSIS — R30.0 DYSURIA: Primary | ICD-10-CM

## 2022-09-26 LAB
BACTERIA #/AREA URNS HPF: ABNORMAL /HPF
RBC #/AREA URNS AUTO: ABNORMAL /HPF
SQUAMOUS #/AREA URNS AUTO: ABNORMAL /LPF
WBC #/AREA URNS AUTO: ABNORMAL /HPF

## 2022-09-26 PROCEDURE — 81015 MICROSCOPIC EXAM OF URINE: CPT

## 2022-09-26 PROCEDURE — 99212 OFFICE O/P EST SF 10 MIN: CPT

## 2022-09-27 NOTE — PROGRESS NOTES
Assessment & Plan     Dysuria    - Urine Microscopic    Reassured micro shows no WBCs.  Recommend increased fluids and close Follow-up if no change or new or worsening sx.    Shelby Harrell MD   Urgent Care Provider  [facility] URGENT [facility]ARE MILO Morales is a 76 year old, presenting for the following health issues:  Urgent Care and UTI (URGENCY, FEELING SOMETHING IN ARMS WHEN FINISHING URINATING. )      HPI     Presents with increased urgency and dysuria.  No fever or flank pain.  Uses Premarin cream for atrophic vaginitis but has not been taking it frequently recently.      Review of Systems   Constitutional, HEENT, cardiovascular, pulmonary, GI, , musculoskeletal, neuro, skin, endocrine and psych systems are negative, except as otherwise noted.      Objective    BP (!) 216/84   Pulse 81   Temp 99.1  F (37.3  C)   Wt 76.2 kg (168 lb)   SpO2 97%   BMI 30.24 kg/m    Body mass index is 30.24 kg/m .  Physical Exam   GENERAL: healthy, alert and no distress  EYES: Eyes grossly normal to inspection, PERRL and conjunctivae and sclerae normal  ABDOMEN: soft, nontender  MS: no gross musculoskeletal defects noted, no edema    Results for orders placed or performed in visit on 09/26/22 (from the past 24 hour(s))   Urine Microscopic   Result Value Ref Range    Bacteria Urine None Seen None Seen /HPF    RBC Urine 2-5 (A) 0-2 /HPF /HPF    WBC Urine 0-5 0-5 /HPF /HPF    Squamous Epithelials Urine Few (A) None Seen /LPF    Narrative    Urine Culture not indicated

## 2022-09-30 ENCOUNTER — THERAPY VISIT (OUTPATIENT)
Dept: PHYSICAL THERAPY | Facility: CLINIC | Age: 77
End: 2022-09-30
Payer: MEDICARE

## 2022-09-30 DIAGNOSIS — M25.561 BILATERAL KNEE PAIN: Primary | ICD-10-CM

## 2022-09-30 DIAGNOSIS — M25.562 BILATERAL KNEE PAIN: Primary | ICD-10-CM

## 2022-09-30 PROCEDURE — 97140 MANUAL THERAPY 1/> REGIONS: CPT | Mod: GP | Performed by: PHYSICAL THERAPIST

## 2022-09-30 PROCEDURE — 97110 THERAPEUTIC EXERCISES: CPT | Mod: GP | Performed by: PHYSICAL THERAPIST

## 2022-10-14 ENCOUNTER — THERAPY VISIT (OUTPATIENT)
Dept: PHYSICAL THERAPY | Facility: CLINIC | Age: 77
End: 2022-10-14
Payer: MEDICARE

## 2022-10-14 DIAGNOSIS — M25.562 BILATERAL KNEE PAIN: Primary | ICD-10-CM

## 2022-10-14 DIAGNOSIS — M25.561 BILATERAL KNEE PAIN: Primary | ICD-10-CM

## 2022-10-14 PROCEDURE — 97110 THERAPEUTIC EXERCISES: CPT | Mod: GP | Performed by: PHYSICAL THERAPIST

## 2022-10-14 PROCEDURE — 97140 MANUAL THERAPY 1/> REGIONS: CPT | Mod: GP | Performed by: PHYSICAL THERAPIST

## 2022-10-14 NOTE — PROGRESS NOTES
DISCHARGE REPORT    Progress reporting period is from 8/30/2022 to 10/14/2022.   Carmen has been seen in PT 6 times for treatment of right greater than left knee pain.  Treatment has included manual therapy and knee and hip strengthening exercises.    SUBJECTIVE    Subjective: Carmen reports occasional minimal anterior right knee pain, usually first thing in the morning and at the end of the day.  She is able to walk 4 blocks without difficulty and feels she will tolerated upcoming trip to [place] well.      Current Pain level:  (1-2/10).      Initial Pain level: 6/10.   Changes in function:  Yes (See Goal flowsheet attached for changes in current functional level)  Adverse reaction to treatment or activity: None    OBJECTIVE  Changes noted in objective findings:    Objective: Bilateral knee ROM is WNL.  Carmen is able to perform 10 knee bends without pain but has right anterior knee pain after 4 sit to stand reps.     ASSESSMENT/PLAN  Updated problem list and treatment plan:   STG/LTGs have been met or progress has been made towards goals:  Yes (See Goal flow sheet completed today.)  Assessment of Progress: The patient's condition is improving.  Self Management Plans:  Patient has been instructed in a home treatment program.    Carmen continues to require the following intervention to meet STG and LTG's:  PT intervention is no longer required to meet STG/LTG.    Recommendations:  This patient is ready to be discharged from therapy and continue their home treatment program.    Please refer to the daily flowsheet for treatment today, total treatment time and time spent performing 1:1 timed codes.

## 2022-11-01 ENCOUNTER — MYC MEDICAL ADVICE (OUTPATIENT)
Dept: FAMILY MEDICINE | Facility: CLINIC | Age: 77
End: 2022-11-01

## 2022-11-01 NOTE — TELEPHONE ENCOUNTER
Sent patient [employer] message including how to set up a treatment appointment and when to call 911.    Jacquelyn Dodson RN  [facility]    
Self

## 2022-11-14 NOTE — LETTER
August 3, 2020      Carmen Bonner  [address]  [facility] [address]        Mary Morales,     I spoke to you on phone but sending you a result note also:     Your urine culture is not growing any significant organism.     TSH which is thyroid hormone is normal.     Your total cholesterol is elevated.    -HDL which is called good cholesterol is normal.    -Your LDL which is called bad cholesterol is elevated.    -Eat low cholesterol low fat  diet and do regular physical activity. Avoid high sugar   containing food.   Sodium level is low and we should discontinue your hydrochlorothiazide.    -Monitor Blood Pressure daily and let me know if Blood Pressure is higher than 140/90     Your hemoglobin is low and you are anemic.    -Ferritin which is iron stores in the body is low. We want Ferritin level greater than       -Take OTC Ferrous Sulfate 325 mg po 3 times a week if you tolerate.     -Take with vit C as vit C helps to absorb iron.     -Iron can make you constipated so take stool softener.     -Recheck ferritin in 4 months     I am glad that your GI symptoms are improving.     Recheck sodium in 2 weeks     Keep your follow up appointment as scheduled -- we usually do endoscopy and colonoscopy for work up of anemia, but you do not want to do that. Let me know if you change your mind.       Resulted Orders   TSH with free T4 reflex   Result Value Ref Range    TSH 1.54 0.40 - 4.00 mU/L   Comprehensive metabolic panel   Result Value Ref Range    Sodium 126 (L) 133 - 144 mmol/L    Potassium 4.2 3.4 - 5.3 mmol/L    Chloride 92 (L) 94 - 109 mmol/L    Carbon Dioxide 25 20 - 32 mmol/L    Anion Gap 9 3 - 14 mmol/L    Glucose 97 70 - 99 mg/dL      Comment:      Non Fasting    Urea Nitrogen 22 7 - 30 mg/dL    Creatinine 0.88 0.52 - 1.04 mg/dL    GFR Estimate 64 >60 mL/min/[1.73_m2]      Comment:      Non  GFR Calc  Starting 12/18/2018, serum creatinine based estimated GFR (eGFR) will  be   calculated using the Chronic Kidney Disease Epidemiology Collaboration   (CKD-EPI) equation.      GFR Estimate If Black 74 >60 mL/min/[1.73_m2]      Comment:       GFR Calc  Starting 12/18/2018, serum creatinine based estimated GFR (eGFR) will be   calculated using the Chronic Kidney Disease Epidemiology Collaboration   (CKD-EPI) equation.      Calcium 9.2 8.5 - 10.1 mg/dL    Bilirubin Total 0.4 0.2 - 1.3 mg/dL    Albumin 4.0 3.4 - 5.0 g/dL    Protein Total 8.2 6.8 - 8.8 g/dL    Alkaline Phosphatase 67 40 - 150 U/L    ALT 19 0 - 50 U/L    AST 15 0 - 45 U/L   CBC with platelets   Result Value Ref Range    WBC 5.7 4.0 - 11.0 10e9/L    RBC Count 3.85 3.8 - 5.2 10e12/L    Hemoglobin 11.3 (L) 11.7 - 15.7 g/dL    Hematocrit 32.7 (L) 35.0 - 47.0 %    MCV 85 78 - 100 fl    MCH 29.4 26.5 - 33.0 pg    MCHC 34.6 31.5 - 36.5 g/dL    RDW 12.3 10.0 - 15.0 %    Platelet Count 218 150 - 450 10e9/L   Ferritin   Result Value Ref Range    Ferritin 45 8 - 252 ng/mL   Lipid panel reflex to direct LDL Non-fasting   Result Value Ref Range    Cholesterol 230 (H) <200 mg/dL      Comment:      Desirable:       <200 mg/dl    Triglycerides 121 <150 mg/dL      Comment:      Non Fasting    HDL Cholesterol 57 >49 mg/dL    LDL Cholesterol Calculated 149 (H) <100 mg/dL      Comment:      Above desirable:  100-129 mg/dl  Borderline High:  130-159 mg/dL  High:             160-189 mg/dL  Very high:       >189 mg/dl      Non HDL Cholesterol 173 (H) <130 mg/dL      Comment:      Above Desirable:  130-159 mg/dl  Borderline high:  160-189 mg/dl  High:             190-219 mg/dl  Very high:       >219 mg/dl     UA with Microscopic reflex to Culture   Result Value Ref Range    Color Urine Yellow     Appearance Urine Clear     Glucose Urine Negative NEG^Negative mg/dL    Bilirubin Urine Negative NEG^Negative    Ketones Urine Negative NEG^Negative mg/dL    Specific Gravity Urine 1.020 1.003 - 1.035    pH Urine 6.5 5.0 - 7.0 pH    Protein  Albumin Urine Negative NEG^Negative mg/dL    Urobilinogen Urine 0.2 0.2 - 1.0 EU/dL    Nitrite Urine Negative NEG^Negative    Blood Urine Trace (A) NEG^Negative    Leukocyte Esterase Urine Small (A) NEG^Negative    Source Midstream Urine     WBC Urine 5-10 (A) OTO5^0 - 5 /HPF    RBC Urine O - 2 OTO2^O - 2 /HPF    Squamous Epithelial /LPF Urine Moderate (A) FEW^Few /LPF    Bacteria Urine Moderate (A) NEG^Negative /HPF   Urine Culture Aerobic Bacterial   Result Value Ref Range    Specimen Description Midstream Urine     Culture Micro       10,000 to 50,000 colonies/mL  mixed urogenital amos  Susceptibility testing not routinely done           If you have any questions or concerns, please call the clinic at the number listed above.       Sincerely,       Dr. Maribell Castaneda MD, FACP                No

## 2022-12-12 ENCOUNTER — HOSPITAL ENCOUNTER (OUTPATIENT)
Dept: BONE DENSITY | Facility: CLINIC | Age: 77
Discharge: HOME | End: 2022-12-12
Attending: INTERNAL MEDICINE
Payer: MEDICARE

## 2022-12-12 ENCOUNTER — HOSPITAL ENCOUNTER (OUTPATIENT)
Dept: MAMMOGRAPHY | Facility: CLINIC | Age: 77
Discharge: HOME | End: 2022-12-12
Attending: INTERNAL MEDICINE
Payer: MEDICARE

## 2022-12-12 DIAGNOSIS — Z78.0 ASYMPTOMATIC MENOPAUSAL STATE: ICD-10-CM

## 2022-12-12 DIAGNOSIS — Z12.31 VISIT FOR SCREENING MAMMOGRAM: ICD-10-CM

## 2022-12-12 DIAGNOSIS — M85.80 OSTEOPENIA, UNSPECIFIED LOCATION: ICD-10-CM

## 2022-12-12 PROCEDURE — 77080 DXA BONE DENSITY AXIAL: CPT

## 2022-12-12 PROCEDURE — 77067 SCR MAMMO BI INCL CAD: CPT

## 2023-01-19 ENCOUNTER — VIRTUAL VISIT (OUTPATIENT)
Dept: FAMILY MEDICINE | Facility: CLINIC | Age: 78
End: 2023-01-19
Payer: MEDICARE

## 2023-01-19 DIAGNOSIS — M85.80 OSTEOPENIA WITH HIGH RISK OF FRACTURE: Primary | ICD-10-CM

## 2023-01-19 DIAGNOSIS — M25.551 HIP PAIN, RIGHT: ICD-10-CM

## 2023-01-19 PROCEDURE — 99441 PR PHYSICIAN TELEPHONE EVALUATION 5-10 MIN: CPT | Mod: 95 | Performed by: INTERNAL MEDICINE

## 2023-01-19 NOTE — PATIENT INSTRUCTIONS
Make appointment with orthopedics for your hip pain    Make sure you take adequate amount of calcium and vitamin D  Weightbearing exercises like walking  You have not tolerated Fosamax, Boniva and Reclast  Will try ordering Prolia and see if it gets approved  Keep your follow-up appointment in March as a scheduled  Seek sooner medical attention if there is any worsening of symptoms or problems.

## 2023-01-19 NOTE — PROGRESS NOTES
Carmen is a 77 year old who is being evaluated via a billable telephone visit.      What phone number would you like to be contacted at? [phone]  How would you like to obtain your AVS? Marguerite  Distant Location (provider location):  On-site    Assessment & Plan     Carmen was seen today for follow up.    Diagnoses and all orders for this visit:    Osteopenia with high risk of fracture  -     denosumab (PROLIA) injection 60 mg  Patient mentioned that she has history of osteopenia  She was given Fosamax in 1990  She did not tolerate as it gave her severe stomach pain  She took it only for few months  She tried Boniva and that did not agree with her either  She also tried something else  She also tried IV Reclast  Did not tolerate that either  At this point her bone density is decreasing  Reviewed the bone density result  Discussed the importance of taking adequate calcium and vitamin D and weightbearing exercises  Advised for Prolia  Order is placed  To see if it gets approved by the insurance otherwise we will have to refer her to specialist    Hip pain, right  Complaining of intermittent right hip pain  History of knee pain which has improved and she has seen orthopedics  I have referred her to orthopedics for evaluation of hip pain      [id number]}     See Patient Instructions  Patient Instructions   Make appointment with orthopedics for your hip pain    Make sure you take adequate amount of calcium and vitamin D  Weightbearing exercises like walking  You have not tolerated Fosamax, Boniva and Reclast  Will try ordering Prolia and see if it gets approved  Keep your follow-up appointment in March as a scheduled  Seek sooner medical attention if there is any worsening of symptoms or problems.        Return in about 2 months (around 3/20/2023) for medication follow up.    Maribell Castaneda MD  [facility]    Nehemiah Morales is a 77 year old, presenting for the following health issues:  Follow Up  (Osteopenia )      History of Present Illness       Reason for visit:  Osteopenia test results/possible meds    She eats 2-3 servings of fruits and vegetables daily.She consumes 0 sweetened beverage(s) daily.She exercises with enough effort to increase her heart rate 9 or less minutes per day.  She exercises with enough effort to increase her heart rate 5 days per week.   She is taking medications regularly.       Patient has tried Fosamax, Boniva and Reclast and did not tolerate in the past  She is complaining of right hip pain intermittently      Review of Systems   Constitutional, HEENT, cardiovascular, pulmonary, GI, , musculoskeletal, neuro, skin, endocrine and psych systems are negative, except as otherwise noted.      Objective    Vitals - Patient Reported  Systolic (Patient Reported): 135  Diastolic (Patient Reported): 53  Pain Score: No Pain (0)      Vitals:  No vitals were obtained today due to virtual visit.    Physical Exam   healthy, alert and no distress  PSYCH: Alert and oriented times 3; coherent speech, normal   rate and volume, able to articulate logical thoughts, able   to abstract reason, no tangential thoughts, no hallucinations   or delusions  Her affect is normal  RESP: No cough, no audible wheezing, able to talk in full sentences  Remainder of exam unable to be completed due to telephone visits                Phone call duration: 9 minutes

## 2023-02-08 ENCOUNTER — OFFICE VISIT (OUTPATIENT)
Dept: ORTHOPEDICS | Facility: CLINIC | Age: 78
End: 2023-02-08
Payer: MEDICARE

## 2023-02-08 ENCOUNTER — ANCILLARY PROCEDURE (OUTPATIENT)
Dept: GENERAL RADIOLOGY | Facility: CLINIC | Age: 78
End: 2023-02-08
Attending: FAMILY MEDICINE
Payer: MEDICARE

## 2023-02-08 ENCOUNTER — TELEPHONE (OUTPATIENT)
Dept: FAMILY MEDICINE | Facility: CLINIC | Age: 78
End: 2023-02-08

## 2023-02-08 VITALS — HEIGHT: 62 IN | BODY MASS INDEX: 32.76 KG/M2 | WEIGHT: 178 LBS

## 2023-02-08 DIAGNOSIS — M85.80 OSTEOPENIA WITH HIGH RISK OF FRACTURE: Primary | ICD-10-CM

## 2023-02-08 DIAGNOSIS — M51.369 LUMBAR DEGENERATIVE DISC DISEASE: ICD-10-CM

## 2023-02-08 DIAGNOSIS — M25.551 CHRONIC PAIN OF RIGHT HIP: Primary | ICD-10-CM

## 2023-02-08 DIAGNOSIS — M25.551 HIP PAIN, RIGHT: ICD-10-CM

## 2023-02-08 DIAGNOSIS — G89.29 CHRONIC PAIN OF RIGHT HIP: Primary | ICD-10-CM

## 2023-02-08 PROCEDURE — 72100 X-RAY EXAM L-S SPINE 2/3 VWS: CPT | Mod: TC | Performed by: RADIOLOGY

## 2023-02-08 PROCEDURE — 99215 OFFICE O/P EST HI 40 MIN: CPT | Performed by: FAMILY MEDICINE

## 2023-02-08 PROCEDURE — 73502 X-RAY EXAM HIP UNI 2-3 VIEWS: CPT | Mod: TC | Performed by: INTERNAL MEDICINE

## 2023-02-08 ASSESSMENT — PAIN SCALES - GENERAL: PAINLEVEL: NO PAIN (1)

## 2023-02-08 NOTE — LETTER
2/8/2023         RE: Carmen Bonner  [address]  [facility] [address]        Dear Colleague,    Thank you for referring your patient, Carmen Bonner, to the [facility] [place]. Please see a copy of my visit note below.    CHIEF COMPLAINT:  No chief complaint on file.    HISTORY OF PRESENT ILLNESS  Ms. Bonner is a pleasant 77 year old year old female who presents to clinic today with right hip pain.  Carmen explains that right hip pain began during an 8 hour flight to [place] back in October- tailbone pain intermittent. Lives in [facility]. Hip pain intermittent- posterior hip, if she sits too long or is walking long distances.  Decreased strength on right side for steps. LBP off and on.     Onset: gradual, worsening  Location: right hip  Quality:  aching and dull  Duration: 5 months.  Severity: 4/10 at worst  Timing:intermittent episodes   Modifying factors:  resting and non-use makes it better, movement and use makes it worse  Associated signs & symptoms: pain  Previous similar pain:   Treatments to date: Tylenol, heat, muscle relaxer    Additional history: She wishes to travel again this summer and has a wedding to attend in June.  She is hoping that we can get control of this pain before then.    Review of Systems:  A 10-point review of systems was obtained and is negative except for as noted in the HPI.     MEDICAL HISTORY  Patient Active Problem List   Diagnosis     BMI 31     CARDIOVASCULAR SCREENING; LDL GOAL LESS THAN 130     HTN, goal below 140/90     Intermittent asthma, uncomplicated     GERD (gastroesophageal reflux disease)     Insomnia due to other mental disorder     Arthritis     Lumbar spondylosis     Upper back pain     Hypertension     Pain in thoracic spine     Stool incontinence     Advanced directives, counseling/discussion     Allergic rhinitis     Hyperlipidemia, unspecified hyperlipidemia type     History of paroxysmal supraventricular  tachycardia     Generalized anxiety disorder     Anemia, unspecified type     Joint stiffness     Acute midline thoracic back pain     Physical deconditioning     Vitamin D deficiency     Benign essential hypertension     Primary insomnia     Osteopenia, unspecified location     Iron deficiency     Recurrent urinary tract infection       Current Outpatient Medications   Medication Sig Dispense Refill     acetaminophen (TYLENOL) 500 MG tablet Take 500-1,000 mg by mouth every 6 hours as needed for mild pain       albuterol (PROAIR HFA) 108 (90 Base) MCG/ACT inhaler Inhale 2 puffs into the lungs every 4 hours as needed for shortness of breath / dyspnea or wheezing 8.5 Inhaler 11     cholecalciferol 25 MCG (1000 UT) CHEW Take 1 chew tab by mouth daily       cloNIDine (CATAPRES) 0.2 MG tablet Take 1 tablet (0.2 mg) by mouth 2 times daily 180 tablet 1     conjugated estrogens (PREMARIN) 0.625 MG/GM vaginal cream Place 1 g vaginally twice a week 30 g 1     fluconazole (DIFLUCAN) 150 MG tablet Take 1 tablet (150 mg) by mouth once as needed 3 tablet 0     LANsoprazole (PREVACID) 30 MG DR capsule Take 1 capsule (30 mg) by mouth daily       lisinopril (ZESTRIL) 40 MG tablet TAKE 1 TABLET (40 MG) BY MOUTH DAILY FOR BLOOD PRESSURE 90 tablet 3     montelukast (SINGULAIR) 10 MG tablet Take 1 tablet (10 mg) by mouth At Bedtime 90 tablet 3     nystatin (MYCOSTATIN) 587236 UNIT/GM external powder Apply topically 2 times daily 60 g 0     QUEtiapine (SEROQUEL) 100 MG tablet Take 1.5 tablets (150 mg) by mouth At Bedtime for sleep 135 tablet 3     rosuvastatin (CRESTOR) 5 MG tablet Take 1 tablet (5 mg) by mouth daily for cholestrol 90 tablet 3     tiZANidine (ZANAFLEX) 2 MG tablet TAKE 1-2 TABLETS BY MOUTH DAILY AS NEEDED FOR MUSCLE SPASMS 180 tablet 1     triamcinolone (KENALOG) 0.1 % cream Apply topically 2 times daily 45 g 1     valACYclovir (VALTREX) 1000 mg tablet Take 1 tablet (1,000 mg) by mouth as needed Herpes Outbreak 12 tablet  "1       Allergies   Allergen Reactions     Boniva [Ibandronic Acid]      Intolerance any stomach upset     Fosamax [Alendronic Acid]      Stomach upset     Hctz [Hydrochlorothiazide] Other (See Comments)     Low sodium     Lexapro [Escitalopram] Nausea     Omeprazole Diarrhea     Sulfa Drugs Itching     Venlafaxine Nausea     Amlodipine Swelling     swelling of foot     Reclast [Zoledronic Acid]      Intolerance body aches       Family History   Problem Relation Age of Onset     Cancer Mother      Osteoporosis Mother      Cerebrovascular Disease Father      Alzheimer Disease Father      Genitourinary Problems Maternal Grandmother      Heart Disease Paternal Grandfather      Osteoporosis Sister      Arthritis Sister      Breast Cancer Paternal Grandmother      Colon Cancer No family hx of        Additional medical/Social/Surgical histories reviewed in [facility] and updated as appropriate.       PHYSICAL EXAM  Ht 1.575 m (5' 2\")   Wt 80.7 kg (178 lb)   BMI 32.56 kg/m      General  - normal appearance, in no obvious distress  CV  - normal peripheral perfusion  Pulm  - normal respiratory pattern, non-labored  Musculoskeletal - lumbar spine  - stance: normal gait without limp, no obvious leg length discrepancy, normal heel and toe walk  - inspection: normal bone and joint alignment, no obvious scoliosis  - palpation: no paravertebral or bony tenderness  - ROM: flexion exacerbates pain, normal extension, sidebending, rotation  - strength: lower extremities 5/5 in all planes  - special tests:  (-) straight leg raise  (-) slump test  Musculoskeletal - Right hip  - inspection: no swelling of anterolateral hip,  normal bone and joint alignment, no obvious deformity  - palpation: no lateral or anterior hip tenderness   - ROM: normal flexion, extension, IR, ER, abduction, adduction, not painful  - strength: 5/5 in all planes  - special tests:  (-) MISBAH  (-) FADIR  no pain with axial femoral load  Neuro  - No lower extremity " sensory deficits, grossly normal coordination, normal muscle tone  Skin  - no ecchymosis, erythema, warmth, or induration, no obvious rash  Psych  - interactive, appropriate, normal mood and affect    IMAGING : XR lumbar 2 views and hip 2 views. Final results and radiologist's interpretation, available in the [facility] health record. Images were reviewed with the patient/family members in the office today. My personal interpretation of the performed imaging is moderate to severe L5-S1 degenerative changes. No significant hip osteoarthritis.      ASSESSMENT & PLAN  Ms. Bonner is a 77 year old year old female who presents to clinic today with acute right sided buttock pain after prolonged trip flying to [place] 1+ years ago.     Suspected lumbar radiculitis affecting right hip posteriorly rather than muscular or intraarticular hip pathology. XR hip very reassuring.      Diagnosis: Chronic pain of right hip, Lumbar degenerative disc disease    Discussed although I cannot be certain that this pain is emanating from her lumbar spine, is most likely.  Consideration for MRI versus empiric treatment with physical therapy reviewed with Carmen.  She wishes to pursue physical therapy and had very good results as well as rapport with Kaylee Ball to see her back.  If they are not making significant progress, we then may consider a lumbar and +/- hip MRI.      Continue with heat/ice, Tylenol analgesic, standing between periods of prolonged sitting.  Follow-up 6 weeks.    It was a pleasure seeing Carmen today.    45 minutes on date of the encounter doing chart review, history and examination, independent imaging review, documentation, and additional activities noted above.      Velasquez Gagnon DO, [facility]  Primary Care Sports Medicine      Again, thank you for allowing me to participate in the care of your patient.        Sincerely,        Velasquez Gagnon DO

## 2023-02-08 NOTE — TELEPHONE ENCOUNTER
----- Message from Brionna Mejias sent at 2/8/2023 10:08 AM CST -----  Regarding: Prolia  Mary Garrett,    We checked her Rx benefit for coverage for Prolia and patient would have a copay of $764.01 for one syringe.    Thank you,    Brionna Fenton      Patient does not meet criteria for Medicare Part B coverage for Prolia injection due to dx of Osteopenia and not meeting secondary diagnosis code criteria.    Gerry Pena, CMA on 2/8/2023 at 10:30 AM

## 2023-02-08 NOTE — PATIENT INSTRUCTIONS
Thank you for choosing [facility]    [facility] CLINIC LOCATIONS  [place] [address] [facility], [address]th [place]   [place], [place], [address] [place] [address]   [phone]       APPOINTMENTS: [phone]    CARE QUESTIONS: [phone],    BILLING QUESTIONS: [phone]    FAX NUMBER: [phone]        Follow up: as needed    1. Chronic pain of right hip    2. Hip pain, right      Physical Therapy orders have been placed with [facility] (formally [place] [facility])  You can call [phone] to schedule at your convenience.

## 2023-02-09 NOTE — TELEPHONE ENCOUNTER
Sent message to financial team to see if we can do a PA for Prolia given allergies to bisphosphonates.  Will await response.    Mayra ShankarD, [facility]  Medication Therapy Management Provider  Pager: [phone]

## 2023-02-10 NOTE — TELEPHONE ENCOUNTER
"Per Brionna Fenton - \"Medicare for insurance and they do not cover Osteopenia unless it's related to breast cancer therapy/treatment\" even with prior authorization.    I'm not sure there's much else we can do to get the price down for her unfortunately.    Anali Hamilton, PharmD, [facility]  Medication Therapy Management Provider  Pager: [phone]         "

## 2023-02-21 ENCOUNTER — THERAPY VISIT (OUTPATIENT)
Dept: PHYSICAL THERAPY | Facility: CLINIC | Age: 78
End: 2023-02-21
Attending: FAMILY MEDICINE
Payer: MEDICARE

## 2023-02-21 DIAGNOSIS — M79.18 RIGHT BUTTOCK PAIN: ICD-10-CM

## 2023-02-21 DIAGNOSIS — G89.29 CHRONIC PAIN OF RIGHT HIP: ICD-10-CM

## 2023-02-21 DIAGNOSIS — M25.551 CHRONIC PAIN OF RIGHT HIP: ICD-10-CM

## 2023-02-21 PROCEDURE — 97161 PT EVAL LOW COMPLEX 20 MIN: CPT | Mod: GP | Performed by: PHYSICAL THERAPIST

## 2023-02-21 PROCEDURE — 97110 THERAPEUTIC EXERCISES: CPT | Mod: GP | Performed by: PHYSICAL THERAPIST

## 2023-02-21 ASSESSMENT — ACTIVITIES OF DAILY LIVING (ADL)
WALKING_INITIALLY: SLIGHT DIFFICULTY
HOS_ADL_ITEM_SCORE_TOTAL: 47
WALKING_UP_STEEP_HILLS: SLIGHT DIFFICULTY
RECREATIONAL_ACTIVITIES: NO DIFFICULTY AT ALL
PUTTING_ON_SOCKS_AND_SHOES: NO DIFFICULTY AT ALL
GETTING_INTO_AND_OUT_OF_AN_AVERAGE_CAR: SLIGHT DIFFICULTY
TWISTING/PIVOTING_ON_INVOLVED_LEG: SLIGHT DIFFICULTY
HOS_ADL_SCORE(%): 78.33
LIGHT_TO_MODERATE_WORK: SLIGHT DIFFICULTY
HOS_ADL_HIGHEST_POTENTIAL_SCORE: 60
WALKING_APPROXIMATELY_10_MINUTES: NO DIFFICULTY AT ALL
STANDING_FOR_15_MINUTES: SLIGHT DIFFICULTY
GETTING_INTO_AND_OUT_OF_A_BATHTUB: NO DIFFICULTY AT ALL
HOS_ADL_COUNT: 15
GOING_UP_1_FLIGHT_OF_STAIRS: MODERATE DIFFICULTY
GOING_DOWN_1_FLIGHT_OF_STAIRS: MODERATE DIFFICULTY
SITTING_FOR_15_MINUTES: NO DIFFICULTY AT ALL
STEPPING_UP_AND_DOWN_CURBS: SLIGHT DIFFICULTY
ROLLING_OVER_IN_BED: NO DIFFICULTY AT ALL
WALKING_15_MINUTES_OR_GREATER: SLIGHT DIFFICULTY
WALKING_DOWN_STEEP_HILLS: SLIGHT DIFFICULTY

## 2023-02-21 NOTE — PROGRESS NOTES
Physical Therapy Initial Evaluation  Subjective:  Patient presents to PT for treatment of low back/right hip pain with referral dated 2/8/2023.  She relates onset of symptoms to  flght from [place] in October of 2022.  Patient complains of right buttock pain.  Symptoms are worse with sitting on a hard surface.  Sitting with a cushion is OK.  Patient tolerates walking briskly for 10 min well.  Patient had tailbone pain on [place] flight but currently does not have any significant low back pain.  Patient states she has felt unsteady on stairs for a few years.  Recent imaging as follows:    Hip Xray:                                                                  IMPRESSION: No acute fracture or malalignment. There is normal hip  joint spacing bilaterally. Mild degenerative changes of the sacroiliac  joints. Moderate to severe degenerative changes of the lower lumbar  spine. Osteopenia. Atherosclerosis.    Lumbar Xray:  FINDINGS: There are five lumbar type vertebral bodies. Anterior  spondylolisthesis at L4-L5 by 3 mm with otherwise normal alignment   Diffuse osteopenia. No compression fracture. Moderate disc space  narrowing at L5-S1 with mild marginal osteophyte. The disc spaces are  otherwise maintained. Moderate facet arthropathy at L4-L5 with  otherwise mild facet arthropathy. The visualized sacrum and pelvis are  unremarkable. Diffuse calcified plaque within the abdominal aorta.  Surgical clips right upper quadrant.           The history is provided by the patient.   Patient Health History  Carmen Bonner being seen for back and hip pain.       Problem occurred: getting old!   Pain is reported as 1/10 on pain scale.  General health as reported by patient is good.  Pertinent medical history includes: chemical dependency, depression, high blood pressure, overweight and osteoarthritis.     Medical allergies: latex.   Surgeries include:  Other. Other surgery history details: hysterectomy, gall bladder removal.     Current medications:  High blood pressure medication, muscle relaxants and sleep medication.    Current occupation is retired.                                       Objective:  Standing Alignment:        Lumbar:  Lordosis decr      Knee deviations alignment: pt stands with right knee more extended than left.      Gait:    Gait Type:  Normal   Assistive Devices:  None                 Lumbar/SI Evaluation  ROM:    AROM Lumbar:   Flexion:          Fingertips to distal LE without complaint  Ext:                    Mod loss without complaint   Side Bend:        Left:  No complaint    Right:  No complaint  Rotation:           Left:     Right:   Side Glide:        Left:     Right:           Lumbar Myotomes:  normal            Lumbar DTR's:  normal          Neural Tension/Mobility:      Left side:Slump  negative.   Right side:   SLR w/DF positive.  Right side:   Slump  negative.   Lumbar Palpation:  normal                                              Hip Evaluation  HIP AROM:  AROM:   Left Hip:     Normal    Right Hip:                      Hip Strength:        Abduction:  Left: 5-/5    -   Pain:Right: 4-/5   ++   Pain:                  Hip Special Testing:   Special tests hip not assessed: lateral right hip complaint with single leg stance.    Right hip negative for the following special tests:  Kajal or Fadir/Labrum                 General     ROS    Assessment/Plan:    Patient is a 77 year old female with lumbar and right side hip complaints.    Patient has the following significant findings with corresponding treatment plan.                Diagnosis 1:  Right buttock pain - R/O lumbar radiculitis vs greater trochanteric pain syndrome  Pain -  self management, education and home program  Decreased ROM/flexibility - manual therapy and therapeutic exercise  Decreased strength - therapeutic exercise and therapeutic activities  Decreased function - therapeutic activities    Therapy Evaluation Codes:   1) History comprised  of:   Personal factors that impact the plan of care:      None.    Comorbidity factors that impact the plan of care are:      None.     Medications impacting care: None.  2) Examination of Body Systems comprised of:   Body structures and functions that impact the plan of care:      Hip and Lumbar spine.   Activity limitations that impact the plan of care are:      Stairs, Standing and Walking.  3) Clinical presentation characteristics are:   Stable/Uncomplicated.  4) Decision-Making    Low complexity using standardized patient assessment instrument and/or measureable assessment of functional outcome.  Cumulative Therapy Evaluation is: Low complexity.    Previous and current functional limitations:  (See Goal Flow Sheet for this information)    Short term and Long term goals: (See Goal Flow Sheet for this information)     Communication ability:  Patient appears to be able to clearly communicate and understand verbal and written communication and follow directions correctly.  Treatment Explanation - The following has been discussed with the patient:   RX ordered/plan of care  Anticipated outcomes  Possible risks and side effects  This patient would benefit from PT intervention to resume normal activities.   Rehab potential is good.    Frequency:  1 X week, once daily  Duration:  for 4 weeks tapering to 2x/mo for 8 weeks  Discharge Plan:  Achieve all LTG.  Independent in home treatment program.  Reach maximal therapeutic benefit.    Please refer to the daily flowsheet for treatment today, total treatment time and time spent performing 1:1 timed codes.

## 2023-02-21 NOTE — PROGRESS NOTES
[facility]    OUTPATIENT Physical Therapy ORTHOPEDIC EVALUATION  PLAN OF TREATMENT FOR OUTPATIENT REHABILITATION  (COMPLETE FOR INITIAL CLAIMS ONLY)  Patient's Last Name, First Name, M.I.  YOB: 1945  Carmen Bonner    Provider s Name:  [facility]   Medical Record No.  [id number]   Start of Care Date:  02/21/23   Onset Date:    (referral 2/8/2023)   Treatment Diagnosis:  right buttock pain Medical Diagnosis:     Chronic pain of right hip  Right buttock pain       Goals:     02/21/23 0500   Body Part   Goals listed below are for R buttock pain   Goal #1   Goal #1 standing   Previous Functional Level No restrictions   Current Functional Level Minutes patient can stand   Performance level 10   STG Target Performance Minutes patient will be able to stand   Performance level 20   Rationale for safe household ambulation;for meal preparation   Due date 04/04/23   LTG Target Performance Minutes patient will be able to stand   Performance Level 30   Rationale for housekeeping tasks such as vacuuming, bed making, mowing;for meal preparation;for safe community ambulation   Due date 05/16/23           Therapy Frequency:  1x/week for 4 weeks tapering to 2x/mo for 8 weeks  Predicted Duration of Therapy Intervention:  12 weeks    Kaylee Khan, PT                 I CERTIFY THE NEED FOR THESE SERVICES FURNISHED UNDER        THIS PLAN OF TREATMENT AND WHILE UNDER MY CARE     (Physician attestation of this document indicates review and certification of the therapy plan).                     Certification Date From:  02/21/23   Certification Date To:  05/16/23    Referring Provider:  Velasquez Gagnon    Initial Assessment        See Epic Evaluation SOC Date: 02/21/23

## 2023-02-22 NOTE — TELEPHONE ENCOUNTER
"Called and spoke to the patient. Relayed message from the provider. Per virtual visit note from  1/19/23, \"o see if it gets approved by the insurance otherwise we will have to refer her to specialist.\"    Patient is interested in speaking to a specialist. Will route to PCP for review.    Antonella Vidal RN    "

## 2023-02-23 NOTE — CONFIDENTIAL NOTE
Referral to endocrinology is done   Please provide the phone number to schedule appointment   [phone].  They are booked out for few months  It is ok to take next available opening   Continue adequate calcium and vit D  Dr.Nasima Tonya MD

## 2023-02-24 NOTE — CONFIDENTIAL NOTE
Patient Contact    Attempt # 1    Was call answered?  No.  Left a non detailed message on voicemail with information to call back or review message in chart     Also sent MyChart message     Luh PAGE, Triage RN  [facility]

## 2023-02-28 ENCOUNTER — THERAPY VISIT (OUTPATIENT)
Dept: PHYSICAL THERAPY | Facility: CLINIC | Age: 78
End: 2023-02-28
Attending: FAMILY MEDICINE
Payer: MEDICARE

## 2023-02-28 DIAGNOSIS — M79.18 RIGHT BUTTOCK PAIN: Primary | ICD-10-CM

## 2023-02-28 PROCEDURE — 97140 MANUAL THERAPY 1/> REGIONS: CPT | Mod: GP | Performed by: PHYSICAL THERAPIST

## 2023-02-28 PROCEDURE — 97110 THERAPEUTIC EXERCISES: CPT | Mod: GP | Performed by: PHYSICAL THERAPIST

## 2023-02-28 NOTE — TELEPHONE ENCOUNTER
Sep 20, 2023  3:30 PM  NEW ENDOCRINE with Denisse aClderon MD  [facility] [place] ([facility] ) [phone]        Pt has visit scheduled with endocrinology     Closing encounter    Qiana Bone RN  [facility]

## 2023-03-13 ASSESSMENT — ASTHMA QUESTIONNAIRES
ACT_TOTALSCORE: 23
QUESTION_4 LAST FOUR WEEKS HOW OFTEN HAVE YOU USED YOUR RESCUE INHALER OR NEBULIZER MEDICATION (SUCH AS ALBUTEROL): NOT AT ALL
QUESTION_1 LAST FOUR WEEKS HOW MUCH OF THE TIME DID YOUR ASTHMA KEEP YOU FROM GETTING AS MUCH DONE AT WORK, SCHOOL OR AT HOME: NONE OF THE TIME
QUESTION_3 LAST FOUR WEEKS HOW OFTEN DID YOUR ASTHMA SYMPTOMS (WHEEZING, COUGHING, SHORTNESS OF BREATH, CHEST TIGHTNESS OR PAIN) WAKE YOU UP AT NIGHT OR EARLIER THAN USUAL IN THE MORNING: NOT AT ALL
ACT_TOTALSCORE: 23
QUESTION_5 LAST FOUR WEEKS HOW WOULD YOU RATE YOUR ASTHMA CONTROL: WELL CONTROLLED
QUESTION_2 LAST FOUR WEEKS HOW OFTEN HAVE YOU HAD SHORTNESS OF BREATH: ONCE OR TWICE A WEEK

## 2023-03-14 ENCOUNTER — THERAPY VISIT (OUTPATIENT)
Dept: PHYSICAL THERAPY | Facility: CLINIC | Age: 78
End: 2023-03-14
Attending: FAMILY MEDICINE
Payer: MEDICARE

## 2023-03-14 DIAGNOSIS — M79.18 RIGHT BUTTOCK PAIN: Primary | ICD-10-CM

## 2023-03-14 PROCEDURE — 97110 THERAPEUTIC EXERCISES: CPT | Mod: GP | Performed by: PHYSICAL THERAPIST

## 2023-03-14 PROCEDURE — 97140 MANUAL THERAPY 1/> REGIONS: CPT | Mod: GP | Performed by: PHYSICAL THERAPIST

## 2023-03-17 ENCOUNTER — LAB (OUTPATIENT)
Dept: LAB | Facility: CLINIC | Age: 78
End: 2023-03-17
Payer: MEDICARE

## 2023-03-17 DIAGNOSIS — Z79.899 MEDICATION MANAGEMENT: ICD-10-CM

## 2023-03-17 DIAGNOSIS — E78.5 HYPERLIPIDEMIA, UNSPECIFIED HYPERLIPIDEMIA TYPE: ICD-10-CM

## 2023-03-17 DIAGNOSIS — E83.52 HYPERCALCEMIA: ICD-10-CM

## 2023-03-17 DIAGNOSIS — E61.1 IRON DEFICIENCY: ICD-10-CM

## 2023-03-17 LAB
ALBUMIN SERPL BCG-MCNC: 4.3 G/DL (ref 3.5–5.2)
ALP SERPL-CCNC: 62 U/L (ref 35–104)
ALT SERPL W P-5'-P-CCNC: 14 U/L (ref 10–35)
ANION GAP SERPL CALCULATED.3IONS-SCNC: 9 MMOL/L (ref 7–15)
AST SERPL W P-5'-P-CCNC: 20 U/L (ref 10–35)
BILIRUB SERPL-MCNC: 0.3 MG/DL
BUN SERPL-MCNC: 16.9 MG/DL (ref 8–23)
CALCIUM SERPL-MCNC: 12.5 MG/DL (ref 8.8–10.2)
CHLORIDE SERPL-SCNC: 106 MMOL/L (ref 98–107)
CHOLEST SERPL-MCNC: 153 MG/DL
CREAT SERPL-MCNC: 0.76 MG/DL (ref 0.51–0.95)
DEPRECATED HCO3 PLAS-SCNC: 26 MMOL/L (ref 22–29)
FERRITIN SERPL-MCNC: 39 NG/ML (ref 11–328)
GFR SERPL CREATININE-BSD FRML MDRD: 79 ML/MIN/1.73M2
GLUCOSE SERPL-MCNC: 126 MG/DL (ref 70–99)
HDLC SERPL-MCNC: 54 MG/DL
LDLC SERPL CALC-MCNC: 74 MG/DL
NONHDLC SERPL-MCNC: 99 MG/DL
POTASSIUM SERPL-SCNC: 4.6 MMOL/L (ref 3.4–5.3)
PROT SERPL-MCNC: 7.2 G/DL (ref 6.4–8.3)
SODIUM SERPL-SCNC: 141 MMOL/L (ref 136–145)
TRIGL SERPL-MCNC: 125 MG/DL

## 2023-03-17 PROCEDURE — 80053 COMPREHEN METABOLIC PANEL: CPT

## 2023-03-17 PROCEDURE — 36415 COLL VENOUS BLD VENIPUNCTURE: CPT

## 2023-03-17 PROCEDURE — 80061 LIPID PANEL: CPT

## 2023-03-17 PROCEDURE — 82306 VITAMIN D 25 HYDROXY: CPT

## 2023-03-17 PROCEDURE — 82728 ASSAY OF FERRITIN: CPT

## 2023-03-19 DIAGNOSIS — E83.52 HYPERCALCEMIA: Primary | ICD-10-CM

## 2023-03-19 NOTE — RESULT ENCOUNTER NOTE
Corienoelle Morales    This is to inform you regarding your test result.    The testing of your kidney function, liver function and electrolytes was satisfactory   Your calcium level is slightly elevated  We will recheck calcium in 2 to 3 months  Glucose which is your blood sugar is slightly elevated.  Avoid high sugar containing food.  Your total cholesterol is normal.  HDL which is called good cholesterol is normal.  Your LDL cholesterol is normal.  This is often call bad cholesterol and high levels increase the risk for heart attacks and strokes.  Your triglycerides are normal.  There is a great improvement in your cholesterol  Please continue current treatment  Ferritin which is iron stores in the body is low  But numbers have been improving slowly  Continue present treatment          Sincerely,      Dr.Nasima Tonya MD,FACP

## 2023-03-20 ENCOUNTER — OFFICE VISIT (OUTPATIENT)
Dept: FAMILY MEDICINE | Facility: CLINIC | Age: 78
End: 2023-03-20
Payer: MEDICARE

## 2023-03-20 VITALS
DIASTOLIC BLOOD PRESSURE: 78 MMHG | TEMPERATURE: 98.1 F | HEART RATE: 58 BPM | BODY MASS INDEX: 33.42 KG/M2 | SYSTOLIC BLOOD PRESSURE: 160 MMHG | RESPIRATION RATE: 16 BRPM | HEIGHT: 62 IN | OXYGEN SATURATION: 96 % | WEIGHT: 181.6 LBS

## 2023-03-20 DIAGNOSIS — J45.20 INTERMITTENT ASTHMA, UNCOMPLICATED: ICD-10-CM

## 2023-03-20 DIAGNOSIS — E78.5 HYPERLIPIDEMIA, UNSPECIFIED HYPERLIPIDEMIA TYPE: ICD-10-CM

## 2023-03-20 DIAGNOSIS — M85.80 OSTEOPENIA WITH HIGH RISK OF FRACTURE: ICD-10-CM

## 2023-03-20 DIAGNOSIS — I10 ESSENTIAL HYPERTENSION: Primary | ICD-10-CM

## 2023-03-20 DIAGNOSIS — F51.01 PRIMARY INSOMNIA: ICD-10-CM

## 2023-03-20 DIAGNOSIS — M54.9 UPPER BACK PAIN: ICD-10-CM

## 2023-03-20 DIAGNOSIS — R73.9 ELEVATED BLOOD SUGAR: ICD-10-CM

## 2023-03-20 DIAGNOSIS — R31.29 MICROSCOPIC HEMATURIA: ICD-10-CM

## 2023-03-20 LAB — DEPRECATED CALCIDIOL+CALCIFEROL SERPL-MC: 28 UG/L (ref 20–75)

## 2023-03-20 PROCEDURE — 99214 OFFICE O/P EST MOD 30 MIN: CPT | Performed by: INTERNAL MEDICINE

## 2023-03-20 RX ORDER — ROSUVASTATIN CALCIUM 5 MG/1
5 TABLET, COATED ORAL DAILY
Qty: 90 TABLET | Refills: 3 | Status: SHIPPED | OUTPATIENT
Start: 2023-03-20 | End: 2024-01-29

## 2023-03-20 RX ORDER — QUETIAPINE FUMARATE 100 MG/1
150 TABLET, FILM COATED ORAL AT BEDTIME
Qty: 135 TABLET | Refills: 3 | Status: SHIPPED | OUTPATIENT
Start: 2023-03-20 | End: 2024-01-29

## 2023-03-20 RX ORDER — ALBUTEROL SULFATE 90 UG/1
2 AEROSOL, METERED RESPIRATORY (INHALATION) EVERY 4 HOURS PRN
Qty: 18 G | Refills: 1 | Status: SHIPPED | OUTPATIENT
Start: 2023-03-20

## 2023-03-20 RX ORDER — TIZANIDINE 2 MG/1
TABLET ORAL
Qty: 180 TABLET | Refills: 1 | Status: SHIPPED | OUTPATIENT
Start: 2023-03-20 | End: 2024-07-30

## 2023-03-20 RX ORDER — CARVEDILOL 6.25 MG/1
6.25 TABLET ORAL 2 TIMES DAILY WITH MEALS
Qty: 60 TABLET | Refills: 3 | Status: SHIPPED | OUTPATIENT
Start: 2023-03-20 | End: 2023-04-13

## 2023-03-20 ASSESSMENT — PAIN SCALES - GENERAL: PAINLEVEL: NO PAIN (0)

## 2023-03-20 NOTE — RESULT ENCOUNTER NOTE
Mary Morales    This is to inform you regarding your test result.    Your vitamin D level is on the low end of normal but it has improved compared to before  Continue taking vitamin D 2000 international unit daily    Sincerely,      Dr.Nasima Tonya MD,FACP

## 2023-03-20 NOTE — PATIENT INSTRUCTIONS
Monitor your blood pressure once a week  at home.  Bring those readings on your next visit.  Notify us if your blood pressure readings consistently stays greater than 140/90.     Labs today    Please call radiology by dialing  [phone] to schedule your kidney ultrasound     Add coreg( carvedilol 6.25 mg twice a day for Blood Pressure     Follow up in 2 months   Seek sooner medical attention if there is any worsening of symptoms or problems.

## 2023-03-20 NOTE — PROGRESS NOTES
Carmen was seen today for follow up.    Diagnoses and all orders for this visit:    Essential hypertension  -     US Renal Complete w Arterial Duplex; Future  -     carvedilol (COREG) 6.25 MG tablet; Take 1 tablet (6.25 mg) by mouth 2 times daily (with meals) For Blood Pressure    Patient monitors her blood pressure at home.   The readings are reportedly around 120s in the morning after taking her blood pressure medications.   During the evening, the readings are around 140-150s.  She is on clonidine 0.2 mg and lisinopril 40 mg.  Blood pressure on arrival elevated at 171/79.  Rechecked blood pressure at  170/84 and 160/78    Since her blood pressure is consistently staying high, I discussed about adding carvedilol. Provided information about the medication. Patient agrees to the plan.   She cannot tolerate amlodipine because of leg swelling and cannot take hydrochlorothiazide either because of her sodium     Kidney US was recommended since her blood pressure is staying on the higher end. Patient agrees to the plan. She will make an appointment at her convenience.     Patient is started on carvedilol 6.25 mg BID. Continue taking clonidine and lisinopril.   Elevated blood sugar  -     Hemoglobin A1c; Future    Advised patient a low sugar containing diet     Hyperlipidemia, unspecified hyperlipidemia type  -     rosuvastatin (CRESTOR) 5 MG tablet; Take 1 tablet (5 mg) by mouth daily for cholestrol    Primary insomnia  -     QUEtiapine (SEROQUEL) 100 MG tablet; Take 1.5 tablets (150 mg) by mouth At Bedtime for sleep    Upper back pain  -     tiZANidine (ZANAFLEX) 2 MG tablet; TAKE 1-2 TABLETS BY MOUTH DAILY AS NEEDED FOR MUSCLE SPASMS    Intermittent asthma, uncomplicated  -     albuterol (PROAIR HFA) 108 (90 Base) MCG/ACT inhaler; Inhale 2 puffs into the lungs every 4 hours as needed for shortness of breath or wheezing    Microscopic hematuria  -     UA with Microscopic reflex to Culture - lab collect; Future    Some  "blood in urine in previous lab  Chronic and idiopathic per patient. She underwent hysteroscopy in the past and it was normal.     Osteopenia with high risk of fracture   She has an appointment with endocrinology in 09/2023.    Calcium elevated at 12.5 on 03/17/2023. Will monitor.     Nehemiah Morales is a 77 year old, presenting for the following health issues:  Follow Up      History of Present Illness       Hyperlipidemia:  She presents for follow up of hyperlipidemia.  She is taking medication to lower cholesterol. She is not having myalgia or other side effects to statin medications.    She eats 2-3 servings of fruits and vegetables daily.She consumes 0 sweetened beverage(s) daily.She exercises with enough effort to increase her heart rate 10 to 19 minutes per day.  She exercises with enough effort to increase her heart rate 5 days per week.   She is taking medications regularly.       Review of Systems   Constitutional, HEENT, cardiovascular, pulmonary, GI, , musculoskeletal, neuro, skin, endocrine and psych systems are negative, except as otherwise noted.      Objective    BP (!) 160/78 (BP Location: Right arm, Patient Position: Sitting)   Pulse 58   Temp 98.1  F (36.7  C) (Oral)   Resp 16   Ht 1.575 m (5' 2.01\")   Wt 82.4 kg (181 lb 9.6 oz)   SpO2 96%   BMI 33.21 kg/m    Body mass index is 33.21 kg/m .  Physical Exam   She is very nice and pleasant.  She is comfortable and not in any kind of distress.  She is fully alert awake oriented.    Labs pending.     This document serves as a record of the services and decisions personally performed and made by Dr. Castaneda. It was created on her behalf by Mami Plasencia, a trained medical scribe. The creation of this document is based the provider's statements to the medical scribe.              "

## 2023-03-24 ENCOUNTER — THERAPY VISIT (OUTPATIENT)
Dept: PHYSICAL THERAPY | Facility: CLINIC | Age: 78
End: 2023-03-24
Payer: MEDICARE

## 2023-03-24 DIAGNOSIS — M79.18 RIGHT BUTTOCK PAIN: Primary | ICD-10-CM

## 2023-03-24 PROCEDURE — 97110 THERAPEUTIC EXERCISES: CPT | Mod: GP | Performed by: PHYSICAL THERAPIST

## 2023-03-24 PROCEDURE — 97140 MANUAL THERAPY 1/> REGIONS: CPT | Mod: GP | Performed by: PHYSICAL THERAPIST

## 2023-03-30 ENCOUNTER — HOSPITAL ENCOUNTER (OUTPATIENT)
Dept: ULTRASOUND IMAGING | Facility: CLINIC | Age: 78
Discharge: HOME | End: 2023-03-30
Attending: INTERNAL MEDICINE | Admitting: INTERNAL MEDICINE
Payer: MEDICARE

## 2023-03-30 DIAGNOSIS — I10 ESSENTIAL HYPERTENSION: ICD-10-CM

## 2023-03-30 PROCEDURE — 93975 VASCULAR STUDY: CPT

## 2023-04-18 ENCOUNTER — THERAPY VISIT (OUTPATIENT)
Dept: PHYSICAL THERAPY | Facility: CLINIC | Age: 78
End: 2023-04-18
Payer: MEDICARE

## 2023-04-18 DIAGNOSIS — M79.18 RIGHT BUTTOCK PAIN: Primary | ICD-10-CM

## 2023-04-18 PROCEDURE — 97140 MANUAL THERAPY 1/> REGIONS: CPT | Mod: GP | Performed by: PHYSICAL THERAPIST

## 2023-04-18 PROCEDURE — 97110 THERAPEUTIC EXERCISES: CPT | Mod: GP | Performed by: PHYSICAL THERAPIST

## 2023-04-18 NOTE — PROGRESS NOTES
DISCHARGE REPORT    Progress reporting period is from 2/21/2023 to 4/18/2023. Carmen has been seen in PT 5 times for treatment of right buttock pain. Treatment has included manual therapy and hip, knee and core strengthening.      SUBJECTIVE   Subjective: right buttock and hip continue to feel much better.  Chief complaint today is weakness and pain at knees.  Patient was previously seen in PT for this in 2022.     Current Pain level:  (1/10 at hip/buttock).      Initial Pain level:  (1-5/10).   Changes in function:  Yes (See Goal flowsheet attached for changes in current functional level)  Adverse reaction to treatment or activity: None    OBJECTIVE  Changes noted in objective findings:    Objective: Trunk movements:  extension min loss with central LB complaint.  SB bilat sx free.  FIS - fingertips to mid distal LE without complaint.  Unable to balance in singe leg stance on right, able to maintain SLS on left 5 sec.  Patient is able to go sit to stand without using arms but limited to 3 reps due to bilateral knee discomfort.  Patient can ascend stairs in reciprical pattern but with heavy reliance on railing.  She descends one stair at a time.     ASSESSMENT/PLAN    STG/LTGs have been met or progress has been made towards goals:  Yes (See Goal flow sheet completed today.)  Assessment of Progress: The patient's condition is improving.  Self Management Plans:  Patient is independent in a home treatment program.    Carmen continues to require the following intervention to meet STG and LTG's:  PT intervention is no longer required to meet STG/LTG.    Recommendations:  This patient is ready to be discharged from therapy and continue their home treatment program.  Reviewed previous PT knee HEP today.    Please refer to the daily flowsheet for treatment today, total treatment time and time spent performing 1:1 timed codes.

## 2023-05-23 ENCOUNTER — OFFICE VISIT (OUTPATIENT)
Dept: FAMILY MEDICINE | Facility: CLINIC | Age: 78
End: 2023-05-23
Payer: MEDICARE

## 2023-05-23 VITALS
SYSTOLIC BLOOD PRESSURE: 160 MMHG | BODY MASS INDEX: 33.66 KG/M2 | HEIGHT: 62 IN | HEART RATE: 50 BPM | WEIGHT: 182.9 LBS | DIASTOLIC BLOOD PRESSURE: 84 MMHG | RESPIRATION RATE: 16 BRPM | TEMPERATURE: 97.9 F | OXYGEN SATURATION: 97 %

## 2023-05-23 DIAGNOSIS — M85.80 OSTEOPENIA WITH HIGH RISK OF FRACTURE: ICD-10-CM

## 2023-05-23 DIAGNOSIS — R73.09 ELEVATED GLUCOSE: ICD-10-CM

## 2023-05-23 DIAGNOSIS — I1A.0 RESISTANT HYPERTENSION: Primary | ICD-10-CM

## 2023-05-23 DIAGNOSIS — F51.01 PRIMARY INSOMNIA: ICD-10-CM

## 2023-05-23 DIAGNOSIS — E61.1 IRON DEFICIENCY: ICD-10-CM

## 2023-05-23 DIAGNOSIS — E83.52 HYPERCALCEMIA: ICD-10-CM

## 2023-05-23 DIAGNOSIS — Z23 HIGH PRIORITY FOR 2019-NCOV VACCINE: ICD-10-CM

## 2023-05-23 DIAGNOSIS — I10 ESSENTIAL HYPERTENSION: ICD-10-CM

## 2023-05-23 DIAGNOSIS — R31.29 MICROSCOPIC HEMATURIA: ICD-10-CM

## 2023-05-23 LAB
ANION GAP SERPL CALCULATED.3IONS-SCNC: 10 MMOL/L (ref 7–15)
BUN SERPL-MCNC: 18 MG/DL (ref 8–23)
CALCIUM SERPL-MCNC: 9.4 MG/DL (ref 8.8–10.2)
CHLORIDE SERPL-SCNC: 104 MMOL/L (ref 98–107)
CREAT SERPL-MCNC: 0.81 MG/DL (ref 0.51–0.95)
DEPRECATED HCO3 PLAS-SCNC: 25 MMOL/L (ref 22–29)
FERRITIN SERPL-MCNC: 63 NG/ML (ref 11–328)
GFR SERPL CREATININE-BSD FRML MDRD: 73 ML/MIN/1.73M2
GLUCOSE SERPL-MCNC: 103 MG/DL (ref 70–99)
HBA1C MFR BLD: 6.1 % (ref 0–5.6)
POTASSIUM SERPL-SCNC: 4.4 MMOL/L (ref 3.4–5.3)
PTH-INTACT SERPL-MCNC: 36 PG/ML (ref 15–65)
SODIUM SERPL-SCNC: 139 MMOL/L (ref 136–145)

## 2023-05-23 PROCEDURE — 99214 OFFICE O/P EST MOD 30 MIN: CPT | Mod: 25 | Performed by: INTERNAL MEDICINE

## 2023-05-23 PROCEDURE — 83036 HEMOGLOBIN GLYCOSYLATED A1C: CPT | Performed by: INTERNAL MEDICINE

## 2023-05-23 PROCEDURE — 36415 COLL VENOUS BLD VENIPUNCTURE: CPT | Performed by: INTERNAL MEDICINE

## 2023-05-23 PROCEDURE — 82728 ASSAY OF FERRITIN: CPT | Performed by: INTERNAL MEDICINE

## 2023-05-23 PROCEDURE — 83970 ASSAY OF PARATHORMONE: CPT | Performed by: INTERNAL MEDICINE

## 2023-05-23 PROCEDURE — 0124A COVID-19 BIVALENT 12+ (PFIZER): CPT | Performed by: INTERNAL MEDICINE

## 2023-05-23 PROCEDURE — 84244 ASSAY OF RENIN: CPT | Mod: 90 | Performed by: INTERNAL MEDICINE

## 2023-05-23 PROCEDURE — 91312 COVID-19 BIVALENT 12+ (PFIZER): CPT | Performed by: INTERNAL MEDICINE

## 2023-05-23 PROCEDURE — 80048 BASIC METABOLIC PNL TOTAL CA: CPT | Performed by: INTERNAL MEDICINE

## 2023-05-23 PROCEDURE — 99000 SPECIMEN HANDLING OFFICE-LAB: CPT | Performed by: INTERNAL MEDICINE

## 2023-05-23 PROCEDURE — 82088 ASSAY OF ALDOSTERONE: CPT | Performed by: INTERNAL MEDICINE

## 2023-05-23 RX ORDER — CLONIDINE HYDROCHLORIDE 0.2 MG/1
0.2 TABLET ORAL 2 TIMES DAILY
Qty: 180 TABLET | Refills: 1 | Status: SHIPPED | OUTPATIENT
Start: 2023-05-23 | End: 2023-10-04

## 2023-05-23 RX ORDER — CARVEDILOL 12.5 MG/1
12.5 TABLET ORAL 2 TIMES DAILY WITH MEALS
Qty: 180 TABLET | Refills: 1 | Status: SHIPPED | OUTPATIENT
Start: 2023-05-23 | End: 2023-10-04

## 2023-05-23 ASSESSMENT — PAIN SCALES - GENERAL: PAINLEVEL: NO PAIN (0)

## 2023-05-23 NOTE — PROGRESS NOTES
Assessment & Plan     Carmen was seen today for hypertension and imm/inj.    Diagnoses and all orders for this visit:    Resistant hypertension  -     Adult Nephrology  Referral; Future  Her blood pressure fluctuates a lot  She is on clonidine as well as maximum dose of lisinopril  We cannot go up on clonidine as she gets tired with that  She is on carvedilol as well  I increase the dose of carvedilol from 6.25-12.5 twice a day  She had renal ultrasound which did not show any renal artery stenosis  I referred her to nephrology for further evaluation and treatment  She has been monitoring her blood pressure at home and her blood pressure fluctuates it can be normal and it can be very high    Essential hypertension  -     Aldosterone; Future  -     Renin activity; Future  -     Aldosterone Renin Ratio; Future  -     Potassium; Future  -     Basic metabolic panel  (Ca, Cl, CO2, Creat, Gluc, K, Na, BUN); Future  -     carvedilol (COREG) 12.5 MG tablet; Take 1 tablet (12.5 mg) by mouth 2 times daily (with meals) For Blood Pressure  -     cloNIDine (CATAPRES) 0.2 MG tablet; Take 1 tablet (0.2 mg) by mouth 2 times daily for Blood Pressure  -     Cancel: Aldosterone  -     Cancel: Renin activity  -     Aldosterone Renin Ratio  -     Cancel: Potassium  -     Basic metabolic panel  (Ca, Cl, CO2, Creat, Gluc, K, Na, BUN)  See the note above    Hypercalcemia  -     Parathyroid Hormone Intact; Future  -     Parathyroid Hormone Intact  We will recheck her calcium  She has appointment with endocrinology for discussing hypercalcemia and treatment of osteopenia with high fracture risk    Iron deficiency  -     Ferritin; Future  -     Ferritin  She is unable to tolerate oral iron she is taking mostly in diet    Elevated glucose  -     Hemoglobin A1c; Future  -     Hemoglobin A1c    Osteopenia with high risk of fracture  She has upcoming appointment with endocrinology    Primary insomnia  She uses Seroquel    Microscopic  "hematuria  -     UA with Microscopic reflex to Culture - lab collect    High priority for 2019-nCoV vaccine  -     COVID-19 BIVALENT 12+ (PFIZER)           BMI:   Estimated body mass index is 33.44 kg/m  as calculated from the following:    Height as of this encounter: 1.575 m (5' 2.01\").    Weight as of this encounter: 83 kg (182 lb 14.4 oz).   Weight management plan: Discussed healthy diet and exercise guidelines    See Patient Instructions  Patient Instructions   The dose of carvedilol is increased to 12.5 mg twice a day  Make appointment with nephrologist   Monitor your blood pressure once a week  at home.  Bring those readings on your next visit.  Notify us if your blood pressure readings consistently stays greater than 140/90.     Follow up in 4 months.  Seek sooner medical attention if there is any worsening of symptoms or problems.       Maribell Castaneda MD  [facility] MILO Morales is a 77 year old, presenting for the following health issues:  Hypertension and Imm/Inj (COVID-19 VACCINE)         View : No data to display.              History of Present Illness       Hypertension: She presents for follow up of hypertension.  She does check blood pressure  regularly outside of the clinic. Outside blood pressures have been over 140/90. She does not follow a low salt diet.     She eats 2-3 servings of fruits and vegetables daily.She consumes 0 sweetened beverage(s) daily.She exercises with enough effort to increase her heart rate 10 to 19 minutes per day.  She exercises with enough effort to increase her heart rate 3 or less days per week.   She is taking medications regularly.         Blood pressure fluctuates a lot  She thinks she needs to do exercise more  She has joint pain  She has gone for physical therapy  Does not feel ready for the surgery of knee replacement    Review of Systems   Constitutional, HEENT, cardiovascular, pulmonary, GI, , musculoskeletal, neuro, skin, " "endocrine and psych systems are negative, except as otherwise noted.      Objective    BP (!) 160/84   Pulse 50   Temp 97.9  F (36.6  C) (Oral)   Resp 16   Ht 1.575 m (5' 2.01\")   Wt 83 kg (182 lb 14.4 oz)   SpO2 97%   BMI 33.44 kg/m    Body mass index is 33.44 kg/m .  Physical Exam   She is very nice and pleasant.  She is comfortable and not in any kind of distress.  She is fully alert awake oriented.      Disclaimer: This note consists of symbols derived from keyboarding, dictation and/or voice recognition software. As a result, there may be errors in the script that have gone undetected. Please consider this when interpreting information found in this chart.              "

## 2023-05-23 NOTE — PATIENT INSTRUCTIONS
The dose of carvedilol is increased to 12.5 mg twice a day  Make appointment with nephrologist   Monitor your blood pressure once a week  at home.  Bring those readings on your next visit.  Notify us if your blood pressure readings consistently stays greater than 140/90.     Follow up in 4 months.  Seek sooner medical attention if there is any worsening of symptoms or problems.

## 2023-05-26 ENCOUNTER — TELEPHONE (OUTPATIENT)
Dept: FAMILY MEDICINE | Facility: CLINIC | Age: 78
End: 2023-05-26

## 2023-05-26 ENCOUNTER — APPOINTMENT (OUTPATIENT)
Dept: LAB | Facility: CLINIC | Age: 78
End: 2023-05-26
Payer: MEDICARE

## 2023-05-26 DIAGNOSIS — N39.0 UTI (URINARY TRACT INFECTION), UNCOMPLICATED: Primary | ICD-10-CM

## 2023-05-26 LAB
ALBUMIN UR-MCNC: NEGATIVE MG/DL
ALDOST SERPL-MCNC: 12.3 NG/DL (ref 0–31)
APPEARANCE UR: CLEAR
BACTERIA #/AREA URNS HPF: ABNORMAL /HPF
BILIRUB UR QL STRIP: NEGATIVE
COLOR UR AUTO: YELLOW
GLUCOSE UR STRIP-MCNC: NEGATIVE MG/DL
HGB UR QL STRIP: NEGATIVE
KETONES UR STRIP-MCNC: NEGATIVE MG/DL
LEUKOCYTE ESTERASE UR QL STRIP: ABNORMAL
NITRATE UR QL: NEGATIVE
PH UR STRIP: 5.5 [PH] (ref 5–7)
RBC #/AREA URNS AUTO: ABNORMAL /HPF
SP GR UR STRIP: 1.02 (ref 1–1.03)
SQUAMOUS #/AREA URNS AUTO: ABNORMAL /LPF
UROBILINOGEN UR STRIP-ACNC: 0.2 E.U./DL
WBC #/AREA URNS AUTO: ABNORMAL /HPF

## 2023-05-26 PROCEDURE — 81001 URINALYSIS AUTO W/SCOPE: CPT | Performed by: INTERNAL MEDICINE

## 2023-05-26 RX ORDER — NITROFURANTOIN 25; 75 MG/1; MG/1
100 CAPSULE ORAL 2 TIMES DAILY
Qty: 10 CAPSULE | Refills: 0 | Status: SHIPPED | OUTPATIENT
Start: 2023-05-26 | End: 2023-09-26

## 2023-05-27 NOTE — RESULT ENCOUNTER NOTE
Mary Carmen    This is to inform you regarding your test result.    I spoke to you on phone but sending you a result note also.  Urine test is positive for infection  Take macrobid as prescribed   Basic metabolic panel which includes electrolytes and kidney fucntion is normal  Glucose which is your blood sugar is slightly elevated.  Avoid high sugar containing food.  HbA1c which is average glucose during last 3 months is 6.1%  You are prediabetic and your numbers getting worse  Watch your sugar containing food and do regular physical activity to avoid progression to diabetes  Ferritin which is iron stores in the body is improving  Parathyroid hormone level is normal.            Sincerely,      Dr.Nasima Tonya MD,FACP

## 2023-05-30 ENCOUNTER — TELEPHONE (OUTPATIENT)
Dept: NEPHROLOGY | Facility: CLINIC | Age: 78
End: 2023-05-30
Payer: MEDICARE

## 2023-05-30 LAB
ALDOST/RENIN PLAS-RTO: 30.8 {RATIO} (ref 0–25)
RENIN PLAS-CCNC: 0.4 NG/ML/HR

## 2023-05-30 NOTE — TELEPHONE ENCOUNTER
[facility] Call Center    Phone Message    May a detailed message be left on voicemail: yes     Reason for Call: Order(s): Other:   Reason for requested: new neph appt  Date needed: 10/1/23  Provider name: Dr. Lake    Action Taken: Message routed to:  Other: [facility]    Travel Screening: Not Applicable

## 2023-06-05 NOTE — RESULT ENCOUNTER NOTE
Mary Morales    This is to inform you regarding your test result.    Your aldosterone and renin ratio is elevated  There is a possibility of hyperaldosteronism  You have an appointment to see endocrinologist regarding your bone density  I will ask her to see if she can address this issue also      Sincerely,      Dr.Nasima Tonya MD,FACP

## 2023-08-16 ENCOUNTER — PATIENT OUTREACH (OUTPATIENT)
Dept: CARE COORDINATION | Facility: CLINIC | Age: 78
End: 2023-08-16
Payer: MEDICARE

## 2023-08-30 ENCOUNTER — PATIENT OUTREACH (OUTPATIENT)
Dept: CARE COORDINATION | Facility: CLINIC | Age: 78
End: 2023-08-30
Payer: MEDICARE

## 2023-09-18 DIAGNOSIS — I10 HTN, GOAL BELOW 140/90: Primary | Chronic | ICD-10-CM

## 2023-09-24 ASSESSMENT — ASTHMA QUESTIONNAIRES: ACT_TOTALSCORE: 16

## 2023-09-26 ENCOUNTER — OFFICE VISIT (OUTPATIENT)
Dept: FAMILY MEDICINE | Facility: CLINIC | Age: 78
End: 2023-09-26
Payer: MEDICARE

## 2023-09-26 VITALS
DIASTOLIC BLOOD PRESSURE: 70 MMHG | OXYGEN SATURATION: 96 % | BODY MASS INDEX: 33.9 KG/M2 | TEMPERATURE: 97.7 F | RESPIRATION RATE: 18 BRPM | HEART RATE: 55 BPM | WEIGHT: 185.4 LBS | SYSTOLIC BLOOD PRESSURE: 130 MMHG

## 2023-09-26 DIAGNOSIS — Z23 NEED FOR PROPHYLACTIC VACCINATION AND INOCULATION AGAINST INFLUENZA: ICD-10-CM

## 2023-09-26 DIAGNOSIS — H81.13 BENIGN PAROXYSMAL POSITIONAL VERTIGO DUE TO BILATERAL VESTIBULAR DISORDER: ICD-10-CM

## 2023-09-26 DIAGNOSIS — J45.20 MILD INTERMITTENT ASTHMA WITHOUT COMPLICATION: Primary | ICD-10-CM

## 2023-09-26 DIAGNOSIS — R06.02 SOB (SHORTNESS OF BREATH): ICD-10-CM

## 2023-09-26 DIAGNOSIS — I1A.0 RESISTANT HYPERTENSION: ICD-10-CM

## 2023-09-26 PROCEDURE — G0008 ADMIN INFLUENZA VIRUS VAC: HCPCS | Performed by: INTERNAL MEDICINE

## 2023-09-26 PROCEDURE — 99214 OFFICE O/P EST MOD 30 MIN: CPT | Mod: 25 | Performed by: INTERNAL MEDICINE

## 2023-09-26 PROCEDURE — 90662 IIV NO PRSV INCREASED AG IM: CPT | Performed by: INTERNAL MEDICINE

## 2023-09-26 RX ORDER — FLUTICASONE PROPIONATE AND SALMETEROL 250; 50 UG/1; UG/1
1 POWDER RESPIRATORY (INHALATION) EVERY 12 HOURS
Qty: 60 EACH | Refills: 3 | Status: SHIPPED | OUTPATIENT
Start: 2023-09-26 | End: 2024-07-30

## 2023-09-26 ASSESSMENT — PAIN SCALES - GENERAL: PAINLEVEL: NO PAIN (0)

## 2023-09-26 NOTE — PATIENT INSTRUCTIONS
You are due for new Covid booster,  and RSV vaccine   Start using Advair one inhalation daily and twice daily when you have flare up  Rinse your mouth after each use   Please call [facility]  [phone] to schedule Echocardiogram   It is done at [address]    Keep your appointment with nephrology as scheduled .  Follow up in 4 months.  Seek sooner medical attention if there is any worsening of symptoms or problems.
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
## 2023-10-02 ENCOUNTER — LAB (OUTPATIENT)
Dept: LAB | Facility: CLINIC | Age: 78
End: 2023-10-02
Attending: INTERNAL MEDICINE
Payer: MEDICARE

## 2023-10-02 DIAGNOSIS — I10 HTN, GOAL BELOW 140/90: ICD-10-CM

## 2023-10-02 LAB
ALBUMIN MFR UR ELPH: 13.7 MG/DL
ALBUMIN UR-MCNC: NEGATIVE MG/DL
ANION GAP SERPL CALCULATED.3IONS-SCNC: 10 MMOL/L (ref 7–15)
APPEARANCE UR: CLEAR
BACTERIA #/AREA URNS HPF: ABNORMAL /HPF
BILIRUB UR QL STRIP: NEGATIVE
BUN SERPL-MCNC: 16 MG/DL (ref 8–23)
CALCIUM SERPL-MCNC: 9.5 MG/DL (ref 8.8–10.2)
CHLORIDE SERPL-SCNC: 104 MMOL/L (ref 98–107)
COLOR UR AUTO: YELLOW
CREAT SERPL-MCNC: 0.77 MG/DL (ref 0.51–0.95)
CREAT UR-MCNC: 68.9 MG/DL
CREAT UR-MCNC: 68.9 MG/DL
DEPRECATED HCO3 PLAS-SCNC: 25 MMOL/L (ref 22–29)
EGFRCR SERPLBLD CKD-EPI 2021: 78 ML/MIN/1.73M2
ERYTHROCYTE [DISTWIDTH] IN BLOOD BY AUTOMATED COUNT: 12.6 % (ref 10–15)
GLUCOSE SERPL-MCNC: 152 MG/DL (ref 70–99)
GLUCOSE UR STRIP-MCNC: NEGATIVE MG/DL
HCT VFR BLD AUTO: 40.3 % (ref 35–47)
HGB BLD-MCNC: 12.6 G/DL (ref 11.7–15.7)
HGB UR QL STRIP: ABNORMAL
KETONES UR STRIP-MCNC: NEGATIVE MG/DL
LEUKOCYTE ESTERASE UR QL STRIP: ABNORMAL
MCH RBC QN AUTO: 27.5 PG (ref 26.5–33)
MCHC RBC AUTO-ENTMCNC: 31.3 G/DL (ref 31.5–36.5)
MCV RBC AUTO: 88 FL (ref 78–100)
MICROALBUMIN UR-MCNC: <12 MG/L
MICROALBUMIN/CREAT UR: NORMAL MG/G{CREAT}
NITRATE UR QL: NEGATIVE
PH UR STRIP: 7 [PH] (ref 5–7)
PLATELET # BLD AUTO: 151 10E3/UL (ref 150–450)
POTASSIUM SERPL-SCNC: 4.6 MMOL/L (ref 3.4–5.3)
PROT/CREAT 24H UR: 0.2 MG/MG CR (ref 0–0.2)
RBC # BLD AUTO: 4.58 10E6/UL (ref 3.8–5.2)
RBC #/AREA URNS AUTO: ABNORMAL /HPF
SODIUM SERPL-SCNC: 139 MMOL/L (ref 135–145)
SP GR UR STRIP: 1.01 (ref 1–1.03)
SQUAMOUS #/AREA URNS AUTO: ABNORMAL /LPF
UROBILINOGEN UR STRIP-ACNC: 1 E.U./DL
WBC # BLD AUTO: 4.6 10E3/UL (ref 4–11)
WBC #/AREA URNS AUTO: ABNORMAL /HPF
WBC CLUMPS #/AREA URNS HPF: PRESENT /HPF

## 2023-10-02 PROCEDURE — 99000 SPECIMEN HANDLING OFFICE-LAB: CPT

## 2023-10-02 PROCEDURE — 81001 URINALYSIS AUTO W/SCOPE: CPT

## 2023-10-02 PROCEDURE — 82088 ASSAY OF ALDOSTERONE: CPT

## 2023-10-02 PROCEDURE — 85027 COMPLETE CBC AUTOMATED: CPT

## 2023-10-02 PROCEDURE — 82043 UR ALBUMIN QUANTITATIVE: CPT

## 2023-10-02 PROCEDURE — 80048 BASIC METABOLIC PNL TOTAL CA: CPT

## 2023-10-02 PROCEDURE — 84156 ASSAY OF PROTEIN URINE: CPT

## 2023-10-02 PROCEDURE — 82570 ASSAY OF URINE CREATININE: CPT

## 2023-10-02 PROCEDURE — 36415 COLL VENOUS BLD VENIPUNCTURE: CPT

## 2023-10-02 PROCEDURE — 84244 ASSAY OF RENIN: CPT | Mod: 90

## 2023-10-04 ENCOUNTER — OFFICE VISIT (OUTPATIENT)
Dept: NEPHROLOGY | Facility: CLINIC | Age: 78
End: 2023-10-04
Attending: INTERNAL MEDICINE
Payer: MEDICARE

## 2023-10-04 VITALS
RESPIRATION RATE: 18 BRPM | HEART RATE: 66 BPM | BODY MASS INDEX: 33.72 KG/M2 | SYSTOLIC BLOOD PRESSURE: 148 MMHG | WEIGHT: 184.4 LBS | OXYGEN SATURATION: 97 % | DIASTOLIC BLOOD PRESSURE: 82 MMHG

## 2023-10-04 DIAGNOSIS — I1A.0 RESISTANT HYPERTENSION: ICD-10-CM

## 2023-10-04 PROCEDURE — 99205 OFFICE O/P NEW HI 60 MIN: CPT | Performed by: INTERNAL MEDICINE

## 2023-10-04 RX ORDER — METOPROLOL SUCCINATE 25 MG/1
25 TABLET, EXTENDED RELEASE ORAL DAILY
Qty: 90 TABLET | Refills: 1 | Status: SHIPPED | OUTPATIENT
Start: 2023-10-04 | End: 2024-01-29

## 2023-10-04 RX ORDER — CLONIDINE HYDROCHLORIDE 0.1 MG/1
0.1 TABLET ORAL 2 TIMES DAILY
Qty: 180 TABLET | Refills: 3 | Status: SHIPPED | OUTPATIENT
Start: 2023-10-04 | End: 2024-01-29

## 2023-10-04 RX ORDER — AMLODIPINE BESYLATE 2.5 MG/1
2.5 TABLET ORAL DAILY
Qty: 90 TABLET | Refills: 1 | Status: SHIPPED | OUTPATIENT
Start: 2023-10-04 | End: 2024-07-30

## 2023-10-04 NOTE — PATIENT INSTRUCTIONS
It was a pleasure taking care of you today.  I've included a brief summary of our discussion and care plan from today's visit below.  Please review this information with your primary care provider.    My recommendations are summarized as follows:  -Keep the amount of sodium in your diet at 2.4 g/day (also see instructions attached in that regard)  -Keep a Blood Pressure diary by taking your blood pressure twice a day as instructed (also see instructions attached in that regard).Please make sure that you are using a validated blood pressure device by checking that it is the case at: https://www.validatebp.org/  -Avoid all NSAID's. Examples include Ibuprofen (Advil, Motrin), naprosyn (Aleve), celebrex among others. Acetaminophen (Tylenol) is ok with maximum dose in 24 hours of 3200 mg.  -Do not exceed one alcoholic drink per day.  -If for any reason, you are at risk of volume depletion/dehydration (high grade fevers, severe vomiting or diarrhea) stop lisinopril till you get better  -Please decrease clonidine to 0.1 mg twice daily  -Please start metoprolol succinate 25 mg once daily  -Please start amlodipine 2.5 mg daily    - Return to Nephrology Clinic in 6 weeks to review your progress.     To schedule imaging please call [phone]. To schedule your lab appointment at [address] lab call [phone]    Who do I call with any questions after my visit?  Please be in touch if there are any further questions that arise following today's visit.  There are multiple ways to contact your nephrology care team.      During business hours, you may reach your Nephrology RN Care Coordinator, Bertha Cuenca at . To schedule or reschedule an appointment, please call [phone].    You can always send a secure message through [employer].  [employer] messages are answered by your nurse or doctor typically within 24 hours.  Please allow extra time on weekends and holidays.      For urgent/emergent  questions after business hours, you may reach the on-call Nephrology Fellow by contacting the [facility]  at [phone].     How will I get the results of any tests ordered?    You will receive all of your results.  If you have signed up for [employer], any tests ordered at your visit will be available to you after your physician reviews them.  Typically this takes 1-2 weeks.  If there are urgent results that require a change in your care plan, your physician or nurse will call you to discuss the next steps.      What is [employer]?  [employer] is a secure way for you to access all of your healthcare records from the [facility].  It is a web based computer program, so you can sign on to it from any location.  It also allows you to send secure messages to your care team.  I recommend signing up for [employer] access if you have not already done so and are comfortable with using a computer.      How do I schedule a follow-up visit?  If you did not schedule a follow-up visit today, please call [phone] to schedule a follow-up office visit.      Sincerely,    Apolonia Lake MD  [facility]  Division of Nephrology and Hypertension

## 2023-10-04 NOTE — PROGRESS NOTES
Nephrology Clinic    Carmen Bonner MRN:[id number] YOB: 1945  Date of Service: 10/04/2023  Primary care provider: Maribell Castaneda  Requesting physician: Maribell Castaneda      REASON FOR CONSULT: Hypertension    HISTORY OF PRESENT ILLNESS:   Carmen Bonner is a 77 year old female who presents for evaluation of hypertension.  The past medical history is significant for obesity, asthma and hypertension for the past 20 years.   For her hypertension she is currently on clonidine 0.2 mg twice daily and lisinopril 40 mg daily. She is also supposed to be on carvedilol but she hasn't been taking it as it has been giving her lower extremity edema. She has also tried in the past amlodipine 10 mg daily but stopped it for the same issue. In addition, hydrochlorothiazide was prescribed in the past but was stopped because of hyponatremia. She reports that her BP is reasonably well controlled at home. She is being referred because she was noticed on recent labs done in May 2023 to have an elevated renin aldosterone ratio at 30.8 (aldosterone level 12.3). Her weight has been stable and her BMI is 33.72.  Her kidney function is intact with a creatinine level at 0.77 mg/dL on 10/02 and she has no evidence of albuminuria with a negative uACR on 10/02.   Renal imaging done in March 2023 to rule out renal artery stenosis shows a right renal cyst only.     EXAM: US RENAL COMPLETE WITH ARTERIAL DUPLEX      INDICATION: Resistant hypertension, evaluate for renal artery stenosis     COMPARISON: No pertinent comparison study is available for review.      TECHNIQUE: Real-time gray-scale sonographic imaging of the kidneys was  performed including duplex spectral and color Doppler evaluation of  the renal arteries.     RENAL ULTRASOUND FINDINGS:    RIGHT KIDNEY: Normal parenchymal echogenicity, without hydronephrosis,  measuring approximately 9.8 x 5.3 x 3.9 cm. There is a cyst in the  right lateral kidney.     LEFT KIDNEY: Normal  parenchymal echogenicity, without hydronephrosis,  measuring approximately 10.7 x 4.9 x 5.4 cm.     Bladder:The bladder appears unremarkable     DUPLEX ARTERIAL ULTRASOUND FINDINGS:  Systolic Velocities (cm/s):     RIGHT RENAL ARTERY:  Proximal: 125                Mid: 125                 Distal: 109                    LEFT RENAL ARTERY:  Proximal: Not visualized due to overlying bowel gas                Mid: 189                 Distal:  80                    AORTA: 110     Arcuate arterial resistive indices range from 0.80-0.82 on the right  and 0.80-0.82 on the left.                                                                      IMPRESSION:  1.  Origin of the left renal artery not visualized. Further evaluation  could be performed with a CT angiogram.  2.  Per sonographic velocity criteria, no evidence for significant  right renal artery stenosis.     Renal artery stenosis parameters:     increased peak systolic velocity (PSV): some advocate 180 cm/s      increased renal-aortic ratio (RAR), i.e. PSVrenal/PSVaorta: usually  taken as >3.5     turbulent flow in a post-stenotic area     pulsus parvus et tardus waveform (slow-rising) due to stenosis     decreased (interlobar) renal arterial resistive index (RI): <0.55 in  severe stenosis      TIFF POLLOCK MD     The following portions of the patient's history were reviewed and updated as appropriate: allergies, current medications, past family history, past medical history, past social history, past surgical history and problem list.    PAST MEDICAL HISTORY:  Past Medical History:   Diagnosis Date    Allergic rhinitis     Anxiety     on seroquel    Asthma     Cervicalgia     Chronic pain     Depressive disorder 11/1/1973    Diabetes (H)     now diet controlled    DJD (degenerative joint disease) of lumbar spine     CHRONIC HAS SEEN PAIN CLINIC- put on tizanidine     Endometriosis     Finger pain     Finger swelling     Flank pain     GERD (gastroesophageal  reflux disease)     Hematuria     Herpes     Hypertension     Hyponatremia     Insomnia     on seroquel    Insomnia     Knee pain     Mumps     Purpura (H)     SAW DERM     Recurrent UTI     Skin cancer     basal cell     Stress incontinence     Upper back pain      PAST SURGICAL HISTORY:  Past Surgical History:   Procedure Laterality Date    ABDOMEN SURGERY  1993,1973    APPENDECTOMY  1993    CHOLECYSTECTOMY      COLONOSCOPY N/A 04/28/2022    Procedure: COLONOSCOPY;  Surgeon: Dimitry Pearl MD;  Location:  GI    CYSTOSCOPY      ESOPHAGOSCOPY, GASTROSCOPY, DUODENOSCOPY (EGD), COMBINED N/A 04/28/2022    Procedure: Esophagoscopy, gastroscopy, duodenoscopy (EGD), combined;  Surgeon: Dimitry Pearl MD;  Location:  GI    HYSTERECTOMY TOTAL ABDOMINAL       MEDICATIONS:  Prescription Medications as of 10/4/2023         Rx Number Disp Refills Start End Last Dispensed Date Next Fill Date Owning Pharmacy    acetaminophen (TYLENOL) 500 MG tablet            Sig: Take 500-1,000 mg by mouth every 6 hours as needed for mild pain    Class: Historical    Route: Oral    albuterol (PROAIR HFA) 108 (90 Base) MCG/ACT inhaler  18 g 1 3/20/2023    [facility]/pharmacy #[id number] - [place], [place] - [address] [place] [address]    Sig: Inhale 2 puffs into the lungs every 4 hours as needed for shortness of breath or wheezing    Class: E-Prescribe    Notes to Pharmacy: Pharmacy may dispense brand covered by insurance (Proair, or proventil or ventolin or generic albuterol inhaler)Profile Rx: patient will contact pharmacy when needed    Route: Inhalation    carvedilol (COREG) 12.5 MG tablet  180 tablet 1 5/23/2023    [facility]/pharmacy #[id number] - [place], [place] - [address] [facility]    Sig: Take 1 tablet (12.5 mg) by mouth 2 times daily (with meals) For Blood Pressure    Class: E-Prescribe    Notes to Pharmacy: Dose is increased    Route: Oral    cholecalciferol 25 MCG (1000 UT) CHEW            Sig: Take 1 chew tab by mouth daily    Class: Historical    Route: Oral     cloNIDine (CATAPRES) 0.2 MG tablet  180 tablet 1 5/23/2023    [facility]/pharmacy #[id number] [place], [place] - [address] [facility]    Sig: Take 1 tablet (0.2 mg) by mouth 2 times daily for Blood Pressure    Class: E-Prescribe    Route: Oral    conjugated estrogens (PREMARIN) 0.625 MG/GM vaginal cream  30 g 1 1/31/2022    [facility]/pharmacy #[facility] [place], [place] [address] [facility]    Sig: Place 1 g vaginally twice a week    Class: E-Prescribe    Route: Vaginal    fluconazole (DIFLUCAN) 150 MG tablet  3 tablet 0 9/14/2022    [facility]/pharmacy #[place], [address]    Sig: Take 1 tablet (150 mg) by mouth once as needed    Class: E-Prescribe    Route: Oral    fluticasone-salmeterol (ADVAIR) 250-50 MCG/ACT inhaler  60 each 3 9/26/2023    [facility]/pharmacy #[place], [place] - [address] [place] [address]    Sig: Inhale 1 puff into the lungs every 12 hours    Class: E-Prescribe    Route: Inhalation    LANsoprazole (PREVACID) 30 MG DR capsule    9/15/2022    [facility]/pharmacy #[place], [address]    Sig: Take 1 capsule (30 mg) by mouth daily    Class: Historical    Route: Oral    lisinopril (ZESTRIL) 40 MG tablet  90 tablet 3 2/7/2023    [facility]/pharmacy #[place], [place] - [address] [place] [address]    Sig: TAKE 1 TABLET (40 MG) BY MOUTH DAILY FOR BLOOD PRESSURE    Class: E-Prescribe    Route: Oral    montelukast (SINGULAIR) 10 MG tablet  90 tablet 3 9/11/2023    [facility]/pharmacy #[place], [address]    Sig: TAKE 1 TABLET BY MOUTH EVERYDAY AT BEDTIME    Class: E-Prescribe    nystatin (MYCOSTATIN) 209080 UNIT/GM external powder  60 g 0 4/25/2022    [facility]/pharmacy #[place], [address]    Sig: Apply topically 2 times daily    Class: E-Prescribe    Route: Topical    QUEtiapine (SEROQUEL) 100 MG tablet  135 tablet 3 3/20/2023    [facility]/pharmacy #[place], [place] - [address] [facility]    Sig: Take 1.5 tablets (150 mg) by mouth At Bedtime for sleep    Class: E-Prescribe    Route: Oral    rosuvastatin (CRESTOR) 5 MG tablet  90 tablet 3 3/20/2023     [facility]/pharmacy #[id number] - [place], [place] - [address] [facility]    Sig: Take 1 tablet (5 mg) by mouth daily for cholestrol    Class: E-Prescribe    Route: Oral    tiZANidine (ZANAFLEX) 2 MG tablet  180 tablet 1 3/20/2023    [facility]/pharmacy #[id number] - [place], [place] - [address] [facility]    Sig: TAKE 1-2 TABLETS BY MOUTH DAILY AS NEEDED FOR MUSCLE SPASMS    Class: E-Prescribe    triamcinolone (KENALOG) 0.1 % cream  45 g 1 4/17/2015    [facility]/pharmacy #[id number] - [place], [place] - [address] [facility]    Sig: Apply topically 2 times daily    Class: Historical    Route: Topical    valACYclovir (VALTREX) 1000 mg tablet  12 tablet 1 4/30/2018    [facility]pharmacy #[id number] - [place], [place] - [address] [facility]    Sig: Take 1 tablet (1,000 mg) by mouth as needed Herpes Outbreak    Class: E-Prescribe    Route: Oral          Clinic-Administered Medications as of 10/4/2023         Dose Frequency Start End    denosumab (PROLIA) injection 60 mg 60 mg EVERY 6 MONTHS 2/2/2023     Route: Subcutaneous           ALLERGIES:    Allergies   Allergen Reactions    Boniva [Ibandronic Acid]      Intolerance any stomach upset    Fosamax [Alendronate]      Stomach upset    Hctz [Hydrochlorothiazide] Other (See Comments)     Low sodium    Lexapro [Escitalopram] Nausea    Omeprazole Diarrhea    Sulfa Antibiotics Itching    Venlafaxine Nausea    Amlodipine Swelling     swelling of foot    Reclast [Zoledronic Acid]      Intolerance body aches     REVIEW OF SYSTEMS:  Review Of Systems  Skin: negative for, pigmentation, acne, rash, scaling, itching, bruising, lumps or bumps, hair changes, nail changes  Eyes: negative for, visual blurring, double vision, glaucoma, cataracts, eye pain, color blindness, glasses, contacts, photophobia, redness, tearing  Ears/Nose/Throat: negative for, nasal congestion, purulent rhinorrhea, sneezing, postnasal drainage, hearing loss, deafness, tinnitus, vertigo, epistaxis, deviated septum, frequent URI's, sinus trouble  Respiratory: No shortness of breath, dyspnea on exertion,  "cough, or hemoptysis  Cardiovascular: negative for, palpitations, tachycardia, irregular heart beat, chest pain, exertional chest pain or pressure, paroxysmal nocturnal dyspnea, dyspnea on exertion, orthopnea, and lower extremity edema  Gastrointestinal: negative for, poor appetite, dysphagia, nausea, vomiting, heartburn, dyspepsia, reflux, hematemesis, abdominal pain, and excessive gas or bloating  Genitourinary: negative for, nocturia, dysuria, frequency, urgency, hesitancy, hematuria, retention, decreased urinary stream, incontinence, and kidney stone  Musculoskeletal: negative for, fracture, back pain, neck pain, arthritis, joint pain, joint swelling, joint stiffness, gout, and fibromyalgia  Neurologic: negative for, headaches, syncope, stroke, seizures, paralysis, local weakness, numbness or tingling of hands, numbness or tingling of feet, and involuntary movements    A comprehensive review of systems was performed and found to be negative except as described here or above.  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 No   Social History Narrative    Health maintenance          "    ADVANCED DIRECTIVE PAMPHLET     COLONOSCOPY     DEXA     ANNUAL WELLNESS VISIT    ASTHMA CONTROL TEST/ ACTION PLAN             LOOSE STOOLS       Duration: frequent looser stools / gurgling noises        No improvement with the fodmaps diet        Better off nexium/         Now on prevacid          Associated flank pain: None      Intensity:  moderate      Accompanying signs and symptoms:         Fever/Chills: no         Gas/Bloating: YES         Nausea/vomitting: no         Diarrhea: YES         Dysuria or Hematuria: no      Now regulating it with diet      Precipitating or alleviating factors:         Pain worse with eating/BM/urination: yes            Therapies tried and outcome: fodmap diet        LMP:  not applicable                         Back Pain            Duration:      HAS CHRONIC LOW BACK PAIN HAS BEEN TO PAIN CLINIC  WAS PUT ON TIZANIDINE  DOES REGULAR BACK EXERCISES  Takes up to 2  A day of tizanidine with tylenol MRI - showed mild to moderate degenerative changes        NEW PAIN UPPER BACK  Had it a few time over the last 2-3 years        3 months, worsening with yoga         Specific cause: none      Description (location/character/radiation): mid back        Intensity:  moderate, severe SHARP PAIN  SAME ON BOTH SIDE        Accompanying signs and symptoms (weakness/fever/urinary symptoms): none      History (similar episodes/surgery/injury):        Precipitating or alleviating factors: YOGA        Therapies tried and outcome: exercise, tylenol       Social Determinants of Health     Financial Resource Strain: Low Risk  (9/24/2023)    Financial Resource Strain     Within the past 12 months, have you or your family members you live with been unable to get utilities (heat, electricity) when it was really needed?: No   Food Insecurity: Low Risk  (9/24/2023)    Food Insecurity     Within the past 12 months, did you worry that your food would run out before you got money to buy more?: No     Within the  past 12 months, did the food you bought just not last and you didn t have money to get more?: No   Transportation Needs: Low Risk  (9/24/2023)    Transportation Needs     Within the past 12 months, has lack of transportation kept you from medical appointments, getting your medicines, non-medical meetings or appointments, work, or from getting things that you need?: No   Physical Activity: Not on file   Stress: Not on file   Social Connections: Not on file   Interpersonal Safety: Low Risk  (9/24/2023)    Interpersonal Safety     Do you feel physically and emotionally safe where you currently live?: Yes     Within the past 12 months, have you been hit, slapped, kicked or otherwise physically hurt by someone?: No     Within the past 12 months, have you been humiliated or emotionally abused in other ways by your partner or ex-partner?: No   Housing Stability: Low Risk  (9/24/2023)    Housing Stability     Do you have housing? : Yes     Are you worried about losing your housing?: No     FAMILY MEDICAL HISTORY:   Family History   Problem Relation Age of Onset    Cancer Mother     Osteoporosis Mother     Cerebrovascular Disease Father     Alzheimer Disease Father     Genitourinary Problems Maternal Grandmother     Heart Disease Paternal Grandfather     Osteoporosis Sister     Arthritis Sister     Breast Cancer Paternal Grandmother     Colon Cancer No family hx of      PHYSICAL EXAM:   There were no vitals taken for this visit.  GENERAL APPEARANCE: alert and no distress  EYES: nonicteric  HENT: mouth without ulcers or lesions  NECK: supple, no adenopathy  RESP: lungs clear to auscultation   CV: regular rhythm, normal rate, no rub  ABDOMEN: soft, nontender, normal bowel sounds, no HSM   Extremities: no clubbing, cyanosis, or edema  MS: no evidence of inflammation in joints, no muscle tenderness  SKIN: no rash  NEURO: mentation intact and speech normal  PSYCH: affect normal/bright   LABS:   Recent Results (from the past 672  hour(s))   Basic metabolic panel    Collection Time: 10/02/23  2:42 PM   Result Value Ref Range    Sodium 139 135 - 145 mmol/L    Potassium 4.6 3.4 - 5.3 mmol/L    Chloride 104 98 - 107 mmol/L    Carbon Dioxide (CO2) 25 22 - 29 mmol/L    Anion Gap 10 7 - 15 mmol/L    Urea Nitrogen 16.0 8.0 - 23.0 mg/dL    Creatinine 0.77 0.51 - 0.95 mg/dL    GFR Estimate 78 >60 mL/min/1.73m2    Calcium 9.5 8.8 - 10.2 mg/dL    Glucose 152 (H) 70 - 99 mg/dL   CBC with platelets    Collection Time: 10/02/23  2:42 PM   Result Value Ref Range    WBC Count 4.6 4.0 - 11.0 10e3/uL    RBC Count 4.58 3.80 - 5.20 10e6/uL    Hemoglobin 12.6 11.7 - 15.7 g/dL    Hematocrit 40.3 35.0 - 47.0 %    MCV 88 78 - 100 fL    MCH 27.5 26.5 - 33.0 pg    MCHC 31.3 (L) 31.5 - 36.5 g/dL    RDW 12.6 10.0 - 15.0 %    Platelet Count 151 150 - 450 10e3/uL   Albumin Random Urine Quantitative with Creat Ratio    Collection Time: 10/02/23  2:47 PM   Result Value Ref Range    Creatinine Urine mg/dL 68.9 mg/dL    Albumin Urine mg/L <12.0 mg/L    Albumin Urine mg/g Cr     Protein  random urine    Collection Time: 10/02/23  2:47 PM   Result Value Ref Range    Total Protein Urine mg/dL 13.7   mg/dL    Total Protein Urine mg/mg Creat 0.20 0.00 - 0.20 mg/mg Cr    Creatinine Urine mg/dL 68.9 mg/dL   UA with Microscopic    Collection Time: 10/02/23  2:47 PM   Result Value Ref Range    Color Urine Yellow Colorless, Straw, Light Yellow, Yellow    Appearance Urine Clear Clear    Glucose Urine Negative Negative mg/dL    Bilirubin Urine Negative Negative    Ketones Urine Negative Negative mg/dL    Specific Gravity Urine 1.015 1.003 - 1.035    Blood Urine Trace (A) Negative    pH Urine 7.0 5.0 - 7.0    Protein Albumin Urine Negative Negative mg/dL    Urobilinogen Urine 1.0 0.2, 1.0 E.U./dL    Nitrite Urine Negative Negative    Leukocyte Esterase Urine Small (A) Negative   Urine Microscopic Exam    Collection Time: 10/02/23  2:47 PM   Result Value Ref Range    Bacteria Urine Few (A)  None Seen /HPF    RBC Urine 2-5 (A) 0-2 /HPF /HPF    WBC Urine 5-10 (A) 0-5 /HPF /HPF    Squamous Epithelials Urine Few (A) None Seen /LPF    WBC Clumps Urine Present (A) None Seen /HPF     CMP  Recent Labs   Lab Test 10/02/23  1442 05/23/23  1541 03/17/23  1504 09/15/22  1359 11/05/21  1535 06/25/21  1534 04/22/21  1514 02/04/21  1525 08/07/20  1528 07/29/20  1357 07/29/19  1625 10/31/18  2257 01/17/17  1047 04/19/16  1450    139 141 136   < > 136 135 135 132* 126*   < > 136   < > 136   POTASSIUM 4.6 4.4 4.6 4.2   < > 4.5 4.2 4.3 4.3 4.2   < > 3.4   < > 4.1   CHLORIDE 104 104 106 103   < > 103 104 103 98 92*   < > 103   < > 101   CO2 25 25 26 28   < > 29 30 24 24 25   < > 26   < > 24   ANIONGAP 10 10 9 5   < > 4 1* 8 10 9   < > 7   < > 11   * 103* 126* 95   < > 107* 96 106* 105* 97   < > 118*   < > 84   BUN 16.0 18.0 16.9 20   < > 13 18 19 18 22   < > 18   < > 15   CR 0.77 0.81 0.76 0.75   < > 0.76 0.81 0.80 0.79 0.88   < > 0.71   < > 0.66   GFRESTIMATED 78 73 79 81   < > 75 70 72 73 64   < > 80   < > 88   GFRESTBLACK  --   --   --   --   --  87 81 83 84 74   < > >90   < > >90  African American GFR Calc     FABY 9.5 9.4 12.5* 9.6   < > 9.4 9.2 9.8 9.2 9.2   < > 9.3   < > 9.4   MAG  --   --   --   --   --   --   --   --   --   --   --  2.3  --   --    PROTTOTAL  --   --  7.2 7.8  --   --   --   --   --  8.2  --   --   --  8.0   ALBUMIN  --   --  4.3 4.0  --   --   --   --   --  4.0  --   --   --  4.3   BILITOTAL  --   --  0.3 0.4  --   --   --   --   --  0.4  --   --   --  0.4   ALKPHOS  --   --  62 63  --   --   --   --   --  67  --   --   --  52   AST  --   --  20 16  --   --   --   --   --  15  --   --   --  13   ALT  --   --  14 20  --   --  27  --   --  19  --   --   --  25    < > = values in this interval not displayed.     CBC  Recent Labs   Lab Test 10/02/23  1442 09/15/22  1359 11/05/21  1535 06/25/21  1534   HGB 12.6 13.2 12.9 13.4   WBC 4.6 6.0 5.9 5.8   RBC 4.58 4.76 4.44 4.88   HCT 40.3 40.9  38.8 40.9   MCV 88 86 87 84   MCH 27.5 27.7 29.1 27.5   MCHC 31.3* 32.3 33.2 32.8   RDW 12.6 12.2 13.9 13.8    183 192 199     INRNo lab results found.  ABGNo lab results found.   URINE STUDIES  Recent Labs   Lab Test 10/02/23  1447 05/26/23  1410 09/26/22  1921 03/31/22  1557 03/02/22  1438 12/31/21  1327   COLOR Yellow Yellow  --  Yellow Yellow Yellow   APPEARANCE Clear Clear  --  Clear Clear Clear   URINEGLC Negative Negative  --  Negative Negative Negative   URINEBILI Negative Negative  --  Negative Negative Negative   URINEKETONE Negative Negative  --  Negative Negative Negative   SG 1.015 1.020  --  1.015 1.015 1.015   UBLD Trace* Negative  --  Trace* Small* Trace*   URINEPH 7.0 5.5  --  7.0 7.0 6.0   PROTEIN Negative Negative  --  Negative 100* Negative   UROBILINOGEN 1.0 0.2  --  0.2 0.2 0.2   NITRITE Negative Negative  --  Negative Negative Negative   LEUKEST Small* Small*  --  Trace* Small* Trace*   RBCU 2-5* 2-5* 2-5*  --   --  0-2   WBCU 5-10* 5-10* 0-5  --   --  0-5     No lab results found.    ASSESSMENT AND PLAN:   #HTN  Longstanding and pseudoresistant given the sub optimal regimen. Also primary hyperaldosteronism is highly unlikely in this patient  as the use of lisinopril could falsely elevate the ratio and also the aldosterone level is less than 15. Her hypertension is driven to a certain extent by obesity and lifestyle. Given the short life of clonidine and the rebound hypertension that one would get, will decrease the dose to 0.1 mg twice daily and add concomitantly a small dose of metoprolol succinate at 25 mg and of amlodipine at 2.5 mg. The patient was instructed to keep a BP diary and to communicate the results. She was also instructed to keep the sodium intake at 2 g/day, to lose weight, to exercise and to avoid NSAIDs    #Asthma/COPD    On montelukast and fluticasone/salmeterol    #Blood count  Hemoglobin 12.6 no acute issue    #Acid-base status  CO2 level 25 no acute  issue    #Electrolytes  Na 139  K 4.6 no acute issue     #BMD  Ca    9.5      alb 4.3  Vitamin D level 28 patient on cholecalciferol and also getting denosumab for osteopenia, will order a phosphorus level    The total time of this encounter amounted to 60 minutes on the day of the encounter. This time included time spent with the patient, reviewing records, ordering tests, and performing post visit documentation.     The patient will return to follow up in 6 weeks    Apolonia Lake MD  Division of Renal Disease and Hypertension  October 4, 2023  2:47 PM

## 2023-10-04 NOTE — NURSING NOTE
Chief Complaint   Patient presents with    New Patient     Resistant hypertension       Vitals:    10/04/23 1451   BP: (!) 168/76   BP Location: Left arm   Patient Position: Sitting   Cuff Size: Adult Large   Pulse: 66   Resp: 18   SpO2: 97%   Weight: 83.6 kg (184 lb 6.4 oz)       Body mass index is 33.72 kg/m .    
English

## 2023-10-06 LAB
ALDOST SERPL-MCNC: 15.8 NG/DL (ref 0–31)
RENIN PLAS-CCNC: 0.2 NG/ML/HR

## 2023-10-09 ENCOUNTER — MYC MEDICAL ADVICE (OUTPATIENT)
Dept: NEPHROLOGY | Facility: CLINIC | Age: 78
End: 2023-10-09
Payer: MEDICARE

## 2023-10-10 DIAGNOSIS — R30.0 DYSURIA: Primary | ICD-10-CM

## 2023-10-11 NOTE — TELEPHONE ENCOUNTER
Writer called patient due to her not reading Dr. Lake's mychart yet. Helped schedule appt for 10.13 to repeat urine.

## 2023-10-13 ENCOUNTER — LAB (OUTPATIENT)
Dept: LAB | Facility: CLINIC | Age: 78
End: 2023-10-13
Payer: MEDICARE

## 2023-10-13 ENCOUNTER — HOSPITAL ENCOUNTER (OUTPATIENT)
Dept: CARDIOLOGY | Facility: CLINIC | Age: 78
Discharge: HOME | End: 2023-10-13
Attending: INTERNAL MEDICINE | Admitting: INTERNAL MEDICINE
Payer: MEDICARE

## 2023-10-13 DIAGNOSIS — R30.0 DYSURIA: ICD-10-CM

## 2023-10-13 DIAGNOSIS — R06.02 SOB (SHORTNESS OF BREATH): ICD-10-CM

## 2023-10-13 LAB
ALBUMIN UR-MCNC: 30 MG/DL
APPEARANCE UR: CLEAR
BACTERIA #/AREA URNS HPF: ABNORMAL /HPF
BILIRUB UR QL STRIP: NEGATIVE
COLOR UR AUTO: YELLOW
GLUCOSE UR STRIP-MCNC: NEGATIVE MG/DL
HGB UR QL STRIP: ABNORMAL
KETONES UR STRIP-MCNC: NEGATIVE MG/DL
LEUKOCYTE ESTERASE UR QL STRIP: ABNORMAL
LVEF ECHO: NORMAL
NITRATE UR QL: NEGATIVE
PH UR STRIP: 7 [PH] (ref 5–7)
RBC #/AREA URNS AUTO: ABNORMAL /HPF
SP GR UR STRIP: 1.01 (ref 1–1.03)
SQUAMOUS #/AREA URNS AUTO: ABNORMAL /LPF
UROBILINOGEN UR STRIP-ACNC: 0.2 E.U./DL
WBC #/AREA URNS AUTO: ABNORMAL /HPF
WBC CLUMPS #/AREA URNS HPF: PRESENT /HPF

## 2023-10-13 PROCEDURE — 93306 TTE W/DOPPLER COMPLETE: CPT | Mod: 26 | Performed by: INTERNAL MEDICINE

## 2023-10-13 PROCEDURE — 87086 URINE CULTURE/COLONY COUNT: CPT

## 2023-10-13 PROCEDURE — 999N000208 ECHOCARDIOGRAM COMPLETE

## 2023-10-13 PROCEDURE — 81001 URINALYSIS AUTO W/SCOPE: CPT

## 2023-10-15 LAB — BACTERIA UR CULT: NORMAL

## 2023-10-29 ENCOUNTER — HEALTH MAINTENANCE LETTER (OUTPATIENT)
Age: 78
End: 2023-10-29

## 2023-12-06 ENCOUNTER — OFFICE VISIT (OUTPATIENT)
Dept: NEPHROLOGY | Facility: CLINIC | Age: 78
End: 2023-12-06
Payer: MEDICARE

## 2023-12-06 VITALS
DIASTOLIC BLOOD PRESSURE: 76 MMHG | WEIGHT: 190 LBS | SYSTOLIC BLOOD PRESSURE: 145 MMHG | OXYGEN SATURATION: 96 % | HEART RATE: 66 BPM | BODY MASS INDEX: 34.74 KG/M2

## 2023-12-06 DIAGNOSIS — I1A.0 RESISTANT HYPERTENSION: Primary | ICD-10-CM

## 2023-12-06 PROCEDURE — 99214 OFFICE O/P EST MOD 30 MIN: CPT | Performed by: INTERNAL MEDICINE

## 2023-12-06 ASSESSMENT — PAIN SCALES - GENERAL: PAINLEVEL: NO PAIN (0)

## 2023-12-06 NOTE — PROGRESS NOTES
Nephrology Clinic    Carmen Bonner MRN:[id number] YOB: 1945  Date of Service: 12/06/2023  Primary care provider: Maribell Castaneda  Requesting physician: Maribell Castaneda      REASON FOR CONSULT: Hypertension    HISTORY OF PRESENT ILLNESS:   Carmen Bonner is a 77 year old female who first presented for evaluation of hypertension in October 2023.  The past medical history is significant for obesity, asthma and hypertension for the past 20 years.   For her hypertension she was on clonidine 0.2 mg twice daily and lisinopril 40 mg daily. She was also supposed to be on carvedilol but she hasn't been taking it as it has been giving her lower extremity edema. She has also tried in the past amlodipine 10 mg daily but stopped it for the same issue. In addition, hydrochlorothiazide was prescribed in the past but was stopped because of hyponatremia. She reported that her BP is reasonably well controlled at home. She is being referred because she was noticed on recent labs done in May 2023 to have an elevated renin aldosterone ratio at 30.8 (aldosterone level 12.3). Her weight has been stable and her BMI is 33.72.  Her kidney function is intact with a creatinine level at 0.77 mg/dL on 10/02 and she has no evidence of albuminuria with a negative uACR on 10/02.   Renal imaging done in March 2023 to rule out renal artery stenosis shows a right renal cyst only. At her first visit, I decreased the clonidine dose to 0.1 mg twice daily and added concomitantly a small dose of metoprolol succinate at 25 mg and of amlodipine at 2.5 mg. As a result her blood pressure is now well controlled in the morning but remains elevated in the evening.    EXAM: US RENAL COMPLETE WITH ARTERIAL DUPLEX      INDICATION: Resistant hypertension, evaluate for renal artery stenosis     COMPARISON: No pertinent comparison study is available for review.      TECHNIQUE: Real-time gray-scale sonographic imaging of the kidneys was  performed  including duplex spectral and color Doppler evaluation of  the renal arteries.     RENAL ULTRASOUND FINDINGS:    RIGHT KIDNEY: Normal parenchymal echogenicity, without hydronephrosis,  measuring approximately 9.8 x 5.3 x 3.9 cm. There is a cyst in the  right lateral kidney.     LEFT KIDNEY: Normal parenchymal echogenicity, without hydronephrosis,  measuring approximately 10.7 x 4.9 x 5.4 cm.     Bladder:The bladder appears unremarkable     DUPLEX ARTERIAL ULTRASOUND FINDINGS:  Systolic Velocities (cm/s):     RIGHT RENAL ARTERY:  Proximal: 125                Mid: 125                 Distal: 109                    LEFT RENAL ARTERY:  Proximal: Not visualized due to overlying bowel gas                Mid: 189                 Distal:  80                    AORTA: 110     Arcuate arterial resistive indices range from 0.80-0.82 on the right  and 0.80-0.82 on the left.                                                                      IMPRESSION:  1.  Origin of the left renal artery not visualized. Further evaluation  could be performed with a CT angiogram.  2.  Per sonographic velocity criteria, no evidence for significant  right renal artery stenosis.     Renal artery stenosis parameters:     increased peak systolic velocity (PSV): some advocate 180 cm/s      increased renal-aortic ratio (RAR), i.e. PSVrenal/PSVaorta: usually  taken as >3.5     turbulent flow in a post-stenotic area     pulsus parvus et tardus waveform (slow-rising) due to stenosis     decreased (interlobar) renal arterial resistive index (RI): <0.55 in  severe stenosis      TIFF POLLOCK MD     The following portions of the patient's history were reviewed and updated as appropriate: allergies, current medications, past family history, past medical history, past social history, past surgical history and problem list.    PAST MEDICAL HISTORY:  Past Medical History:   Diagnosis Date    Allergic rhinitis     Anxiety     on seroquel    Asthma      Cervicalgia     Chronic pain     Depressive disorder 11/1/1973    Diabetes (H)     now diet controlled    DJD (degenerative joint disease) of lumbar spine     CHRONIC HAS SEEN PAIN CLINIC- put on tizanidine     Endometriosis     Finger pain     Finger swelling     Flank pain     GERD (gastroesophageal reflux disease)     Hematuria     Herpes     Hypertension     Hyponatremia     Insomnia     on seroquel    Insomnia     Knee pain     Mumps     Purpura (H24)     SAW DERM     Recurrent UTI     Skin cancer     basal cell     Stress incontinence     Upper back pain      PAST SURGICAL HISTORY:  Past Surgical History:   Procedure Laterality Date    ABDOMEN SURGERY  1993,1973    APPENDECTOMY  1993    CHOLECYSTECTOMY      COLONOSCOPY N/A 04/28/2022    Procedure: COLONOSCOPY;  Surgeon: Dimitry Pearl MD;  Location:  GI    CYSTOSCOPY      ESOPHAGOSCOPY, GASTROSCOPY, DUODENOSCOPY (EGD), COMBINED N/A 04/28/2022    Procedure: Esophagoscopy, gastroscopy, duodenoscopy (EGD), combined;  Surgeon: Dimitry Pearl MD;  Location:  GI    HYSTERECTOMY TOTAL ABDOMINAL       MEDICATIONS:  Prescription Medications as of 12/6/2023         Rx Number Disp Refills Start End Last Dispensed Date Next Fill Date Owning Pharmacy    acetaminophen (TYLENOL) 500 MG tablet            Sig: Take 500-1,000 mg by mouth every 6 hours as needed for mild pain    Class: Historical    Route: Oral    albuterol (PROAIR HFA) 108 (90 Base) MCG/ACT inhaler  18 g 1 3/20/2023    [facility] - [address]    Sig: Inhale 2 puffs into the lungs every 4 hours as needed for shortness of breath or wheezing    Class: E-Prescribe    Notes to Pharmacy: Pharmacy may dispense brand covered by insurance (Proair, or proventil or ventolin or generic albuterol inhaler)Profile Rx: patient will contact pharmacy when needed    Route: Inhalation    amLODIPine (NORVASC) 2.5 MG tablet  90 tablet 1 10/4/2023 4/1/2024   [facility]   [address] [facility]    Sig: Take 1 tablet (2.5 mg) by mouth daily for 180 days    Class: E-Prescribe    Route: Oral    cholecalciferol 25 MCG (1000 UT) CHEW            Sig: Take 1 chew tab by mouth daily    Class: Historical    Route: Oral    cloNIDine (CATAPRES) 0.1 MG tablet  180 tablet 3 10/4/2023 9/28/2024   [id number] - [place], [place] - [address]    Sig: Take 1 tablet (0.1 mg) by mouth 2 times daily for 360 days    Class: E-Prescribe    Route: Oral    fluconazole (DIFLUCAN) 150 MG tablet  3 tablet 0 9/14/2022    [id number] - [place], [place] - [address] [facility]    Sig: Take 1 tablet (150 mg) by mouth once as needed    Class: E-Prescribe    Route: Oral    fluticasone-salmeterol (ADVAIR) 250-50 MCG/ACT inhaler  60 each 3 9/26/2023    [facility] [place] - [address] [facility]    Sig: Inhale 1 puff into the lungs every 12 hours    Class: E-Prescribe    Route: Inhalation    LANsoprazole (PREVACID) 30 MG DR capsule    9/15/2022    [id number] [place] - [address]    Sig: Take 1 capsule (30 mg) by mouth daily    Class: Historical    Route: Oral    lisinopril (ZESTRIL) 40 MG tablet  90 tablet 3 2/7/2023    [id number] [place] - [address] [facility]    Sig: TAKE 1 TABLET (40 MG) BY MOUTH DAILY FOR BLOOD PRESSURE    Class: E-Prescribe    Route: Oral    metoprolol succinate ER (TOPROL XL) 25 MG 24 hr tablet  90 tablet 1 10/4/2023 4/1/2024   [id number] - [place], [place] - [address]    Sig: Take 1 tablet (25 mg) by mouth daily for 180 days    Class: E-Prescribe    Route: Oral    montelukast (SINGULAIR) 10 MG tablet  90 tablet 3 9/11/2023    [id number] [place]  [address]    Sig: TAKE 1 TABLET BY MOUTH EVERYDAY AT BEDTIME    Class: E-Prescribe    nystatin (MYCOSTATIN) 277586 UNIT/GM external powder  60 g 0 4/25/2022    [id number] - [place] - [address] [facility]    Sig: Apply topically 2 times daily    Class: E-Prescribe    Route: Topical     QUEtiapine (SEROQUEL) 100 MG tablet  135 tablet 3 3/20/2023    [id number] - [place] [place] - [address] [facility]    Sig: Take 1.5 tablets (150 mg) by mouth At Bedtime for sleep    Class: E-Prescribe    Route: Oral    rosuvastatin (CRESTOR) 5 MG tablet  90 tablet 3 3/20/2023    [id number] - [place] - [address] [facility]    Sig: Take 1 tablet (5 mg) by mouth daily for cholestrol    Class: E-Prescribe    Route: Oral    tiZANidine (ZANAFLEX) 2 MG tablet  180 tablet 1 3/20/2023    [id number] - [place] - [address] [facility]    Sig: TAKE 1-2 TABLETS BY MOUTH DAILY AS NEEDED FOR MUSCLE SPASMS    Class: E-Prescribe    triamcinolone (KENALOG) 0.1 % cream  45 g 1 4/17/2015    [id number] - [place] [place] - [address] [facility]    Sig: Apply topically 2 times daily    Class: Historical    Route: Topical    valACYclovir (VALTREX) 1000 mg tablet  12 tablet 1 4/30/2018    [id number] - [place] - [address] [facility]    Sig: Take 1 tablet (1,000 mg) by mouth as needed Herpes Outbreak    Class: E-Prescribe    Route: Oral    conjugated estrogens (PREMARIN) 0.625 MG/GM vaginal cream  30 g 1 1/31/2022    [id number] - [place] [place] - [address] [facility]    Sig: Place 1 g vaginally twice a week    Class: E-Prescribe    Route: Vaginal          Clinic-Administered Medications as of 12/6/2023         Dose Frequency Start End    denosumab (PROLIA) injection 60 mg 60 mg EVERY 6 MONTHS 2/2/2023     Route: Subcutaneous           ALLERGIES:    Allergies   Allergen Reactions    Boniva [Ibandronic Acid]      Intolerance any stomach upset    Fosamax [Alendronate]      Stomach upset    Hctz [Hydrochlorothiazide] Other (See Comments)     Low sodium    Lexapro [Escitalopram] Nausea    Omeprazole Diarrhea    Sulfa Antibiotics Itching    Venlafaxine Nausea    Amlodipine Swelling     swelling of foot    Reclast [Zoledronic Acid]      Intolerance body aches     REVIEW OF SYSTEMS:  Review Of Systems  Skin: negative for, pigmentation,  acne, rash, scaling, itching, bruising, lumps or bumps, hair changes, nail changes  Eyes: negative for, visual blurring, double vision, glaucoma, cataracts, eye pain, color blindness, glasses, contacts, photophobia, redness, tearing  Ears/Nose/Throat: negative for, nasal congestion, purulent rhinorrhea, sneezing, postnasal drainage, hearing loss, deafness, tinnitus, vertigo, epistaxis, deviated septum, frequent URI's, sinus trouble  Respiratory: No shortness of breath, dyspnea on exertion, cough, or hemoptysis  Cardiovascular: negative for, palpitations, tachycardia, irregular heart beat, chest pain, exertional chest pain or pressure, paroxysmal nocturnal dyspnea, dyspnea on exertion, orthopnea, and lower extremity edema  Gastrointestinal: negative for, poor appetite, dysphagia, nausea, vomiting, heartburn, dyspepsia, reflux, hematemesis, abdominal pain, and excessive gas or bloating  Genitourinary: negative for, nocturia, dysuria, frequency, urgency, hesitancy, hematuria, retention, decreased urinary stream, incontinence, and kidney stone  Musculoskeletal: negative for, fracture, back pain, neck pain, arthritis, joint pain, joint swelling, joint stiffness, gout, and fibromyalgia  Neurologic: negative for, headaches, syncope, stroke, seizures, paralysis, local weakness, numbness or tingling of hands, numbness or tingling of feet, and involuntary movements    A comprehensive review of systems was performed and found to be negative except as described here or above.  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 No   Social History Narrative    Health maintenance             [employer] DIRECTIVE [employer]     COLONOSCOPY     DEXA     ANNUAL WELLNESS VISIT    ASTHMA CONTROL TEST/ ACTION PLAN             LOOSE STOOLS       Duration: frequent looser stools / gurgling noises        No improvement with the fodmaps diet        Better off nexium/         Now on prevacid          Associated flank pain: None      Intensity:  moderate      Accompanying signs and symptoms:         Fever/Chills: no         Gas/Bloating: YES         Nausea/vomitting: no         Diarrhea: YES         Dysuria or Hematuria: no      Now regulating it with diet      Precipitating or alleviating factors:         Pain worse with eating/BM/urination: yes            Therapies tried and outcome: fodmap diet        LMP:  not applicable                         Back Pain            Duration:      HAS CHRONIC LOW BACK PAIN HAS BEEN TO PAIN CLINIC  WAS PUT ON TIZANIDINE  DOES REGULAR BACK EXERCISES  Takes up to 2  A day of tizanidine with tylenol MRI - showed mild to moderate degenerative changes        NEW PAIN UPPER BACK  Had it a few time over the last 2-3 years        3 months, worsening with yoga         Specific cause: none      Description (location/character/radiation): mid back        Intensity:  moderate, severe SHARP PAIN  SAME ON BOTH SIDE        Accompanying signs and symptoms (weakness/fever/urinary symptoms): none      History (similar episodes/surgery/injury):        Precipitating or " alleviating factors: YOGA        Therapies tried and outcome: exercise, tylenol       Social Determinants of Health     Financial Resource Strain: Low Risk  (9/24/2023)    Financial Resource Strain     Within the past 12 months, have you or your family members you live with been unable to get utilities (heat, electricity) when it was really needed?: No   Food Insecurity: Low Risk  (9/24/2023)    Food Insecurity     Within the past 12 months, did you worry that your food would run out before you got money to buy more?: No     Within the past 12 months, did the food you bought just not last and you didn t have money to get more?: No   Transportation Needs: Low Risk  (9/24/2023)    Transportation Needs     Within the past 12 months, has lack of transportation kept you from medical appointments, getting your medicines, non-medical meetings or appointments, work, or from getting things that you need?: No   Physical Activity: Not on file   Stress: Not on file   Social Connections: Not on file   Interpersonal Safety: Low Risk  (9/24/2023)    Interpersonal Safety     Do you feel physically and emotionally safe where you currently live?: Yes     Within the past 12 months, have you been hit, slapped, kicked or otherwise physically hurt by someone?: No     Within the past 12 months, have you been humiliated or emotionally abused in other ways by your partner or ex-partner?: No   Housing Stability: Low Risk  (9/24/2023)    Housing Stability     Do you have housing? : Yes     Are you worried about losing your housing?: No     FAMILY MEDICAL HISTORY:   Family History   Problem Relation Age of Onset    Cancer Mother     Osteoporosis Mother     Cerebrovascular Disease Father     Alzheimer Disease Father     Genitourinary Problems Maternal Grandmother     Heart Disease Paternal Grandfather     Osteoporosis Sister     Arthritis Sister     Breast Cancer Paternal Grandmother     Colon Cancer No family hx of      PHYSICAL EXAM:   BP (!)  145/76 (BP Location: Left arm, Patient Position: Sitting, Cuff Size: Adult Large)   Pulse 66   Wt 86.2 kg (190 lb)   SpO2 96%   BMI 34.74 kg/m    GENERAL APPEARANCE: alert and no distress  EYES: nonicteric  HENT: mouth without ulcers or lesions  NECK: supple, no adenopathy  RESP: lungs clear to auscultation   CV: regular rhythm, normal rate, no rub  ABDOMEN: soft, nontender, normal bowel sounds, no HSM   Extremities: no clubbing, cyanosis, or edema  MS: no evidence of inflammation in joints, no muscle tenderness  SKIN: no rash  NEURO: mentation intact and speech normal  PSYCH: affect normal/bright   LABS:   No results found for this or any previous visit (from the past 672 hour(s)).    CMP  Recent Labs   Lab Test 10/02/23  1442 05/23/23  1541 03/17/23  1504 09/15/22  1359 11/05/21  1535 06/25/21  1534 04/22/21  1514 02/04/21  1525 08/07/20  1528 07/29/20  1357 07/29/19  1625 10/31/18  2257 01/17/17  1047 04/19/16  1450    139 141 136   < > 136 135 135 132* 126*   < > 136   < > 136   POTASSIUM 4.6 4.4 4.6 4.2   < > 4.5 4.2 4.3 4.3 4.2   < > 3.4   < > 4.1   CHLORIDE 104 104 106 103   < > 103 104 103 98 92*   < > 103   < > 101   CO2 25 25 26 28   < > 29 30 24 24 25   < > 26   < > 24   ANIONGAP 10 10 9 5   < > 4 1* 8 10 9   < > 7   < > 11   * 103* 126* 95   < > 107* 96 106* 105* 97   < > 118*   < > 84   BUN 16.0 18.0 16.9 20   < > 13 18 19 18 22   < > 18   < > 15   CR 0.77 0.81 0.76 0.75   < > 0.76 0.81 0.80 0.79 0.88   < > 0.71   < > 0.66   GFRESTIMATED 78 73 79 81   < > 75 70 72 73 64   < > 80   < > 88   GFRESTBLACK  --   --   --   --   --  87 81 83 84 74   < > >90   < > >90  African American GFR Calc     FABY 9.5 9.4 12.5* 9.6   < > 9.4 9.2 9.8 9.2 9.2   < > 9.3   < > 9.4   MAG  --   --   --   --   --   --   --   --   --   --   --  2.3  --   --    PROTTOTAL  --   --  7.2 7.8  --   --   --   --   --  8.2  --   --   --  8.0   ALBUMIN  --   --  4.3 4.0  --   --   --   --   --  4.0  --   --   --  4.3    BILITOTAL  --   --  0.3 0.4  --   --   --   --   --  0.4  --   --   --  0.4   ALKPHOS  --   --  62 63  --   --   --   --   --  67  --   --   --  52   AST  --   --  20 16  --   --   --   --   --  15  --   --   --  13   ALT  --   --  14 20  --   --  27  --   --  19  --   --   --  25    < > = values in this interval not displayed.     CBC  Recent Labs   Lab Test 10/02/23  1442 09/15/22  1359 11/05/21  1535 06/25/21  1534   HGB 12.6 13.2 12.9 13.4   WBC 4.6 6.0 5.9 5.8   RBC 4.58 4.76 4.44 4.88   HCT 40.3 40.9 38.8 40.9   MCV 88 86 87 84   MCH 27.5 27.7 29.1 27.5   MCHC 31.3* 32.3 33.2 32.8   RDW 12.6 12.2 13.9 13.8    183 192 199     INRNo lab results found.  ABGNo lab results found.   URINE STUDIES  Recent Labs   Lab Test 10/13/23  1514 10/02/23  1447 05/26/23  1410 09/26/22  1921 03/31/22  1557   COLOR Yellow Yellow Yellow  --  Yellow   APPEARANCE Clear Clear Clear  --  Clear   URINEGLC Negative Negative Negative  --  Negative   URINEBILI Negative Negative Negative  --  Negative   URINEKETONE Negative Negative Negative  --  Negative   SG 1.015 1.015 1.020  --  1.015   UBLD Trace* Trace* Negative  --  Trace*   URINEPH 7.0 7.0 5.5  --  7.0   PROTEIN 30* Negative Negative  --  Negative   UROBILINOGEN 0.2 1.0 0.2  --  0.2   NITRITE Negative Negative Negative  --  Negative   LEUKEST Small* Small* Small*  --  Trace*   RBCU 2-5* 2-5* 2-5* 2-5*  --    WBCU 5-10* 5-10* 5-10* 0-5  --      No lab results found.    ASSESSMENT AND PLAN:   #HTN  Longstanding and pseudoresistant given the sub optimal regimen. Also primary hyperaldosteronism is highly unlikely in this patient  as the use of lisinopril could falsely elevate the ratio and also the aldosterone level is less than 15. Her hypertension is driven to a certain extent by obesity and lifestyle. Given the short life of clonidine and the rebound hypertension that one would get, I decreased the dose to 0.1 mg twice daily and added concomitantly a small dose of  metoprolol succinate at 25 mg and of amlodipine at 2.5 mg. However as her blood pressure is elevated in the evening, I will add a dose of clonidine 0.1 mg at noon time. The patient was instructed to keep a BP diary and to communicate the results. She was also instructed to keep the sodium intake at 2 g/day, to lose weight, to exercise and to avoid NSAIDs    #Asthma/COPD    On montelukast and fluticasone/salmeterol    #Blood count  Hemoglobin 12.6 no acute issue    #Acid-base status  CO2 level 25 no acute issue    #Electrolytes  Na 139  K 4.6 no acute issue     #BMD  Ca    9.5      alb 4.3  Vitamin D level 28 patient on cholecalciferol and also getting denosumab for osteopenia, will order a phosphorus level    The total time of this encounter amounted to 30 minutes on the day of the encounter. This time included time spent with the patient, reviewing records, ordering tests, and performing post visit documentation.     The patient will return to follow up as needed    Apolonia Lake MD  Division of Renal Disease and Hypertension

## 2024-01-25 ASSESSMENT — ASTHMA QUESTIONNAIRES
QUESTION_5 LAST FOUR WEEKS HOW WOULD YOU RATE YOUR ASTHMA CONTROL: WELL CONTROLLED
QUESTION_1 LAST FOUR WEEKS HOW MUCH OF THE TIME DID YOUR ASTHMA KEEP YOU FROM GETTING AS MUCH DONE AT WORK, SCHOOL OR AT HOME: A LITTLE OF THE TIME
QUESTION_2 LAST FOUR WEEKS HOW OFTEN HAVE YOU HAD SHORTNESS OF BREATH: ONCE OR TWICE A WEEK
ACT_TOTALSCORE: 21
QUESTION_3 LAST FOUR WEEKS HOW OFTEN DID YOUR ASTHMA SYMPTOMS (WHEEZING, COUGHING, SHORTNESS OF BREATH, CHEST TIGHTNESS OR PAIN) WAKE YOU UP AT NIGHT OR EARLIER THAN USUAL IN THE MORNING: NOT AT ALL
QUESTION_4 LAST FOUR WEEKS HOW OFTEN HAVE YOU USED YOUR RESCUE INHALER OR NEBULIZER MEDICATION (SUCH AS ALBUTEROL): ONCE A WEEK OR LESS

## 2024-01-29 ENCOUNTER — OFFICE VISIT (OUTPATIENT)
Dept: FAMILY MEDICINE | Facility: CLINIC | Age: 79
End: 2024-01-29
Payer: MEDICARE

## 2024-01-29 VITALS
HEIGHT: 62 IN | TEMPERATURE: 97.9 F | WEIGHT: 192.7 LBS | SYSTOLIC BLOOD PRESSURE: 144 MMHG | HEART RATE: 60 BPM | DIASTOLIC BLOOD PRESSURE: 70 MMHG | RESPIRATION RATE: 16 BRPM | OXYGEN SATURATION: 97 % | BODY MASS INDEX: 35.46 KG/M2

## 2024-01-29 DIAGNOSIS — Z13.29 SCREENING FOR THYROID DISORDER: ICD-10-CM

## 2024-01-29 DIAGNOSIS — Z79.899 MEDICATION MANAGEMENT: ICD-10-CM

## 2024-01-29 DIAGNOSIS — J45.20 INTERMITTENT ASTHMA, UNCOMPLICATED: ICD-10-CM

## 2024-01-29 DIAGNOSIS — F51.01 PRIMARY INSOMNIA: ICD-10-CM

## 2024-01-29 DIAGNOSIS — Z00.00 ENCOUNTER FOR MEDICARE ANNUAL WELLNESS EXAM: Primary | ICD-10-CM

## 2024-01-29 DIAGNOSIS — M85.80 OSTEOPENIA WITH HIGH RISK OF FRACTURE: ICD-10-CM

## 2024-01-29 DIAGNOSIS — Z13.0 SCREENING FOR DEFICIENCY ANEMIA: ICD-10-CM

## 2024-01-29 DIAGNOSIS — Z92.29 HISTORY OF BISPHOSPHONATE THERAPY: ICD-10-CM

## 2024-01-29 DIAGNOSIS — I10 ESSENTIAL HYPERTENSION: ICD-10-CM

## 2024-01-29 DIAGNOSIS — E66.01 MORBID OBESITY (H): ICD-10-CM

## 2024-01-29 DIAGNOSIS — E78.5 HYPERLIPIDEMIA, UNSPECIFIED HYPERLIPIDEMIA TYPE: ICD-10-CM

## 2024-01-29 DIAGNOSIS — R73.03 PREDIABETES: ICD-10-CM

## 2024-01-29 DIAGNOSIS — I1A.0 RESISTANT HYPERTENSION: ICD-10-CM

## 2024-01-29 LAB
ERYTHROCYTE [DISTWIDTH] IN BLOOD BY AUTOMATED COUNT: 12.8 % (ref 10–15)
HBA1C MFR BLD: 6.4 % (ref 0–5.6)
HCT VFR BLD AUTO: 40.3 % (ref 35–47)
HGB BLD-MCNC: 12.9 G/DL (ref 11.7–15.7)
MCH RBC QN AUTO: 28 PG (ref 26.5–33)
MCHC RBC AUTO-ENTMCNC: 32 G/DL (ref 31.5–36.5)
MCV RBC AUTO: 87 FL (ref 78–100)
PLATELET # BLD AUTO: 196 10E3/UL (ref 150–450)
RBC # BLD AUTO: 4.61 10E6/UL (ref 3.8–5.2)
WBC # BLD AUTO: 7 10E3/UL (ref 4–11)

## 2024-01-29 PROCEDURE — 85027 COMPLETE CBC AUTOMATED: CPT | Performed by: INTERNAL MEDICINE

## 2024-01-29 PROCEDURE — 99214 OFFICE O/P EST MOD 30 MIN: CPT | Mod: 25 | Performed by: INTERNAL MEDICINE

## 2024-01-29 PROCEDURE — 83036 HEMOGLOBIN GLYCOSYLATED A1C: CPT | Performed by: INTERNAL MEDICINE

## 2024-01-29 PROCEDURE — G0439 PPPS, SUBSEQ VISIT: HCPCS | Performed by: INTERNAL MEDICINE

## 2024-01-29 PROCEDURE — 80061 LIPID PANEL: CPT | Performed by: INTERNAL MEDICINE

## 2024-01-29 PROCEDURE — 36415 COLL VENOUS BLD VENIPUNCTURE: CPT | Performed by: INTERNAL MEDICINE

## 2024-01-29 PROCEDURE — 80053 COMPREHEN METABOLIC PANEL: CPT | Performed by: INTERNAL MEDICINE

## 2024-01-29 PROCEDURE — 84443 ASSAY THYROID STIM HORMONE: CPT | Performed by: INTERNAL MEDICINE

## 2024-01-29 RX ORDER — METOPROLOL SUCCINATE 25 MG/1
25 TABLET, EXTENDED RELEASE ORAL DAILY
Qty: 90 TABLET | Refills: 3 | Status: SHIPPED | OUTPATIENT
Start: 2024-01-29

## 2024-01-29 RX ORDER — QUETIAPINE FUMARATE 100 MG/1
150 TABLET, FILM COATED ORAL AT BEDTIME
Qty: 135 TABLET | Refills: 3 | Status: SHIPPED | OUTPATIENT
Start: 2024-01-29

## 2024-01-29 RX ORDER — ROSUVASTATIN CALCIUM 5 MG/1
5 TABLET, COATED ORAL DAILY
Qty: 90 TABLET | Refills: 3 | Status: SHIPPED | OUTPATIENT
Start: 2024-01-29

## 2024-01-29 RX ORDER — CLONIDINE HYDROCHLORIDE 0.1 MG/1
0.1 TABLET ORAL 3 TIMES DAILY
Qty: 270 TABLET | Refills: 3 | Status: SHIPPED | OUTPATIENT
Start: 2024-01-29

## 2024-01-29 RX ORDER — LISINOPRIL 40 MG/1
40 TABLET ORAL DAILY
Qty: 90 TABLET | Refills: 3 | Status: SHIPPED | OUTPATIENT
Start: 2024-01-29

## 2024-01-29 ASSESSMENT — ACTIVITIES OF DAILY LIVING (ADL): CURRENT_FUNCTION: NO ASSISTANCE NEEDED

## 2024-01-29 ASSESSMENT — ASTHMA QUESTIONNAIRES: ACT_TOTALSCORE: 21

## 2024-01-29 NOTE — PATIENT INSTRUCTIONS
"You are due for mammogram.  Please call the following number to make appointment :  [phone]  It is located in [address]    Monitor your blood pressure once a week  at home.  Bring those readings on your next visit.  Notify us if your blood pressure readings consistently stays greater than 140/90.       Follow up in 6 months.  Seek sooner medical attention if there is any worsening of symptoms or problems.     Patient Education   Personalized Prevention Plan  You are due for the preventive services outlined below.  Your care team is available to assist you in scheduling these services.  If you have already completed any of these items, please share that information with your care team to update in your medical record.  Health Maintenance Due   Topic Date Due    Asthma Action Plan - yearly  09/15/2023     Learning About Being Physically Active  What is physical activity?     Being physically active means doing any kind of activity that gets your body moving.  The types of physical activity that can help you get fit and stay healthy include:  Aerobic or \"cardio\" activities. These make your heart beat faster and make you breathe harder, such as brisk walking, riding a bike, or running. They strengthen your heart and lungs and build up your endurance.  Strength training activities. These make your muscles work against, or \"resist,\" something. Examples include lifting weights or doing push-ups. These activities help tone and strengthen your muscles and bones.  Stretches. These let you move your joints and muscles through their full range of motion. Stretching helps you be more flexible.  Reaching a balance between these three types of physical activity is important because each one contributes to your overall fitness.  What are the benefits of being active?  Being active is one of the best things you can do for your health. It helps you to:  Feel stronger and have more energy to do all the things you like to " "do.  Focus better at school or work.  Feel, think, and sleep better.  Reach and stay at a healthy weight.  Lose fat and build lean muscle.  Lower your risk for serious health problems, including diabetes, heart attack, high blood pressure, and some cancers.  Keep your heart, lungs, bones, muscles, and joints strong and healthy.  How can you make being active part of your life?  Start slowly. Make it your long-term goal to get at least 30 minutes of exercise on most days of the week. Walking is a good choice. You also may want to do other activities, such as running, swimming, cycling, or playing tennis or team sports.  Pick activities that you like--ones that make your heart beat faster, your muscles stronger, and your muscles and joints more flexible. If you find more than one thing you like doing, do them all. You don't have to do the same thing every day.  Get your heart pumping every day. Any activity that makes your heart beat faster and keeps it at that rate for a while counts.  Here are some great ways to get your heart beating faster:  Go for a brisk walk, run, or hike.  Go for a swim or bike ride.  Take an online exercise class or dance.  Play a game of touch football, basketball, or soccer.  Play tennis, pickleball, or racquetball.  Climb stairs.  Even some household chores can be aerobic. Just do them at a faster pace. Raking or mowing the lawn, sweeping the garage, and vacuuming and cleaning your home all can help get your heart rate up.  Strengthen your muscles during the week. You don't have to lift heavy weights or grow big, bulky muscles to get stronger. Doing a few simple activities that make your muscles work against, or \"resist,\" something can help you get stronger. Aim for at least twice a week.  For example, you can:  Do push-ups or sit-ups, which use your own body weight as resistance.  Lift weights or dumbbells or use stretch bands at home or in a gym or community center.  Stretch your muscles " "often. Stretching will help you as you become more active. It can help you stay flexible and loosen tight muscles. It can also help improve your balance and posture and can be a great way to relax.  Be sure to stretch the muscles you'll be using when you work out. It's best to warm your muscles slightly before you stretch them. Walk or do some other light aerobic activity for a few minutes. Then start stretching.  When you stretch your muscles:  Do it slowly. Stretching is not about going fast or making sudden movements.  Don't push or bounce during a stretch.  Hold each stretch for at least 15 to 30 seconds, if you can. You should feel a stretch in the muscle, but not pain.  Breathe out as you do the stretch. Then breathe in as you hold the stretch. Don't hold your breath.  If you're worried about how more activity might affect your health, have a checkup before you start. Follow any special advice your doctor gives you for getting a smart start.  Where can you learn more?  Go to https://www.exozet.Seiratherm/patiented  Enter W332 in the search box to learn more about \"Learning About Being Physically Active.\"  Current as of: June 6, 2023               Content Version: 13.8    7395-3939 [employer].   Care instructions adapted under license by your healthcare professional. If you have questions about a medical condition or this instruction, always ask your healthcare professional. [employer] disclaims any warranty or liability for your use of this information.      Learning About Dietary Guidelines  What are the Dietary Guidelines for Americans?     Dietary Guidelines for Americans provide tips for eating well and staying healthy. This helps reduce the risk for long-term (chronic) diseases.  These guidelines recommend that you:  Eat and drink the right amount for you. The U.[place] government's food guide is called MyPlate. It can help you make your own well-balanced eating plan.  Try to balance " "your eating with your activity. This helps you stay at a healthy weight.  Drink alcohol in moderation, if at all.  Limit foods high in salt, saturated fat, trans fat, and added sugar.  These guidelines are from the U.S. Department of Agriculture and the U.S. Department of Health and Human Services. They are updated every 5 years.  What is MyPlate?  MyPlate is the U.[place] government's food guide. It can help you make your own well-balanced eating plan. A balanced eating plan means that you eat enough, but not too much, and that your food gives you the nutrients you need to stay healthy.  MyPlate focuses on eating plenty of whole grains, fruits, and vegetables, and on limiting fat and sugar. It is available online at www.ChooseMyPlate.gov.  How can you get started?  If you're trying to eat healthier, you can slowly change your eating habits over time. You don't have to make big changes all at once. Start by adding one or two healthy foods to your meals each day.  Grains  Choose whole-grain breads and cereals and whole-wheat pasta and whole-grain crackers.  Vegetables  Eat a variety of vegetables every day. They have lots of nutrients and are part of a heart-healthy diet.  Fruits  Eat a variety of fruits every day. Fruits contain lots of nutrients. Choose fresh fruit instead of fruit juice.  Protein foods  Choose fish and lean poultry more often. Eat red meat and fried meats less often. Dried beans, tofu, and nuts are also good sources of protein.  Dairy  Choose low-fat or fat-free products from this food group. If you have problems digesting milk, try eating cheese or yogurt instead.  Fats and oils  Limit fats and oils if you're trying to cut calories. Choose healthy fats when you cook. These include canola oil and olive oil.  Where can you learn more?  Go to https://www.healthwise.net/patiented  Enter D676 in the search box to learn more about \"Learning About Dietary Guidelines.\"  Current as of: February 28, 2023        "        Content Version: 13.8    4181-8798 [employer].   Care instructions adapted under license by your healthcare professional. If you have questions about a medical condition or this instruction, always ask your healthcare professional. [employer] disclaims any warranty or liability for your use of this information.      Bladder Training: Care Instructions  Your Care Instructions     Bladder training is used to treat urge incontinence and stress incontinence. Urge incontinence means that the need to urinate comes on so fast that you can't get to a toilet in time. Stress incontinence means that you leak urine because of pressure on your bladder. For example, it may happen when you laugh, cough, or lift something heavy.  Bladder training can increase how long you can wait before you have to urinate. It can also help your bladder hold more urine. And it can give you better control over the urge to urinate.  It is important to remember that bladder training takes a few weeks to a few months to make a difference. You may not see results right away, but don't give up.  Follow-up care is a key part of your treatment and safety. Be sure to make and go to all appointments, and call your doctor if you are having problems. It's also a good idea to know your test results and keep a list of the medicines you take.  How can you care for yourself at home?  Work with your doctor to come up with a bladder training program that is right for you. You may use one or more of the following methods.  Delayed urination  In the beginning, try to keep from urinating for 5 minutes after you first feel the need to go.  While you wait, take deep, slow breaths to relax. Kegel exercises can also help you delay the need to go to the bathroom.  After some practice, when you can easily wait 5 minutes to urinate, try to wait 10 minutes before you urinate.  Slowly increase the waiting period until you are able to control  "when you have to urinate.  Scheduled urination  Empty your bladder when you first wake up in the morning.  Schedule times throughout the day when you will urinate.  Start by going to the bathroom every hour, even if you don't need to go.  Slowly increase the time between trips to the bathroom.  When you have found a schedule that works well for you, keep doing it.  If you wake up during the night and have to urinate, do it. Apply your schedule to waking hours only.  Kegel exercises  These tighten and strengthen pelvic muscles, which can help you control the flow of urine. (If doing these exercises causes pain, stop doing them and talk with your doctor.) To do Kegel exercises:  Squeeze your muscles as if you were trying not to pass gas. Or squeeze your muscles as if you were stopping the flow of urine. Your belly, legs, and buttocks shouldn't move.  Hold the squeeze for 3 seconds, then relax for 5 to 10 seconds.  Start with 3 seconds, then add 1 second each week until you are able to squeeze for 10 seconds.  Repeat the exercise 10 times a session. Do 3 to 8 sessions a day.  When should you call for help?  Watch closely for changes in your health, and be sure to contact your doctor if:    Your incontinence is getting worse.     You do not get better as expected.   Where can you learn more?  Go to https://www.[email or web address].net/patiented  Enter V684 in the search box to learn more about \"Bladder Training: Care Instructions.\"  Current as of: February 28, 2023               Content Version: 13.8    2015-5621 [employer].   Care instructions adapted under license by your healthcare professional. If you have questions about a medical condition or this instruction, always ask your healthcare professional. [employer] disclaims any warranty or liability for your use of this information.         "

## 2024-01-29 NOTE — PROGRESS NOTES
"Preventive Care Visit  [facility] MILO Castaneda MD, Internal Medicine  Jan 29, 2024      SUBJECTIVE:   Carmen is a 78 year old, presenting for the following:  Physical      Are you in the first 12 months of your Medicare coverage?  No    Healthy Habits:     In general, how would you rate your overall health?  Good    Frequency of exercise:  None    Do you usually eat at least 4 servings of fruit and vegetables a day, include whole grains    & fiber and avoid regularly eating high fat or \"junk\" foods?  No    Taking medications regularly:  Yes    Barriers to taking medications:  None    Medication side effects:  None    Ability to successfully perform activities of daily living:  No assistance needed    Home Safety:  No safety concerns identified    Hearing Impairment:  No hearing concerns    In the past 6 months, have you been bothered by leaking of urine? Yes    In general, how would you rate your overall mental or emotional health?  Good    Additional concerns today:  No    Have you ever done Advance Care Planning? (For example, a Health Directive, POLST, or a discussion with a medical provider or your loved ones about your wishes): No, advance care planning information given to patient to review.  Patient declined advance care planning discussion at this time.       Fall risk  Fallen 2 or more times in the past year?: No  Any fall with injury in the past year?: No    Cognitive Screening   1) Repeat 3 items (Leader, Season, Table)    2) Clock draw: NORMAL  3) 3 item recall: Recalls 3 objects  Results: 3 items recalled: COGNITIVE IMPAIRMENT LESS LIKELY    Mini-CogTM Copyright CODY Rahman. Licensed by the author for use in [facility]; reprinted with permission (gaye@.[facility]). All rights reserved.      Do you have sleep apnea, excessive snoring or daytime drowsiness? : no    Reviewed and updated as needed this visit by clinical staff   Tobacco  Allergies  Meds              Reviewed " and updated as needed this visit by Provider                  Social History     Tobacco Use    Smoking status: Former     Packs/day: 1.00     Years: 26.00     Additional pack years: 0.00     Total pack years: 26.00     Types: Cigarettes     Start date: 1962     Quit date: 1995     Years since quittin.0    Smokeless tobacco: Never   Substance Use Topics    Alcohol use: Not Currently     Comment: 26 yrs sober             2022     4:36 PM   Alcohol Use   Prescreen: >3 drinks/day or >7 drinks/week? No     Do you have a current opioid prescription? No  Do you use any other controlled substances or medications that are not prescribed by a provider? None              Current providers sharing in care for this patient include:   Patient Care Team:  Maribell Castaneda MD as PCP - General (Internal Medicine)  Maribell Castaneda MD as Assigned PCP  Sarah Herr PA-C as Physician Assistant (Urology)  Velasquez Gagnon DO as Assigned Musculoskeletal Provider  Apolonia Lake MD as MD (Nephrology)  Apolonia Lake MD as Assigned Nephrology Provider    The following health maintenance items are reviewed in Epic and correct as of today:  Health Maintenance   Topic Date Due    ASTHMA CONTROL TEST  2024    ANNUAL REVIEW OF HM ORDERS  2024    MEDICARE ANNUAL WELLNESS VISIT  2025    ASTHMA ACTION PLAN  2025    FALL RISK ASSESSMENT  2025    LIPID  2028    ADVANCE CARE PLANNING  2029    DTAP/TDAP/TD IMMUNIZATION (3 - Td or Tdap) 2032    COLORECTAL CANCER SCREENING  2032    DEXA  2037    PHQ-2 (once per calendar year)  Completed    INFLUENZA VACCINE  Completed    Pneumococcal Vaccine: 65+ Years  Completed    ZOSTER IMMUNIZATION  Completed    RSV VACCINE (Pregnancy & 60+)  Completed    COVID-19 Vaccine  Completed    IPV IMMUNIZATION  Aged Out    HPV IMMUNIZATION  Aged Out    MENINGITIS IMMUNIZATION  Aged Out    RSV MONOCLONAL ANTIBODY  Aged Out    MAMMO SCREENING   "Discontinued     Labs reviewed in [facility]  Mammogram Screening: Mammogram Screening - Patient over age 75, has elected to continue with screening.    Pertinent mammograms are reviewed under the imaging tab.  Review of Systems     Review of Systems  CONSTITUTIONAL: NEGATIVE for fever, chills, change in weight  INTEGUMENTARY/SKIN: NEGATIVE for worrisome rashes, moles or lesions  EYES: NEGATIVE for vision changes or irritation  ENT/MOUTH: NEGATIVE for ear, mouth and throat problems  RESP: NEGATIVE for significant cough or SOB  BREAST: NEGATIVE for masses, tenderness or discharge  CV: NEGATIVE for chest pain, palpitations or peripheral edema  GI: NEGATIVE for nausea, abdominal pain, heartburn, or change in bowel habits  : NEGATIVE for frequency, dysuria, or hematuria  MUSCULOSKELETAL: NEGATIVE for significant arthralgias or myalgia  NEURO: NEGATIVE for weakness, dizziness or paresthesias  ENDOCRINE: NEGATIVE for temperature intolerance, skin/hair changes  HEME: NEGATIVE for bleeding problems  PSYCHIATRIC: NEGATIVE for changes in mood or affect    OBJECTIVE:   BP (!) 144/70 (BP Location: Right arm, Patient Position: Sitting)   Pulse 60   Temp 97.9  F (36.6  C)   Resp 16   Ht 1.575 m (5' 2.01\")   Wt 87.4 kg (192 lb 11.2 oz)   SpO2 97%   BMI 35.23 kg/m     Estimated body mass index is 35.23 kg/m  as calculated from the following:    Height as of this encounter: 1.575 m (5' 2.01\").    Weight as of this encounter: 87.4 kg (192 lb 11.2 oz).  Physical Exam  GENERAL: alert and no distress  EYES: Eyes grossly normal to inspection, PERRL and conjunctivae and sclerae normal  HENT: ear canals and TM's normal, nose and mouth without ulcers or lesions  NECK: no adenopathy, no asymmetry, masses, or scars  RESP: lungs clear to auscultation - no rales, rhonchi or wheezes  BREAST: normal without masses, tenderness or nipple discharge and no palpable axillary masses or adenopathy  CV: regular rate and rhythm, normal S1 S2, no S3 or " S4, no murmur, click or rub, no peripheral edema  ABDOMEN: soft, nontender, no hepatosplenomegaly, no masses and bowel sounds normal  MS: no gross musculoskeletal defects noted, no edema  SKIN: no suspicious lesions or rashes  NEURO: Normal strength and tone, mentation intact and speech normal  PSYCH: mentation appears normal, affect normal/bright  LYMPH: no cervical, supraclavicular, axillary, or inguinal adenopathy    Labs pending    ASSESSMENT / PLAN:   Carmen was seen today for physical.    Diagnoses and all orders for this visit:    Encounter for Medicare annual wellness exam  Preventive health counseling was also done.  Last mammogram on 12/12/2022 and is due. Advised patient to schedule that.   Last colonoscopy on 04/28/2022  [facility] endoscopy on 04/28/2022    Resistant hypertension  Blood pressure on arrival elevated at 159/78. Rechecked blood pressure at  144/70. Yesterday home blood pressure at 143/64 and 112/62. She is on amlodipine 2.5 mg daily, lisinopril 40 mg daily, metoprolol 25 mg daily and clonidine 0.1 mg TID  -     cloNIDine (CATAPRES) 0.1 MG tablet; Take 1 tablet (0.1 mg) by mouth 3 times daily  -     metoprolol succinate ER (TOPROL XL) 25 MG 24 hr tablet; Take 1 tablet (25 mg) by mouth daily  Continue monitoring home blood pressure   She follows nephrology at speciality clinic.     Essential hypertension  -     lisinopril (ZESTRIL) 40 MG tablet; Take 1 tablet (40 mg) by mouth daily for Blood Pressure    Primary insomnia  -     QUEtiapine (SEROQUEL) 100 MG tablet; Take 1.5 tablets (150 mg) by mouth at bedtime for sleep  Doing well on medication     Hyperlipidemia, unspecified hyperlipidemia type  -     rosuvastatin (CRESTOR) 5 MG tablet; Take 1 tablet (5 mg) by mouth daily for cholestrol  -     Lipid panel reflex to direct LDL Non-fasting; Future  Low cholesterol, low fat diet    Screening for thyroid disorder  -     TSH with free T4 reflex; Future    Medication management  -     Comprehensive  "metabolic panel; Future    Screening for deficiency anemia  -     CBC with platelets; Future    Osteopenia with high risk of fracture  -     denosumab (PROLIA) injection 60 mg  Last DEXA on 12/12/2022  She was not able to tolerate oral bisphosphonate.   She has not started Prolia yet since her insurance did not approve it. I will prescribe again and if the insurance does not cover, advised patient to contact us.   Advised patient to supplements vitamin D/calcium    History of bisphosphonate therapy  -     denosumab (PROLIA) injection 60 mg  See note above    Prediabetes  -     Hemoglobin A1c; Future  Educated patient about the importance of healthy diet and physical activities      Morbid obesity (H)  She has gained weight of 10 lbs. Patient admits that she was not compliant with physical activities and now plans to start doing again.     Intermittent asthma, uncomplicated  Well controlled. Advair as needed and albuterol for flare episode.     Other orders  -     PRIMARY CARE FOLLOW-UP SCHEDULING; Future    Patient has been advised of split billing requirements and indicates understanding: Yes      Counseling  Reviewed preventive health counseling, as reflected in patient instructions  Special attention given to:       Regular exercise       Healthy diet/nutrition       Immunizations       Osteoporosis prevention/bone health       Colon cancer screening      BMI  Estimated body mass index is 35.23 kg/m  as calculated from the following:    Height as of this encounter: 1.575 m (5' 2.01\").    Weight as of this encounter: 87.4 kg (192 lb 11.2 oz).   Weight management plan: Discussed healthy diet and exercise guidelines      She reports that she quit smoking about 29 years ago. Her smoking use included cigarettes. She started smoking about 62 years ago. She has a 26 pack-year smoking history. She has never used smokeless tobacco.      Appropriate preventive services were discussed with this patient, including applicable " screening as appropriate for fall prevention, nutrition, physical activity, Tobacco-use cessation, weight loss and cognition.  Checklist reviewing preventive services available has been given to the patient.    Reviewed patients plan of care and provided an AVS. The Basic Care Plan (routine screening as documented in Health Maintenance) for Carmen meets the Care Plan requirement. This Care Plan has been established and reviewed with the Patient.      Signed Electronically by: Maribell Castaneda MD    This document serves as a record of the services and decisions personally performed and made by Dr. Castaneda. It was created on her behalf by aMmi luly, a trained medical scribe. The creation of this document is based the provider's statements to the medical scribe.      Identified Health Risks  I have reviewed Opioid Use Disorder and Substance Use Disorder risk factors and made any needed referrals.

## 2024-01-30 LAB
ALBUMIN SERPL BCG-MCNC: 4.5 G/DL (ref 3.5–5.2)
ALP SERPL-CCNC: 64 U/L (ref 40–150)
ALT SERPL W P-5'-P-CCNC: 15 U/L (ref 0–50)
ANION GAP SERPL CALCULATED.3IONS-SCNC: 11 MMOL/L (ref 7–15)
AST SERPL W P-5'-P-CCNC: 21 U/L (ref 0–45)
BILIRUB SERPL-MCNC: 0.4 MG/DL
BUN SERPL-MCNC: 16 MG/DL (ref 8–23)
CALCIUM SERPL-MCNC: 10 MG/DL (ref 8.8–10.2)
CHLORIDE SERPL-SCNC: 102 MMOL/L (ref 98–107)
CHOLEST SERPL-MCNC: 143 MG/DL
CREAT SERPL-MCNC: 0.8 MG/DL (ref 0.51–0.95)
DEPRECATED HCO3 PLAS-SCNC: 26 MMOL/L (ref 22–29)
EGFRCR SERPLBLD CKD-EPI 2021: 74 ML/MIN/1.73M2
FASTING STATUS PATIENT QL REPORTED: NO
GLUCOSE SERPL-MCNC: 94 MG/DL (ref 70–99)
HDLC SERPL-MCNC: 49 MG/DL
LDLC SERPL CALC-MCNC: 61 MG/DL
NONHDLC SERPL-MCNC: 94 MG/DL
POTASSIUM SERPL-SCNC: 4.4 MMOL/L (ref 3.4–5.3)
PROT SERPL-MCNC: 7.5 G/DL (ref 6.4–8.3)
SODIUM SERPL-SCNC: 139 MMOL/L (ref 135–145)
TRIGL SERPL-MCNC: 166 MG/DL
TSH SERPL DL<=0.005 MIU/L-ACNC: 2.08 UIU/ML (ref 0.3–4.2)

## 2024-01-31 NOTE — RESULT ENCOUNTER NOTE
Mary Morales    This is to inform you regarding your test result.    Your total cholesterol is normal.  HDL which is called good cholesterol is low.  Your LDL cholesterol is normal.  This is often call bad cholesterol and high levels increase the risk for heart attacks and strokes.  Eat low cholesterol low fat  diet and do regular physical activity.  HbA1c which is average glucose during last 3 months is 6.4%  You are very close to diabetes   It is very important that you watch your diet and do regular physical activity  The testing of your blood sugar, kidney function, liver function and electrolytes was normal.  CBC result which includes white count Hemoglobin and  Platelet Counts is normal.         Sincerely,      Dr.Nasima Tonya MD,FACP

## 2024-07-25 ASSESSMENT — ASTHMA QUESTIONNAIRES
QUESTION_2 LAST FOUR WEEKS HOW OFTEN HAVE YOU HAD SHORTNESS OF BREATH: THREE TO SIX TIMES A WEEK
QUESTION_1 LAST FOUR WEEKS HOW MUCH OF THE TIME DID YOUR ASTHMA KEEP YOU FROM GETTING AS MUCH DONE AT WORK, SCHOOL OR AT HOME: A LITTLE OF THE TIME
QUESTION_3 LAST FOUR WEEKS HOW OFTEN DID YOUR ASTHMA SYMPTOMS (WHEEZING, COUGHING, SHORTNESS OF BREATH, CHEST TIGHTNESS OR PAIN) WAKE YOU UP AT NIGHT OR EARLIER THAN USUAL IN THE MORNING: NOT AT ALL
QUESTION_4 LAST FOUR WEEKS HOW OFTEN HAVE YOU USED YOUR RESCUE INHALER OR NEBULIZER MEDICATION (SUCH AS ALBUTEROL): ONCE A WEEK OR LESS
ACT_TOTALSCORE: 19
QUESTION_5 LAST FOUR WEEKS HOW WOULD YOU RATE YOUR ASTHMA CONTROL: SOMEWHAT CONTROLLED
ACT_TOTALSCORE: 19

## 2024-07-30 ENCOUNTER — OFFICE VISIT (OUTPATIENT)
Dept: FAMILY MEDICINE | Facility: CLINIC | Age: 79
End: 2024-07-30
Payer: MEDICARE

## 2024-07-30 VITALS
RESPIRATION RATE: 18 BRPM | TEMPERATURE: 97 F | HEART RATE: 57 BPM | SYSTOLIC BLOOD PRESSURE: 139 MMHG | OXYGEN SATURATION: 96 % | WEIGHT: 187.7 LBS | BODY MASS INDEX: 34.32 KG/M2 | DIASTOLIC BLOOD PRESSURE: 70 MMHG

## 2024-07-30 DIAGNOSIS — J45.20 INTERMITTENT ASTHMA, UNCOMPLICATED: ICD-10-CM

## 2024-07-30 DIAGNOSIS — R73.03 PREDIABETES: ICD-10-CM

## 2024-07-30 DIAGNOSIS — I1A.0 RESISTANT HYPERTENSION: Primary | ICD-10-CM

## 2024-07-30 DIAGNOSIS — M54.9 UPPER BACK PAIN: ICD-10-CM

## 2024-07-30 PROCEDURE — 99214 OFFICE O/P EST MOD 30 MIN: CPT | Performed by: INTERNAL MEDICINE

## 2024-07-30 RX ORDER — METFORMIN HCL 500 MG
500 TABLET, EXTENDED RELEASE 24 HR ORAL
Qty: 90 TABLET | Refills: 1 | Status: SHIPPED | OUTPATIENT
Start: 2024-07-30

## 2024-07-30 RX ORDER — TIZANIDINE 2 MG/1
TABLET ORAL
Qty: 180 TABLET | Refills: 1 | Status: SHIPPED | OUTPATIENT
Start: 2024-07-30

## 2024-07-30 RX ORDER — MONTELUKAST SODIUM 10 MG/1
1 TABLET ORAL AT BEDTIME
Qty: 90 TABLET | Refills: 3 | Status: SHIPPED | OUTPATIENT
Start: 2024-07-30

## 2024-07-30 RX ORDER — FLUTICASONE PROPIONATE AND SALMETEROL 250; 50 UG/1; UG/1
1 POWDER RESPIRATORY (INHALATION) EVERY 12 HOURS
Qty: 60 EACH | Refills: 3 | Status: SHIPPED | OUTPATIENT
Start: 2024-07-30

## 2024-07-30 RX ORDER — AMLODIPINE BESYLATE 2.5 MG/1
2.5 TABLET ORAL DAILY
Qty: 90 TABLET | Refills: 3 | Status: SHIPPED | OUTPATIENT
Start: 2024-07-30

## 2024-07-30 ASSESSMENT — PAIN SCALES - GENERAL: PAINLEVEL: NO PAIN (0)

## 2024-07-30 NOTE — PROGRESS NOTES
"  Assessment & Plan     Resistant hypertension  Initial blood pressure high at 171/79. Home blood pressure readings can vary, per patient. One morning, it can read 136/58-59, others it can read 160~ then 140. Recheck blood pressure reading at 139/70.  Amlodipine 2.5 daily. Ran out a month ago. Will resume at the same dosage. If it doesn't work, she will message me via [employer] to let me know to increase it.   - amLODIPine (NORVASC) 2.5 MG tablet  Dispense: 90 tablet; Refill: 3  She follows nephrology at specialty clinic.  She is on clonidine and lisinipril  Has seen nephrologist also    Upper back pain  Refilled  - tiZANidine (ZANAFLEX) 2 MG tablet  Dispense: 180 tablet; Refill: 1    Intermittent asthma, uncomplicated  Refilled Albuterol. Take 2 puffs every four hours prn for SOB or wheezing.   - montelukast (SINGULAIR) 10 MG tablet  Dispense: 90 tablet; Refill: 3  - fluticasone-salmeterol (ADVAIR) 250-50 MCG/ACT inhaler  Dispense: 60 each; Refill: 3    Prediabetes  A1C was elevated at 6.4, very close to being diabetic. D/t high A1C, discussed metformin with patient. I will start her on lowest dosage. If she feels her stomach hasn't settled in a week, we will take her off. I advised her to avoid food high in sugar.   - metFORMIN (GLUCOPHAGE XR) 500 MG 24 hr tablet  Dispense: 90 tablet; Refill: 1    Other  She reports she broke a tooth over the weekend and needs to have oral surgery to get it out.   She has lost weight since I last saw her.       BMI  Estimated body mass index is 34.32 kg/m  as calculated from the following:    Height as of 1/29/24: 1.575 m (5' 2.01\").    Weight as of this encounter: 85.1 kg (187 lb 11.2 oz).   Weight management plan: She will be taking Metformin to help lower her blood sugar. Metformin also helps with weight loss.          Nehemiah Morales is a 78 year old, presenting for the following health issues:  Follow Up         No data to display              History of Present Illness "     Asthma:  She presents for follow up of asthma.  She has some cough, some wheezing, and some shortness of breath.  She is using a relief medication a few times a month. She typically misses taking her controller medication 2 time(s) per week. Patient is aware of the following triggers: pollens and smoke. The patient has not had a visit to the Emergency Room, Urgent Care or Hospital due to asthma since the last clinic visit.     Hypertension: She presents for follow up of hypertension.  She does check blood pressure  regularly outside of the clinic. Outside blood pressures have been over 140/90. She does not follow a low salt diet. She consumes 0 sweetened beverage(s) daily.She exercises with enough effort to increase her heart rate 9 or less minutes per day.  She exercises with enough effort to increase her heart rate 3 or less days per week.   She is taking medications regularly.           Review of Systems  Constitutional, HEENT, cardiovascular, pulmonary, GI, , musculoskeletal, neuro, skin, endocrine and psych systems are negative, except as otherwise noted.      Objective    There were no vitals taken for this visit.  There is no height or weight on file to calculate BMI.  Physical Exam   GENERAL: healthy, alert and no distress  PSYCH: mentation appears normal, affect normal/bright             Signed Electronically by: Maribell Castaneda MD    This document serves as a record of the services and decisions personally performed and made by Dr. Castaneda. It was created on her behalf by Laurie Nicolas, a trained medical scribe. The creation of this document is based the provider's statements to the medical scribe.

## 2024-07-30 NOTE — PATIENT INSTRUCTIONS
Monitor your blood pressure once a week  at home.  Bring those readings on your next visit.  Notify us if your blood pressure readings consistently stays greater than 140/90.       Having high blood pressure puts you at risk for heart attack, stroke, kidney damage, and other serious problems.   High blood pressure doesn't usually cause symptoms, so people sometimes don't take it seriously  The medicines your doctor or nurse prescribes to treat high blood pressure can help reduce the risk of these problems and even help you live longer.    You have a lot of control over your blood pressure. To lower it:  ?Lose weight (if you are overweight)  ?Choose a diet low in fat and rich in fruits, vegetables, and low-fat dairy products  ?Reduce the amount of salt you eat  ?Do something active for at least 30 minutes a day on most days of the week  ?Cut down on alcohol (if you drink more than 2 alcoholic drinks per day)      Benefits of regular physical activity  Reduces the risk of dying prematurely.  Reduces the risk of dying from heart disease.  Reduces the risk of stroke.  Reduces the risk of developing diabetes.  Reduces the risk of developing high blood pressure.  Helps reduce blood pressure in people who already have high blood pressure.  Reduces the risk of developing colon cancer.  Reduces feelings of depression and anxiety  Helps control weight.  Helps build and maintain healthy bones, muscles and joints.  Helps older adults become stronger and better able to move about without falling.  Promotes psychological well-being.        Start taking metformin 500 mg daily for prediabetese    Follow up in 3 months.  Seek sooner medical attention if there is any worsening of symptoms or problems.

## 2024-09-09 ENCOUNTER — VIRTUAL VISIT (OUTPATIENT)
Dept: FAMILY MEDICINE | Facility: CLINIC | Age: 79
End: 2024-09-09
Payer: MEDICARE

## 2024-09-09 DIAGNOSIS — N30.00 ACUTE CYSTITIS WITHOUT HEMATURIA: Primary | ICD-10-CM

## 2024-09-09 PROCEDURE — 99441 PR PHYSICIAN TELEPHONE EVALUATION 5-10 MIN: CPT | Mod: 93 | Performed by: INTERNAL MEDICINE

## 2024-09-09 RX ORDER — NITROFURANTOIN 25; 75 MG/1; MG/1
100 CAPSULE ORAL 2 TIMES DAILY
Qty: 10 CAPSULE | Refills: 0 | Status: SHIPPED | OUTPATIENT
Start: 2024-09-09

## 2024-09-09 ASSESSMENT — ASTHMA QUESTIONNAIRES
QUESTION_2 LAST FOUR WEEKS HOW OFTEN HAVE YOU HAD SHORTNESS OF BREATH: NOT AT ALL
QUESTION_3 LAST FOUR WEEKS HOW OFTEN DID YOUR ASTHMA SYMPTOMS (WHEEZING, COUGHING, SHORTNESS OF BREATH, CHEST TIGHTNESS OR PAIN) WAKE YOU UP AT NIGHT OR EARLIER THAN USUAL IN THE MORNING: NOT AT ALL
ACT_TOTALSCORE: 24
QUESTION_4 LAST FOUR WEEKS HOW OFTEN HAVE YOU USED YOUR RESCUE INHALER OR NEBULIZER MEDICATION (SUCH AS ALBUTEROL): NOT AT ALL
QUESTION_1 LAST FOUR WEEKS HOW MUCH OF THE TIME DID YOUR ASTHMA KEEP YOU FROM GETTING AS MUCH DONE AT WORK, SCHOOL OR AT HOME: NONE OF THE TIME
QUESTION_5 LAST FOUR WEEKS HOW WOULD YOU RATE YOUR ASTHMA CONTROL: WELL CONTROLLED
ACT_TOTALSCORE: 24

## 2024-09-09 NOTE — PROGRESS NOTES
"  If patient has telephone visit, have they been educated on video visit as preferred visit method and offered to change to video visit? NOT APPLICABLE        Instructions Relayed to Patient by Virtual Roomer:     Patient is active on [employer]:   Relayed following to patient: \"It looks like you are active on [employer], are you able to join the visit this way? If not, do you need us to send you a link now or would you like your provider to send a link via text or email when they are ready to initiate the visit?\"      Patient Confirmed they will join visit via: [employer]  Reminded patient to ensure they were logged on to virtual visit by arrival time listed.   Asked if patient has flexibility to initiate visit sooner than arrival time: patient is unable to initiate visit earlier than arrival time     If pediatric virtual visit, ensured pediatric patient along with parent/guardian will be present for video visit.     Patient offered the website [email or web address] and/or phone number to [employer] Help line: [phone]   Carmen is a 78 year old who is being evaluated via a billable telephone visit.    What phone number would you like to be contacted at? [phone]   How would you like to obtain your AVS? [employer]  Originating Location (pt. Location): Home    Distant Location (provider location):  Off-site    Assessment & Plan     Acute cystitis without hematuria   complaining of increased urinary frequency  She also has got pain on urination  She had some loose bowel movements but not exactly diarrhea a week ago  She thinks because of this she had some contamination and then UTI came on  Denies any hematuria  She does have history of recurrent UTIs in the past  Last UTI was in 2023  She denies any vaginal discharge  She did try OTC remedies like Azo without much benefit  I did discuss with her about testing and treating versus empirically treating  Since her symptoms are typical of UTI she elected to get " empirically treated  We will do a course of nitrofurantoin  - nitroFURantoin macrocrystal-monohydrate (MACROBID) 100 MG capsule; Take 1 capsule (100 mg) by mouth 2 times daily.                Nehemiah Morales is a 78 year old, presenting for the following health issues:  Urinary Pain      9/9/2024     4:03 PM   Additional Questions   Roomed by Marah TRUJILLO   Accompanied by self     History of Present Illness       Reason for visit:  Urinary Pain   She is taking medications regularly.       Genitourinary - Female  Onset/Duration: 3 days   Description:  Medication over the counter AZO   Painful urination (Dysuria): YES           Frequency: YES  Blood in urine (Hematuria): YES  Delay in urine (Hesitency): YES  Intensity: mild  Progression of Symptoms:  improving and intermittent  Accompanying Signs & Symptoms:  Fever/chills: No  Flank pain: YES  Nausea and vomiting: No  Vaginal symptoms: none  Abdominal/Pelvic Pain: YES  History:   History of frequent UTI s: YES  History of kidney stones: No  Sexually Active: No  Possibility of pregnancy: No  Precipitating or alleviating factors: AZO Dose Morning and Night   Therapies tried and outcome: Pyridium         Review of Systems  Constitutional, HEENT, cardiovascular, pulmonary, gi and gu systems are negative, except as otherwise noted.      Objective           Vitals:  No vitals were obtained today due to virtual visit.    Physical Exam   General: Alert and no distress //Respiratory: No audible wheeze, cough, or shortness of breath // Psychiatric:  Appropriate affect, tone, and pace of words            Phone call duration: 8 minutes  Signed Electronically by: Ronald Farfan MD

## 2024-11-11 ENCOUNTER — OFFICE VISIT (OUTPATIENT)
Dept: FAMILY MEDICINE | Facility: CLINIC | Age: 79
End: 2024-11-11
Payer: MEDICARE

## 2024-11-11 VITALS
WEIGHT: 185.8 LBS | SYSTOLIC BLOOD PRESSURE: 139 MMHG | TEMPERATURE: 97.8 F | RESPIRATION RATE: 18 BRPM | DIASTOLIC BLOOD PRESSURE: 70 MMHG | HEIGHT: 62 IN | OXYGEN SATURATION: 97 % | HEART RATE: 57 BPM | BODY MASS INDEX: 34.19 KG/M2

## 2024-11-11 DIAGNOSIS — E88.82 CLASS 1 OBESITY DUE TO DISRUPTION OF MC4R PATHWAY WITH SERIOUS COMORBIDITY AND BODY MASS INDEX (BMI) OF 34.0 TO 34.9 IN ADULT: ICD-10-CM

## 2024-11-11 DIAGNOSIS — E66.811 CLASS 1 OBESITY DUE TO DISRUPTION OF MC4R PATHWAY WITH SERIOUS COMORBIDITY AND BODY MASS INDEX (BMI) OF 34.0 TO 34.9 IN ADULT: ICD-10-CM

## 2024-11-11 DIAGNOSIS — Z23 NEED FOR PROPHYLACTIC VACCINATION AND INOCULATION AGAINST INFLUENZA: ICD-10-CM

## 2024-11-11 DIAGNOSIS — I1A.0 RESISTANT HYPERTENSION: ICD-10-CM

## 2024-11-11 DIAGNOSIS — I10 ESSENTIAL HYPERTENSION: ICD-10-CM

## 2024-11-11 DIAGNOSIS — R73.03 PREDIABETES: ICD-10-CM

## 2024-11-11 DIAGNOSIS — Z78.0 ENCOUNTER FOR OSTEOPOROSIS SCREENING IN ASYMPTOMATIC POSTMENOPAUSAL PATIENT: ICD-10-CM

## 2024-11-11 DIAGNOSIS — J45.20 INTERMITTENT ASTHMA, UNCOMPLICATED: Primary | ICD-10-CM

## 2024-11-11 DIAGNOSIS — Z13.820 ENCOUNTER FOR OSTEOPOROSIS SCREENING IN ASYMPTOMATIC POSTMENOPAUSAL PATIENT: ICD-10-CM

## 2024-11-11 DIAGNOSIS — E78.5 HYPERLIPIDEMIA, UNSPECIFIED HYPERLIPIDEMIA TYPE: ICD-10-CM

## 2024-11-11 DIAGNOSIS — Z15.2 CLASS 1 OBESITY DUE TO DISRUPTION OF MC4R PATHWAY WITH SERIOUS COMORBIDITY AND BODY MASS INDEX (BMI) OF 34.0 TO 34.9 IN ADULT: ICD-10-CM

## 2024-11-11 PROBLEM — E66.01 MORBID OBESITY (H): Status: RESOLVED | Noted: 2024-01-29 | Resolved: 2024-11-11

## 2024-11-11 LAB
EST. AVERAGE GLUCOSE BLD GHB EST-MCNC: 131 MG/DL
HBA1C MFR BLD: 6.2 % (ref 0–5.6)

## 2024-11-11 PROCEDURE — 36415 COLL VENOUS BLD VENIPUNCTURE: CPT | Performed by: INTERNAL MEDICINE

## 2024-11-11 PROCEDURE — 90662 IIV NO PRSV INCREASED AG IM: CPT | Performed by: INTERNAL MEDICINE

## 2024-11-11 PROCEDURE — 83036 HEMOGLOBIN GLYCOSYLATED A1C: CPT | Performed by: INTERNAL MEDICINE

## 2024-11-11 PROCEDURE — G0008 ADMIN INFLUENZA VIRUS VAC: HCPCS | Performed by: INTERNAL MEDICINE

## 2024-11-11 PROCEDURE — 80048 BASIC METABOLIC PNL TOTAL CA: CPT | Performed by: INTERNAL MEDICINE

## 2024-11-11 PROCEDURE — 99214 OFFICE O/P EST MOD 30 MIN: CPT | Performed by: INTERNAL MEDICINE

## 2024-11-11 ASSESSMENT — PAIN SCALES - GENERAL: PAINLEVEL_OUTOF10: NO PAIN (0)

## 2024-11-11 NOTE — PATIENT INSTRUCTIONS
You are due for bone density.  Please call the following number to make appointment :  [phone]  It is located in [address]    Monitor your blood pressure once a week  at home.  Bring those readings on your next visit.  Notify us if your blood pressure readings consistently stays greater than 140/90.     Flu shot today    Labs

## 2024-11-11 NOTE — PROGRESS NOTES
Carmen was seen today for hypertension.    Diagnoses and all orders for this visit:    Intermittent asthma, uncomplicated    Essential hypertension  -     Basic metabolic panel  (Ca, Cl, CO2, Creat, Gluc, K, Na, BUN); Future  Patient's initial blood pressure was 171/83. Manually rechecked at 139/70. She reports this morning her home blood pressure was 134/59 and rechecked at 132/57. She will continue to monitor her blood pressure at home. Discussed about keeping blood pressure and cholesterol under good control to reduce the risk of heart attack and stroke.      Hyperlipidemia, unspecified hyperlipidemia type  On rosuvastatin.     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 She has improved her diet and increased activity. She states she is feeling hopeful with her weight loss journey.  On metformin. She notes she recently started to take the full 500 MG dose.    Resistant hypertension  On metoprolol, lisinopril, clonidine and amlodipine. She states she has been tolerating amlodipine 2.5 MG well.  Note of nephrologist was reviewed   Home Blood Pressure readings are under good control     Prediabetes  -     Hemoglobin A1c; Future    Encounter for osteoporosis screening in asymptomatic postmenopausal patient  -     DX Bone Density; Future  She has been taking adequate calcium and vitamin D. She saw an endocrinologist about a year ago who had no recommendations at that time.  As insurance will not cover prolia   She will repeat her DEXA scan.     Other orders  -     REVIEW OF HEALTH MAINTENANCE PROTOCOL ORDERS    Other  She is interested in receiving the influenza vaccine today.  She saw a nephrologist about a year ago. She has not gotten a follow-up call.       Subjective   Carmen is a 79 year old, presenting for the following health issues:  Hypertension    History of Present Illness  "    Asthma:  She presents for follow up of asthma.  She has no cough, no wheezing, and no shortness of breath. She is not using a relief medication.  She typically misses taking her controller medication 6 time(s) per week. Patient is aware of the following triggers: mold, pollens and smoke. The patient has not had a visit to the Emergency Room, Urgent Care or Hospital due to asthma since the last clinic visit.     Hypertension: She presents for follow up of hypertension.  She does not check blood pressure  regularly outside of the clinic. Outpatient blood pressures have not been over 140/90. She does not follow a low salt diet.     She eats 2-3 servings of fruits and vegetables daily.She consumes 0 sweetened beverage(s) daily.She exercises with enough effort to increase her heart rate 9 or less minutes per day.  She exercises with enough effort to increase her heart rate 3 or less days per week.   She is taking medications regularly.       Review of Systems  Constitutional, HEENT, cardiovascular, pulmonary, GI, , musculoskeletal, neuro, skin, endocrine and psych systems are negative, except as otherwise noted.      Objective    /70 (BP Location: Right arm, Patient Position: Sitting, Cuff Size: Adult Large)   Pulse 57   Temp 97.8  F (36.6  C) (Oral)   Resp 18   Ht 1.575 m (5' 2.01\")   Wt 84.3 kg (185 lb 12.8 oz)   SpO2 97%   BMI 33.97 kg/m    Body mass index is 33.97 kg/m .    Physical Exam     GENERAL: healthy, alert and no distress  PSYCH: mentation appears normal, affect normal/bright        Signed Electronically by: Maribell Castaneda MD  This document serves as a record of the services and decisions personally performed and made by Dr. Castaneda. It was created on her behalf by Giselle Medina, a trained medical scribe. The creation of this document is based the provider's statements to the medical scribe.    "

## 2024-11-12 ENCOUNTER — PATIENT OUTREACH (OUTPATIENT)
Dept: CARE COORDINATION | Facility: CLINIC | Age: 79
End: 2024-11-12
Payer: MEDICARE

## 2024-11-12 LAB
ANION GAP SERPL CALCULATED.3IONS-SCNC: 11 MMOL/L (ref 7–15)
BUN SERPL-MCNC: 18.1 MG/DL (ref 8–23)
CALCIUM SERPL-MCNC: 10.1 MG/DL (ref 8.8–10.4)
CHLORIDE SERPL-SCNC: 102 MMOL/L (ref 98–107)
CREAT SERPL-MCNC: 0.85 MG/DL (ref 0.51–0.95)
EGFRCR SERPLBLD CKD-EPI 2021: 68 ML/MIN/1.73M2
GLUCOSE SERPL-MCNC: 83 MG/DL (ref 70–99)
HCO3 SERPL-SCNC: 25 MMOL/L (ref 22–29)
POTASSIUM SERPL-SCNC: 4.7 MMOL/L (ref 3.4–5.3)
SODIUM SERPL-SCNC: 138 MMOL/L (ref 135–145)

## 2024-11-12 NOTE — RESULT ENCOUNTER NOTE
Mary Morales    This is to inform you regarding your test result.    HbA1c which is average glucose during last 3 months is 6.2%  You are prediabetic .  It has improved compared to last time   Avoid high sugar containing food.  Do regular physical activity.  Basic metabolic panel which includes electrolytes and kidney fucntion is normal  Lab Results       Component                Value               Date                       A1C                      6.2                 11/11/2024                 A1C                      6.4                 01/29/2024                 A1C                      6.1                 05/23/2023                 A1C                      5.4                 01/28/2013                 A1C                      5.8                 08/10/2012                 A1C                      5.7                 03/06/2012                 A1C                      5.7                 03/05/2012                  Sincerely,      Dr.Nasima Tonya MD,FACP

## 2024-11-14 ENCOUNTER — PATIENT OUTREACH (OUTPATIENT)
Dept: CARE COORDINATION | Facility: CLINIC | Age: 79
End: 2024-11-14
Payer: MEDICARE

## 2024-12-04 ENCOUNTER — HOSPITAL ENCOUNTER (OUTPATIENT)
Dept: BONE DENSITY | Facility: CLINIC | Age: 79
Discharge: HOME | End: 2024-12-04
Attending: INTERNAL MEDICINE
Payer: MEDICARE

## 2024-12-04 DIAGNOSIS — Z78.0 ENCOUNTER FOR OSTEOPOROSIS SCREENING IN ASYMPTOMATIC POSTMENOPAUSAL PATIENT: ICD-10-CM

## 2024-12-04 DIAGNOSIS — Z13.820 ENCOUNTER FOR OSTEOPOROSIS SCREENING IN ASYMPTOMATIC POSTMENOPAUSAL PATIENT: ICD-10-CM

## 2024-12-04 PROCEDURE — 77080 DXA BONE DENSITY AXIAL: CPT

## 2024-12-06 NOTE — RESULT ENCOUNTER NOTE
Mary Morales    This is to inform you regarding your test result.    Your bone density shows osteopenia but it  has imrpoved     -There has been a 3.8% increase in lumbar spine BMD.  -There has been a 4.7% increase in bilateral hip BMD.    The bone density test shows osteopenia. This is an intermediate category that is in between normal and osteoporosis.  People with osteopenia should work on taking adequate amount of calcium with vitamin D daily. They should also be getting daily weight bearing exercise (walking works)  Repeat bone density in 3 years.      Sincerely,      Dr.Nasima Tonya MD,FACP

## 2025-01-29 DIAGNOSIS — I1A.0 RESISTANT HYPERTENSION: ICD-10-CM

## 2025-01-29 RX ORDER — METOPROLOL SUCCINATE 25 MG/1
25 TABLET, EXTENDED RELEASE ORAL DAILY
Qty: 90 TABLET | Refills: 2 | Status: SHIPPED | OUTPATIENT
Start: 2025-01-29

## 2025-02-18 DIAGNOSIS — I10 ESSENTIAL HYPERTENSION: ICD-10-CM

## 2025-02-18 RX ORDER — LISINOPRIL 40 MG/1
40 TABLET ORAL DAILY
Qty: 90 TABLET | Refills: 2 | Status: SHIPPED | OUTPATIENT
Start: 2025-02-18

## 2025-05-12 ENCOUNTER — MYC MEDICAL ADVICE (OUTPATIENT)
Dept: FAMILY MEDICINE | Facility: CLINIC | Age: 80
End: 2025-05-12
Payer: MEDICARE

## 2025-05-12 DIAGNOSIS — J45.20 INTERMITTENT ASTHMA, UNCOMPLICATED: ICD-10-CM

## 2025-05-12 RX ORDER — ALBUTEROL SULFATE 90 UG/1
2 INHALANT RESPIRATORY (INHALATION) EVERY 4 HOURS PRN
Qty: 18 G | Refills: 1 | Status: SHIPPED | OUTPATIENT
Start: 2025-05-12

## 2025-05-28 ENCOUNTER — TRANSFERRED RECORDS (OUTPATIENT)
Dept: HEALTH INFORMATION MANAGEMENT | Facility: CLINIC | Age: 80
End: 2025-05-28
Payer: MEDICARE

## 2025-07-28 ENCOUNTER — PATIENT OUTREACH (OUTPATIENT)
Dept: CARE COORDINATION | Facility: CLINIC | Age: 80
End: 2025-07-28
Payer: MEDICARE

## 2025-08-28 ENCOUNTER — OFFICE VISIT (OUTPATIENT)
Dept: FAMILY MEDICINE | Facility: CLINIC | Age: 80
End: 2025-08-28
Payer: MEDICARE

## 2025-08-28 VITALS
DIASTOLIC BLOOD PRESSURE: 53 MMHG | TEMPERATURE: 98.2 F | HEART RATE: 58 BPM | OXYGEN SATURATION: 96 % | BODY MASS INDEX: 34.46 KG/M2 | HEIGHT: 61 IN | SYSTOLIC BLOOD PRESSURE: 137 MMHG | WEIGHT: 182.5 LBS | RESPIRATION RATE: 12 BRPM

## 2025-08-28 DIAGNOSIS — Z00.00 ENCOUNTER FOR MEDICARE ANNUAL WELLNESS EXAM: Primary | ICD-10-CM

## 2025-08-28 DIAGNOSIS — Z23 NEED FOR VACCINATION: ICD-10-CM

## 2025-08-28 DIAGNOSIS — F51.01 PRIMARY INSOMNIA: ICD-10-CM

## 2025-08-28 DIAGNOSIS — M25.562 CHRONIC PAIN OF BOTH KNEES: ICD-10-CM

## 2025-08-28 DIAGNOSIS — J45.20 INTERMITTENT ASTHMA, UNCOMPLICATED: ICD-10-CM

## 2025-08-28 DIAGNOSIS — Z13.29 SCREENING FOR THYROID DISORDER: ICD-10-CM

## 2025-08-28 DIAGNOSIS — E78.5 HYPERLIPIDEMIA, UNSPECIFIED HYPERLIPIDEMIA TYPE: ICD-10-CM

## 2025-08-28 DIAGNOSIS — L60.0 IGTN (INGROWING TOE NAIL): ICD-10-CM

## 2025-08-28 DIAGNOSIS — G89.29 CHRONIC PAIN OF BOTH KNEES: ICD-10-CM

## 2025-08-28 DIAGNOSIS — R73.03 PREDIABETES: ICD-10-CM

## 2025-08-28 DIAGNOSIS — Z92.29 HISTORY OF BISPHOSPHONATE THERAPY: ICD-10-CM

## 2025-08-28 DIAGNOSIS — M25.561 CHRONIC PAIN OF BOTH KNEES: ICD-10-CM

## 2025-08-28 DIAGNOSIS — Z13.0 SCREENING FOR DEFICIENCY ANEMIA: ICD-10-CM

## 2025-08-28 DIAGNOSIS — I1A.0 RESISTANT HYPERTENSION: ICD-10-CM

## 2025-08-28 DIAGNOSIS — Z79.899 MEDICATION MANAGEMENT: ICD-10-CM

## 2025-08-28 DIAGNOSIS — M85.80 OSTEOPENIA WITH HIGH RISK OF FRACTURE: ICD-10-CM

## 2025-08-28 LAB
ALBUMIN SERPL BCG-MCNC: 4.5 G/DL (ref 3.5–5.2)
ALP SERPL-CCNC: 69 U/L (ref 40–150)
ALT SERPL W P-5'-P-CCNC: 15 U/L (ref 0–50)
ANION GAP SERPL CALCULATED.3IONS-SCNC: 10 MMOL/L (ref 7–15)
AST SERPL W P-5'-P-CCNC: 23 U/L (ref 0–45)
BILIRUB SERPL-MCNC: 0.4 MG/DL
BUN SERPL-MCNC: 18.2 MG/DL (ref 8–23)
CALCIUM SERPL-MCNC: 10.1 MG/DL (ref 8.8–10.4)
CHLORIDE SERPL-SCNC: 102 MMOL/L (ref 98–107)
CHOLEST SERPL-MCNC: 143 MG/DL
CREAT SERPL-MCNC: 0.83 MG/DL (ref 0.51–0.95)
EGFRCR SERPLBLD CKD-EPI 2021: 70 ML/MIN/1.73M2
ERYTHROCYTE [DISTWIDTH] IN BLOOD BY AUTOMATED COUNT: 12.4 % (ref 10–15)
EST. AVERAGE GLUCOSE BLD GHB EST-MCNC: 131 MG/DL
FASTING STATUS PATIENT QL REPORTED: NO
FASTING STATUS PATIENT QL REPORTED: NO
GLUCOSE SERPL-MCNC: 84 MG/DL (ref 70–99)
HBA1C MFR BLD: 6.2 % (ref 0–5.6)
HCO3 SERPL-SCNC: 27 MMOL/L (ref 22–29)
HCT VFR BLD AUTO: 40.8 % (ref 35–47)
HDLC SERPL-MCNC: 47 MG/DL
HGB BLD-MCNC: 13.1 G/DL (ref 11.7–15.7)
LDLC SERPL CALC-MCNC: 62 MG/DL
MCH RBC QN AUTO: 27.3 PG (ref 26.5–33)
MCHC RBC AUTO-ENTMCNC: 32.1 G/DL (ref 31.5–36.5)
MCV RBC AUTO: 85 FL (ref 78–100)
NONHDLC SERPL-MCNC: 96 MG/DL
PLATELET # BLD AUTO: 182 10E3/UL (ref 150–450)
POTASSIUM SERPL-SCNC: 4.3 MMOL/L (ref 3.4–5.3)
PROT SERPL-MCNC: 7.9 G/DL (ref 6.4–8.3)
RBC # BLD AUTO: 4.8 10E6/UL (ref 3.8–5.2)
SODIUM SERPL-SCNC: 139 MMOL/L (ref 135–145)
TRIGL SERPL-MCNC: 169 MG/DL
TSH SERPL DL<=0.005 MIU/L-ACNC: 1.6 UIU/ML (ref 0.3–4.2)
WBC # BLD AUTO: 6.1 10E3/UL (ref 4–11)

## 2025-08-28 RX ORDER — FLUTICASONE PROPIONATE AND SALMETEROL 250; 50 UG/1; UG/1
1 POWDER RESPIRATORY (INHALATION) EVERY 12 HOURS
Qty: 60 EACH | Refills: 3 | Status: SHIPPED | OUTPATIENT
Start: 2025-08-28

## 2025-08-28 RX ORDER — METOPROLOL SUCCINATE 25 MG/1
25 TABLET, EXTENDED RELEASE ORAL DAILY
Qty: 90 TABLET | Refills: 2 | Status: SHIPPED | OUTPATIENT
Start: 2025-08-28

## 2025-08-28 RX ORDER — METFORMIN HYDROCHLORIDE 500 MG/1
1000 TABLET, EXTENDED RELEASE ORAL
Qty: 180 TABLET | Refills: 1 | Status: SHIPPED | OUTPATIENT
Start: 2025-08-28

## 2025-08-28 SDOH — HEALTH STABILITY: PHYSICAL HEALTH: ON AVERAGE, HOW MANY DAYS PER WEEK DO YOU ENGAGE IN MODERATE TO STRENUOUS EXERCISE (LIKE A BRISK WALK)?: 0 DAYS

## 2025-08-28 ASSESSMENT — ASTHMA QUESTIONNAIRES
QUESTION_4 LAST FOUR WEEKS HOW OFTEN HAVE YOU USED YOUR RESCUE INHALER OR NEBULIZER MEDICATION (SUCH AS ALBUTEROL): ONCE A WEEK OR LESS
QUESTION_5 LAST FOUR WEEKS HOW WOULD YOU RATE YOUR ASTHMA CONTROL: SOMEWHAT CONTROLLED
QUESTION_2 LAST FOUR WEEKS HOW OFTEN HAVE YOU HAD SHORTNESS OF BREATH: ONCE A DAY
QUESTION_3 LAST FOUR WEEKS HOW OFTEN DID YOUR ASTHMA SYMPTOMS (WHEEZING, COUGHING, SHORTNESS OF BREATH, CHEST TIGHTNESS OR PAIN) WAKE YOU UP AT NIGHT OR EARLIER THAN USUAL IN THE MORNING: NOT AT ALL
QUESTION_1 LAST FOUR WEEKS HOW MUCH OF THE TIME DID YOUR ASTHMA KEEP YOU FROM GETTING AS MUCH DONE AT WORK, SCHOOL OR AT HOME: NONE OF THE TIME
ACT_TOTALSCORE: 19

## 2025-09-01 ENCOUNTER — PATIENT OUTREACH (OUTPATIENT)
Dept: CARE COORDINATION | Facility: CLINIC | Age: 80
End: 2025-09-01
Payer: MEDICARE